# Patient Record
Sex: MALE | Race: WHITE
[De-identification: names, ages, dates, MRNs, and addresses within clinical notes are randomized per-mention and may not be internally consistent; named-entity substitution may affect disease eponyms.]

---

## 2017-01-12 ENCOUNTER — HOSPITAL ENCOUNTER (EMERGENCY)
Dept: HOSPITAL 71 - ER | Age: 77
LOS: 1 days | Discharge: HOME | End: 2017-01-13
Payer: MEDICARE

## 2017-01-12 DIAGNOSIS — Z79.82: ICD-10-CM

## 2017-01-12 DIAGNOSIS — R00.1: ICD-10-CM

## 2017-01-12 DIAGNOSIS — Z79.899: ICD-10-CM

## 2017-01-12 DIAGNOSIS — E11.65: ICD-10-CM

## 2017-01-12 DIAGNOSIS — R42: Primary | ICD-10-CM

## 2017-01-12 PROCEDURE — 96372 THER/PROPH/DIAG INJ SC/IM: CPT

## 2017-01-12 PROCEDURE — 84439 ASSAY OF FREE THYROXINE: CPT

## 2017-01-12 PROCEDURE — 80053 COMPREHEN METABOLIC PANEL: CPT

## 2017-01-12 PROCEDURE — 83735 ASSAY OF MAGNESIUM: CPT

## 2017-01-12 PROCEDURE — 36415 COLL VENOUS BLD VENIPUNCTURE: CPT

## 2017-01-12 PROCEDURE — 84484 ASSAY OF TROPONIN QUANT: CPT

## 2017-01-12 PROCEDURE — 84443 ASSAY THYROID STIM HORMONE: CPT

## 2017-01-12 PROCEDURE — 81001 URINALYSIS AUTO W/SCOPE: CPT

## 2017-01-12 PROCEDURE — 85014 HEMATOCRIT: CPT

## 2017-01-12 PROCEDURE — 80047 BASIC METABLC PNL IONIZED CA: CPT

## 2017-01-12 PROCEDURE — 85025 COMPLETE CBC W/AUTO DIFF WBC: CPT

## 2017-01-12 PROCEDURE — 82947 ASSAY GLUCOSE BLOOD QUANT: CPT

## 2017-01-12 PROCEDURE — 71020: CPT

## 2017-01-12 PROCEDURE — 93005 ELECTROCARDIOGRAM TRACING: CPT

## 2018-01-03 ENCOUNTER — OFFICE VISIT CONVERTED (OUTPATIENT)
Dept: ONCOLOGY | Facility: HOSPITAL | Age: 78
End: 2018-01-03
Attending: INTERNAL MEDICINE

## 2018-02-01 ENCOUNTER — OFFICE VISIT CONVERTED (OUTPATIENT)
Dept: ONCOLOGY | Facility: HOSPITAL | Age: 78
End: 2018-02-01
Attending: INTERNAL MEDICINE

## 2018-02-20 ENCOUNTER — OFFICE VISIT CONVERTED (OUTPATIENT)
Dept: FAMILY MEDICINE CLINIC | Facility: CLINIC | Age: 78
End: 2018-02-20
Attending: NURSE PRACTITIONER

## 2018-04-03 ENCOUNTER — OFFICE VISIT CONVERTED (OUTPATIENT)
Dept: FAMILY MEDICINE CLINIC | Facility: CLINIC | Age: 78
End: 2018-04-03
Attending: NURSE PRACTITIONER

## 2018-04-17 ENCOUNTER — CONVERSION ENCOUNTER (OUTPATIENT)
Dept: FAMILY MEDICINE CLINIC | Facility: CLINIC | Age: 78
End: 2018-04-17

## 2018-04-17 ENCOUNTER — OFFICE VISIT CONVERTED (OUTPATIENT)
Dept: FAMILY MEDICINE CLINIC | Facility: CLINIC | Age: 78
End: 2018-04-17
Attending: NURSE PRACTITIONER

## 2018-05-09 ENCOUNTER — OFFICE VISIT CONVERTED (OUTPATIENT)
Dept: CARDIOLOGY | Facility: CLINIC | Age: 78
End: 2018-05-09
Attending: INTERNAL MEDICINE

## 2018-05-21 ENCOUNTER — OFFICE VISIT CONVERTED (OUTPATIENT)
Dept: FAMILY MEDICINE CLINIC | Facility: CLINIC | Age: 78
End: 2018-05-21
Attending: NURSE PRACTITIONER

## 2018-08-08 ENCOUNTER — HOSPITAL ENCOUNTER (INPATIENT)
Facility: HOSPITAL | Age: 78
LOS: 6 days | Discharge: HOME | End: 2018-08-14
Attending: THORACIC SURGERY (CARDIOTHORACIC VASCULAR SURGERY) | Admitting: INTERNAL MEDICINE

## 2018-08-08 DIAGNOSIS — I25.110 CORONARY ARTERY DISEASE INVOLVING NATIVE CORONARY ARTERY OF NATIVE HEART WITH UNSTABLE ANGINA PECTORIS (HCC): Primary | ICD-10-CM

## 2018-08-08 LAB
BH CV XLRA MEAS - DIST GSV CALF DIST LEFT: 0.24 CM
BH CV XLRA MEAS - DIST GSV CALF DIST RIGHT: 0.2 CM
BH CV XLRA MEAS - DIST GSV THIGH DIST LEFT: 0.28 CM
BH CV XLRA MEAS - DIST GSV THIGH DIST RIGHT: 0.24 CM
BH CV XLRA MEAS - GSV ANKLE DIST LEFT: 0.25 CM
BH CV XLRA MEAS - GSV ANKLE DIST RIGHT: 0.29 CM
BH CV XLRA MEAS - GSV KNEE DIST LEFT: 0.2 CM
BH CV XLRA MEAS - GSV KNEE DIST RIGHT: 0.22 CM
BH CV XLRA MEAS - GSV ORIGIN DIST LEFT: 0.47 CM
BH CV XLRA MEAS - GSV ORIGIN DIST RIGHT: 0.35 CM
BH CV XLRA MEAS - MID GSV CALF LEFT: 0.24 CM
BH CV XLRA MEAS - MID GSV CALF RIGHT: 0.24 CM
BH CV XLRA MEAS - MID GSV THIGH  LEFT: 0.29 CM
BH CV XLRA MEAS - MID GSV THIGH  RIGHT: 0.26 CM
BH CV XLRA MEAS - PROX GSV CALF DIST LEFT: 0.14 CM
BH CV XLRA MEAS - PROX GSV CALF DIST RIGHT: 0.25 CM
BH CV XLRA MEAS - PROX GSV THIGH  LEFT: 0.32 CM
BH CV XLRA MEAS - PROX GSV THIGH  RIGHT: 0.28 CM
GLUCOSE BLDC GLUCOMTR-MCNC: 255 MG/DL (ref 70–130)
GLUCOSE BLDC GLUCOMTR-MCNC: 348 MG/DL (ref 70–130)
MAGNESIUM SERPL-MCNC: 2 MG/DL (ref 1.6–2.4)
POTASSIUM BLD-SCNC: 4.7 MMOL/L (ref 3.5–5.2)
TROPONIN T SERPL-MCNC: 0.24 NG/ML (ref 0–0.03)

## 2018-08-08 PROCEDURE — 63710000001 INSULIN ASPART PER 5 UNITS: Performed by: THORACIC SURGERY (CARDIOTHORACIC VASCULAR SURGERY)

## 2018-08-08 PROCEDURE — 83735 ASSAY OF MAGNESIUM: CPT | Performed by: THORACIC SURGERY (CARDIOTHORACIC VASCULAR SURGERY)

## 2018-08-08 PROCEDURE — 84132 ASSAY OF SERUM POTASSIUM: CPT | Performed by: THORACIC SURGERY (CARDIOTHORACIC VASCULAR SURGERY)

## 2018-08-08 PROCEDURE — 82962 GLUCOSE BLOOD TEST: CPT

## 2018-08-08 PROCEDURE — 93010 ELECTROCARDIOGRAM REPORT: CPT | Performed by: INTERNAL MEDICINE

## 2018-08-08 PROCEDURE — 84484 ASSAY OF TROPONIN QUANT: CPT | Performed by: THORACIC SURGERY (CARDIOTHORACIC VASCULAR SURGERY)

## 2018-08-08 PROCEDURE — 63710000001 INSULIN DETEMIR PER 5 UNITS: Performed by: THORACIC SURGERY (CARDIOTHORACIC VASCULAR SURGERY)

## 2018-08-08 PROCEDURE — 93005 ELECTROCARDIOGRAM TRACING: CPT | Performed by: THORACIC SURGERY (CARDIOTHORACIC VASCULAR SURGERY)

## 2018-08-08 RX ORDER — ATORVASTATIN CALCIUM 20 MG/1
20 TABLET, FILM COATED ORAL NIGHTLY
COMMUNITY
End: 2018-08-14 | Stop reason: HOSPADM

## 2018-08-08 RX ORDER — METOPROLOL SUCCINATE 25 MG/1
12.5 TABLET, EXTENDED RELEASE ORAL NIGHTLY
COMMUNITY
End: 2018-08-14 | Stop reason: HOSPADM

## 2018-08-08 RX ORDER — UREA 10 %
2500 LOTION (ML) TOPICAL DAILY
Status: DISCONTINUED | OUTPATIENT
Start: 2018-08-09 | End: 2018-08-09 | Stop reason: ALTCHOICE

## 2018-08-08 RX ORDER — MAGNESIUM SULFATE HEPTAHYDRATE 40 MG/ML
2 INJECTION, SOLUTION INTRAVENOUS AS NEEDED
Status: DISCONTINUED | OUTPATIENT
Start: 2018-08-08 | End: 2018-08-14 | Stop reason: HOSPADM

## 2018-08-08 RX ORDER — ATORVASTATIN CALCIUM 20 MG/1
20 TABLET, FILM COATED ORAL NIGHTLY
Status: DISCONTINUED | OUTPATIENT
Start: 2018-08-08 | End: 2018-08-10

## 2018-08-08 RX ORDER — INSULIN GLARGINE 100 [IU]/ML
40 INJECTION, SOLUTION SUBCUTANEOUS NIGHTLY
COMMUNITY

## 2018-08-08 RX ORDER — METOPROLOL SUCCINATE 25 MG/1
12.5 TABLET, EXTENDED RELEASE ORAL NIGHTLY
Status: DISCONTINUED | OUTPATIENT
Start: 2018-08-08 | End: 2018-08-14 | Stop reason: HOSPADM

## 2018-08-08 RX ORDER — NICOTINE POLACRILEX 4 MG
15 LOZENGE BUCCAL
Status: DISCONTINUED | OUTPATIENT
Start: 2018-08-08 | End: 2018-08-14 | Stop reason: HOSPADM

## 2018-08-08 RX ORDER — DIPHENHYDRAMINE HCL 25 MG
25 CAPSULE ORAL NIGHTLY PRN
Status: DISCONTINUED | OUTPATIENT
Start: 2018-08-08 | End: 2018-08-14 | Stop reason: HOSPADM

## 2018-08-08 RX ORDER — SPIRONOLACTONE 25 MG/1
25 TABLET ORAL EVERY OTHER DAY
Status: DISCONTINUED | OUTPATIENT
Start: 2018-08-08 | End: 2018-08-09

## 2018-08-08 RX ORDER — NITROGLYCERIN 0.4 MG/1
0.4 TABLET SUBLINGUAL
Status: DISCONTINUED | OUTPATIENT
Start: 2018-08-08 | End: 2018-08-09 | Stop reason: SDUPTHER

## 2018-08-08 RX ORDER — ASPIRIN 81 MG/1
81 TABLET ORAL DAILY
Status: DISCONTINUED | OUTPATIENT
Start: 2018-08-09 | End: 2018-08-14 | Stop reason: HOSPADM

## 2018-08-08 RX ORDER — AMLODIPINE BESYLATE AND BENAZEPRIL HYDROCHLORIDE 10; 40 MG/1; MG/1
1 CAPSULE ORAL DAILY
COMMUNITY
End: 2018-08-14 | Stop reason: HOSPADM

## 2018-08-08 RX ORDER — SODIUM CHLORIDE 0.9 % (FLUSH) 0.9 %
10 SYRINGE (ML) INJECTION AS NEEDED
Status: DISCONTINUED | OUTPATIENT
Start: 2018-08-08 | End: 2018-08-14 | Stop reason: HOSPADM

## 2018-08-08 RX ORDER — ACETAMINOPHEN 325 MG/1
650 TABLET ORAL EVERY 4 HOURS PRN
Status: DISCONTINUED | OUTPATIENT
Start: 2018-08-08 | End: 2018-08-14 | Stop reason: HOSPADM

## 2018-08-08 RX ORDER — ALPRAZOLAM 0.25 MG/1
0.25 TABLET ORAL EVERY 8 HOURS PRN
Status: DISCONTINUED | OUTPATIENT
Start: 2018-08-08 | End: 2018-08-14 | Stop reason: HOSPADM

## 2018-08-08 RX ORDER — GLIPIZIDE 10 MG/1
10 TABLET ORAL 2 TIMES DAILY
COMMUNITY

## 2018-08-08 RX ORDER — SPIRONOLACTONE 25 MG/1
25 TABLET ORAL EVERY OTHER DAY
COMMUNITY
End: 2018-08-14 | Stop reason: HOSPADM

## 2018-08-08 RX ORDER — MELATONIN
1000 DAILY
Status: DISCONTINUED | OUTPATIENT
Start: 2018-08-09 | End: 2018-08-14 | Stop reason: HOSPADM

## 2018-08-08 RX ORDER — SODIUM CHLORIDE 0.9 % (FLUSH) 0.9 %
10 SYRINGE (ML) INJECTION EVERY 12 HOURS SCHEDULED
Status: DISCONTINUED | OUTPATIENT
Start: 2018-08-09 | End: 2018-08-14 | Stop reason: HOSPADM

## 2018-08-08 RX ORDER — DEXTROSE MONOHYDRATE 25 G/50ML
25 INJECTION, SOLUTION INTRAVENOUS
Status: DISCONTINUED | OUTPATIENT
Start: 2018-08-08 | End: 2018-08-14 | Stop reason: HOSPADM

## 2018-08-08 RX ORDER — MELATONIN
1000 DAILY
COMMUNITY

## 2018-08-08 RX ORDER — POTASSIUM CHLORIDE 1.5 G/1.77G
40 POWDER, FOR SOLUTION ORAL AS NEEDED
Status: DISCONTINUED | OUTPATIENT
Start: 2018-08-08 | End: 2018-08-14 | Stop reason: HOSPADM

## 2018-08-08 RX ORDER — POTASSIUM CHLORIDE 7.45 MG/ML
10 INJECTION INTRAVENOUS
Status: DISCONTINUED | OUTPATIENT
Start: 2018-08-08 | End: 2018-08-14 | Stop reason: HOSPADM

## 2018-08-08 RX ORDER — PHENOL 1.4 %
600 AEROSOL, SPRAY (ML) MUCOUS MEMBRANE 2 TIMES DAILY WITH MEALS
COMMUNITY

## 2018-08-08 RX ORDER — SODIUM CHLORIDE 0.9 % (FLUSH) 0.9 %
20 SYRINGE (ML) INJECTION AS NEEDED
Status: DISCONTINUED | OUTPATIENT
Start: 2018-08-08 | End: 2018-08-14 | Stop reason: HOSPADM

## 2018-08-08 RX ORDER — ASPIRIN 81 MG/1
81 TABLET ORAL DAILY
COMMUNITY

## 2018-08-08 RX ORDER — TEMAZEPAM 7.5 MG/1
7.5 CAPSULE ORAL NIGHTLY PRN
Status: DISCONTINUED | OUTPATIENT
Start: 2018-08-08 | End: 2018-08-14 | Stop reason: HOSPADM

## 2018-08-08 RX ORDER — GLIPIZIDE 10 MG/1
10 TABLET ORAL 2 TIMES DAILY
Status: DISCONTINUED | OUTPATIENT
Start: 2018-08-08 | End: 2018-08-09

## 2018-08-08 RX ORDER — POTASSIUM CHLORIDE 750 MG/1
40 CAPSULE, EXTENDED RELEASE ORAL AS NEEDED
Status: DISCONTINUED | OUTPATIENT
Start: 2018-08-08 | End: 2018-08-14 | Stop reason: HOSPADM

## 2018-08-08 RX ORDER — MAGNESIUM SULFATE 1 G/100ML
1 INJECTION INTRAVENOUS AS NEEDED
Status: DISCONTINUED | OUTPATIENT
Start: 2018-08-08 | End: 2018-08-14 | Stop reason: HOSPADM

## 2018-08-08 RX ADMIN — NITROGLYCERIN 0.4 MG: 0.4 TABLET, ORALLY DISINTEGRATING SUBLINGUAL at 23:04

## 2018-08-08 RX ADMIN — METOPROLOL SUCCINATE 12.5 MG: 25 TABLET, FILM COATED, EXTENDED RELEASE ORAL at 23:49

## 2018-08-08 RX ADMIN — GLIPIZIDE 10 MG: 10 TABLET ORAL at 23:49

## 2018-08-08 RX ADMIN — ATORVASTATIN CALCIUM 20 MG: 20 TABLET, FILM COATED ORAL at 23:49

## 2018-08-08 RX ADMIN — ALPRAZOLAM 0.25 MG: 0.25 TABLET ORAL at 23:07

## 2018-08-08 RX ADMIN — INSULIN ASPART 7 UNITS: 100 INJECTION, SOLUTION INTRAVENOUS; SUBCUTANEOUS at 23:57

## 2018-08-08 RX ADMIN — NITROGLYCERIN 0.4 MG: 0.4 TABLET, ORALLY DISINTEGRATING SUBLINGUAL at 22:59

## 2018-08-08 RX ADMIN — INSULIN DETEMIR 30 UNITS: 100 INJECTION, SOLUTION SUBCUTANEOUS at 23:57

## 2018-08-09 ENCOUNTER — APPOINTMENT (OUTPATIENT)
Dept: CARDIOLOGY | Facility: HOSPITAL | Age: 78
End: 2018-08-09
Attending: THORACIC SURGERY (CARDIOTHORACIC VASCULAR SURGERY)

## 2018-08-09 ENCOUNTER — APPOINTMENT (OUTPATIENT)
Dept: GENERAL RADIOLOGY | Facility: HOSPITAL | Age: 78
End: 2018-08-09
Attending: THORACIC SURGERY (CARDIOTHORACIC VASCULAR SURGERY)

## 2018-08-09 PROBLEM — I25.110 CORONARY ARTERY DISEASE INVOLVING NATIVE CORONARY ARTERY OF NATIVE HEART WITH UNSTABLE ANGINA PECTORIS (HCC): Status: ACTIVE | Noted: 2018-08-09

## 2018-08-09 LAB
ABO GROUP BLD: NORMAL
ANION GAP SERPL CALCULATED.3IONS-SCNC: 9.9 MMOL/L
ARTERIAL PATENCY WRIST A: POSITIVE
ATMOSPHERIC PRESS: 748.5 MMHG
BACTERIA UR QL AUTO: NORMAL /HPF
BASE EXCESS BLDA CALC-SCNC: 0 MMOL/L (ref 0–2)
BDY SITE: ABNORMAL
BH CV XLRA MEAS LEFT DIST CCA EDV: -24.4 CM/SEC
BH CV XLRA MEAS LEFT DIST CCA PSV: -88.8 CM/SEC
BH CV XLRA MEAS LEFT DIST ICA EDV: -38.5 CM/SEC
BH CV XLRA MEAS LEFT DIST ICA PSV: -144 CM/SEC
BH CV XLRA MEAS LEFT ICA/CCA RATIO: 2
BH CV XLRA MEAS LEFT MID ICA EDV: 53 CM/SEC
BH CV XLRA MEAS LEFT MID ICA PSV: 164 CM/SEC
BH CV XLRA MEAS LEFT PROX CCA EDV: 16.5 CM/SEC
BH CV XLRA MEAS LEFT PROX CCA PSV: 76.2 CM/SEC
BH CV XLRA MEAS LEFT PROX ECA EDV: -9.4 CM/SEC
BH CV XLRA MEAS LEFT PROX ECA PSV: -84.4 CM/SEC
BH CV XLRA MEAS LEFT PROX ICA EDV: -54 CM/SEC
BH CV XLRA MEAS LEFT PROX ICA PSV: -185 CM/SEC
BH CV XLRA MEAS LEFT PROX SCLA PSV: 116 CM/SEC
BH CV XLRA MEAS LEFT VERTEBRAL A EDV: -13.7 CM/SEC
BH CV XLRA MEAS LEFT VERTEBRAL A PSV: -47.5 CM/SEC
BH CV XLRA MEAS RIGHT DIST CCA EDV: 17.6 CM/SEC
BH CV XLRA MEAS RIGHT DIST CCA PSV: 85 CM/SEC
BH CV XLRA MEAS RIGHT DIST ICA EDV: -41.6 CM/SEC
BH CV XLRA MEAS RIGHT DIST ICA PSV: -122 CM/SEC
BH CV XLRA MEAS RIGHT ICA/CCA RATIO: 1.7
BH CV XLRA MEAS RIGHT MID ICA EDV: -28.7 CM/SEC
BH CV XLRA MEAS RIGHT MID ICA PSV: -95 CM/SEC
BH CV XLRA MEAS RIGHT PROX CCA EDV: 11.4 CM/SEC
BH CV XLRA MEAS RIGHT PROX CCA PSV: 62.9 CM/SEC
BH CV XLRA MEAS RIGHT PROX ECA EDV: -9.4 CM/SEC
BH CV XLRA MEAS RIGHT PROX ECA PSV: -93.2 CM/SEC
BH CV XLRA MEAS RIGHT PROX ICA EDV: -45.1 CM/SEC
BH CV XLRA MEAS RIGHT PROX ICA PSV: -148 CM/SEC
BH CV XLRA MEAS RIGHT PROX SCLA PSV: 124 CM/SEC
BH CV XLRA MEAS RIGHT VERTEBRAL A EDV: 15.8 CM/SEC
BH CV XLRA MEAS RIGHT VERTEBRAL A PSV: -46.3 CM/SEC
BILIRUB UR QL STRIP: NEGATIVE
BLD GP AB SCN SERPL QL: NEGATIVE
BUN BLD-MCNC: 22 MG/DL (ref 8–23)
BUN/CREAT SERPL: 18.3 (ref 7–25)
CALCIUM SPEC-SCNC: 8.5 MG/DL (ref 8.6–10.5)
CHLORIDE SERPL-SCNC: 103 MMOL/L (ref 98–107)
CLARITY UR: CLEAR
CO2 SERPL-SCNC: 28.1 MMOL/L (ref 22–29)
COLOR UR: YELLOW
CREAT BLD-MCNC: 1.2 MG/DL (ref 0.76–1.27)
CREAT UR-MCNC: 92.3 MG/DL
DEPRECATED RDW RBC AUTO: 50.2 FL (ref 37–54)
ERYTHROCYTE [DISTWIDTH] IN BLOOD BY AUTOMATED COUNT: 15.2 % (ref 11.5–14.5)
GFR SERPL CREATININE-BSD FRML MDRD: 59 ML/MIN/1.73
GLUCOSE BLD-MCNC: 159 MG/DL (ref 65–99)
GLUCOSE BLDC GLUCOMTR-MCNC: 163 MG/DL (ref 70–130)
GLUCOSE BLDC GLUCOMTR-MCNC: 217 MG/DL (ref 70–130)
GLUCOSE BLDC GLUCOMTR-MCNC: 274 MG/DL (ref 70–130)
GLUCOSE BLDC GLUCOMTR-MCNC: 322 MG/DL (ref 70–130)
GLUCOSE UR STRIP-MCNC: ABNORMAL MG/DL
HCO3 BLDA-SCNC: 23.6 MMOL/L (ref 22–28)
HCT VFR BLD AUTO: 25.4 % (ref 40.4–52.2)
HGB BLD-MCNC: 7.8 G/DL (ref 13.7–17.6)
HGB UR QL STRIP.AUTO: ABNORMAL
HOROWITZ INDEX BLD+IHG-RTO: 21 %
HYALINE CASTS UR QL AUTO: NORMAL /LPF
KETONES UR QL STRIP: NEGATIVE
LEFT ARM BP: NORMAL MMHG
LEUKOCYTE ESTERASE UR QL STRIP.AUTO: NEGATIVE
MCH RBC QN AUTO: 27.9 PG (ref 27–32.7)
MCHC RBC AUTO-ENTMCNC: 30.7 G/DL (ref 32.6–36.4)
MCV RBC AUTO: 90.7 FL (ref 79.8–96.2)
MODALITY: ABNORMAL
NITRITE UR QL STRIP: NEGATIVE
O2 A-A PPRESDIFF RESPIRATORY: 0.7 MMHG
PCO2 BLDA: 32.8 MM HG (ref 35–45)
PH BLDA: 7.47 PH UNITS (ref 7.35–7.45)
PH UR STRIP.AUTO: 7 [PH] (ref 5–8)
PLATELET # BLD AUTO: 114 10*3/MM3 (ref 140–500)
PMV BLD AUTO: 9.1 FL (ref 6–12)
PO2 BLDA: 80.3 MM HG (ref 80–100)
POTASSIUM BLD-SCNC: 4.6 MMOL/L (ref 3.5–5.2)
PROT UR QL STRIP: ABNORMAL
PROT UR-MCNC: 282 MG/DL
RBC # BLD AUTO: 2.8 10*6/MM3 (ref 4.6–6)
RBC # UR: NORMAL /HPF
REF LAB TEST METHOD: NORMAL
RH BLD: POSITIVE
RIGHT ARM BP: NORMAL MMHG
SAO2 % BLDCOA: 96.6 % (ref 92–99)
SET MECH RESP RATE: 18
SODIUM BLD-SCNC: 141 MMOL/L (ref 136–145)
SP GR UR STRIP: 1.02 (ref 1–1.03)
SQUAMOUS #/AREA URNS HPF: NORMAL /HPF
T&S EXPIRATION DATE: NORMAL
UROBILINOGEN UR QL STRIP: ABNORMAL
WBC NRBC COR # BLD: 2.36 10*3/MM3 (ref 4.5–10.7)
WBC UR QL AUTO: NORMAL /HPF

## 2018-08-09 PROCEDURE — 82803 BLOOD GASES ANY COMBINATION: CPT

## 2018-08-09 PROCEDURE — 86900 BLOOD TYPING SEROLOGIC ABO: CPT

## 2018-08-09 PROCEDURE — 80048 BASIC METABOLIC PNL TOTAL CA: CPT | Performed by: THORACIC SURGERY (CARDIOTHORACIC VASCULAR SURGERY)

## 2018-08-09 PROCEDURE — 86901 BLOOD TYPING SEROLOGIC RH(D): CPT | Performed by: HOSPITALIST

## 2018-08-09 PROCEDURE — 63710000001 INSULIN ASPART PER 5 UNITS: Performed by: THORACIC SURGERY (CARDIOTHORACIC VASCULAR SURGERY)

## 2018-08-09 PROCEDURE — 93010 ELECTROCARDIOGRAM REPORT: CPT | Performed by: INTERNAL MEDICINE

## 2018-08-09 PROCEDURE — 86850 RBC ANTIBODY SCREEN: CPT | Performed by: HOSPITALIST

## 2018-08-09 PROCEDURE — 82570 ASSAY OF URINE CREATININE: CPT | Performed by: INTERNAL MEDICINE

## 2018-08-09 PROCEDURE — 36430 TRANSFUSION BLD/BLD COMPNT: CPT

## 2018-08-09 PROCEDURE — 93880 EXTRACRANIAL BILAT STUDY: CPT

## 2018-08-09 PROCEDURE — 99221 1ST HOSP IP/OBS SF/LOW 40: CPT | Performed by: INTERNAL MEDICINE

## 2018-08-09 PROCEDURE — 99223 1ST HOSP IP/OBS HIGH 75: CPT | Performed by: NURSE PRACTITIONER

## 2018-08-09 PROCEDURE — 81001 URINALYSIS AUTO W/SCOPE: CPT | Performed by: THORACIC SURGERY (CARDIOTHORACIC VASCULAR SURGERY)

## 2018-08-09 PROCEDURE — P9016 RBC LEUKOCYTES REDUCED: HCPCS

## 2018-08-09 PROCEDURE — 85027 COMPLETE CBC AUTOMATED: CPT | Performed by: THORACIC SURGERY (CARDIOTHORACIC VASCULAR SURGERY)

## 2018-08-09 PROCEDURE — 82962 GLUCOSE BLOOD TEST: CPT

## 2018-08-09 PROCEDURE — 94799 UNLISTED PULMONARY SVC/PX: CPT

## 2018-08-09 PROCEDURE — 86901 BLOOD TYPING SEROLOGIC RH(D): CPT

## 2018-08-09 PROCEDURE — 36600 WITHDRAWAL OF ARTERIAL BLOOD: CPT

## 2018-08-09 PROCEDURE — 63710000001 INSULIN DETEMIR PER 5 UNITS: Performed by: HOSPITALIST

## 2018-08-09 PROCEDURE — 71046 X-RAY EXAM CHEST 2 VIEWS: CPT

## 2018-08-09 PROCEDURE — 93005 ELECTROCARDIOGRAM TRACING: CPT | Performed by: THORACIC SURGERY (CARDIOTHORACIC VASCULAR SURGERY)

## 2018-08-09 PROCEDURE — 93970 EXTREMITY STUDY: CPT

## 2018-08-09 PROCEDURE — 99233 SBSQ HOSP IP/OBS HIGH 50: CPT | Performed by: INTERNAL MEDICINE

## 2018-08-09 PROCEDURE — 84156 ASSAY OF PROTEIN URINE: CPT | Performed by: INTERNAL MEDICINE

## 2018-08-09 PROCEDURE — 86923 COMPATIBILITY TEST ELECTRIC: CPT

## 2018-08-09 PROCEDURE — 86900 BLOOD TYPING SEROLOGIC ABO: CPT | Performed by: HOSPITALIST

## 2018-08-09 RX ORDER — CALCIUM CARBONATE 200(500)MG
5 TABLET,CHEWABLE ORAL DAILY
Status: DISCONTINUED | OUTPATIENT
Start: 2018-08-09 | End: 2018-08-14 | Stop reason: HOSPADM

## 2018-08-09 RX ORDER — SODIUM CHLORIDE 0.9 % (FLUSH) 0.9 %
1-10 SYRINGE (ML) INJECTION AS NEEDED
Status: DISCONTINUED | OUTPATIENT
Start: 2018-08-09 | End: 2018-08-10

## 2018-08-09 RX ORDER — LISINOPRIL 40 MG/1
40 TABLET ORAL
Status: DISCONTINUED | OUTPATIENT
Start: 2018-08-09 | End: 2018-08-12

## 2018-08-09 RX ORDER — PANTOPRAZOLE SODIUM 40 MG/1
40 TABLET, DELAYED RELEASE ORAL
Status: DISCONTINUED | OUTPATIENT
Start: 2018-08-10 | End: 2018-08-14 | Stop reason: HOSPADM

## 2018-08-09 RX ORDER — ACETAMINOPHEN 325 MG/1
650 TABLET ORAL EVERY 4 HOURS PRN
Status: DISCONTINUED | OUTPATIENT
Start: 2018-08-09 | End: 2018-08-09 | Stop reason: SDUPTHER

## 2018-08-09 RX ORDER — NITROGLYCERIN 0.4 MG/1
0.4 TABLET SUBLINGUAL
Status: DISCONTINUED | OUTPATIENT
Start: 2018-08-09 | End: 2018-08-10

## 2018-08-09 RX ADMIN — INSULIN ASPART 6 UNITS: 100 INJECTION, SOLUTION INTRAVENOUS; SUBCUTANEOUS at 20:47

## 2018-08-09 RX ADMIN — ATORVASTATIN CALCIUM 20 MG: 20 TABLET, FILM COATED ORAL at 20:12

## 2018-08-09 RX ADMIN — METOPROLOL SUCCINATE 12.5 MG: 25 TABLET, FILM COATED, EXTENDED RELEASE ORAL at 20:12

## 2018-08-09 RX ADMIN — Medication 10 ML: at 20:12

## 2018-08-09 RX ADMIN — Medication 5 TABLET: at 18:24

## 2018-08-09 RX ADMIN — GLIPIZIDE 10 MG: 10 TABLET ORAL at 08:43

## 2018-08-09 RX ADMIN — ASPIRIN 81 MG: 81 TABLET, DELAYED RELEASE ORAL at 08:43

## 2018-08-09 RX ADMIN — NITROGLYCERIN 1 INCH: 20 OINTMENT TOPICAL at 15:04

## 2018-08-09 RX ADMIN — VITAMIN D, TAB 1000IU (100/BT) 1000 UNITS: 25 TAB at 08:42

## 2018-08-09 RX ADMIN — LISINOPRIL 40 MG: 40 TABLET ORAL at 18:25

## 2018-08-09 RX ADMIN — INSULIN ASPART 2 UNITS: 100 INJECTION, SOLUTION INTRAVENOUS; SUBCUTANEOUS at 07:01

## 2018-08-09 RX ADMIN — INSULIN ASPART 4 UNITS: 100 INJECTION, SOLUTION INTRAVENOUS; SUBCUTANEOUS at 18:25

## 2018-08-09 RX ADMIN — INSULIN DETEMIR 40 UNITS: 100 INJECTION, SOLUTION SUBCUTANEOUS at 20:46

## 2018-08-09 RX ADMIN — Medication 10 ML: at 08:45

## 2018-08-09 RX ADMIN — AMLODIPINE BESYLATE: 10 TABLET ORAL at 08:44

## 2018-08-09 NOTE — CONSULTS
"Internal medicine consult    Referring physician  Dr. Simpson    Reason for consult  Follow medical problems    History of present illness  78-year-old white male with history of prostate cancer status post chemotherapy other medical problem diabetes mellitus hypertension peptic ulcer disease admitted to [facility] with unstable angina and severe bradycardia and underwent cardiac catheterization which showed severe coronary artery disease transferred to [facility], operated by [facility] for further management.  I'm asked to follow the patient for medical problems.  At the time of interview his denies any chest pain shortness of breath but is still very weak also denies any fever chills cough nausea vomiting.  Patient has no palpitation at the time of interview.    Medical History        Past Medical History:   Diagnosis Date   • Cancer (CMS/HCC)       prostate   • Diabetes (CMS/[facility])     • H/O angioplasty     • Peptic ulcer           Surgical History         Past Surgical History:   Procedure Laterality Date   • AMPUTATION FINGER / THUMB       • APPENDECTOMY                   Family History   Problem Relation Age of Onset   • Heart disease Mother     • Heart disease Sister              Social History   Substance Use Topics   • Smoking status: Former Smoker       Packs/day: 1.00       Types: Cigarettes       Start date: 1/1/1963       Quit date: 1/1/1975   • Smokeless tobacco: Never Used   • Alcohol use No      Allergies:  Patient has no known allergies.     Home medications reviewed     Physical Exam    Blood pressure 147/87, pulse 84, temperature 98.1 °F (36.7 °C), temperature source Oral, resp. rate 18, height 182.9 cm (72\"), weight 102 kg (225 lb), SpO2 96 %.    Constitutional: He is oriented to person, place, and time. He appears well-developed and well-nourished. He is cooperative.   Eyes: No scleral icterus.   Neck: Neck supple. Normal carotid pulses and no JVD present. Carotid bruit is not present. "   Cardiovascular: Normal rate, regular rhythm, normal heart sounds and intact distal pulses.  Exam reveals no gallop and no friction rub.  No murmur heard.  Pulmonary/Chest: Effort normal and breath sounds normal. He has no wheezes. He has no rales. He exhibits no tenderness.   Abdominal: Soft. Bowel sounds are normal. He exhibits no distension and no mass. There is no hepatosplenomegaly. There is no tenderness. There is no guarding and no CVA tenderness.   Musculoskeletal: He exhibits edema (trace ankle). He exhibits no tenderness or deformity.   Neurological: He is alert and oriented to person, place, and time.   Skin: Skin is warm and dry. Capillary refill takes less than 2 seconds. No rash noted. No erythema.   Psychiatric: He has a normal mood and affect. His behavior is normal. Judgment and thought content normal.      Results Review:   Lab Results (last 24 hours)     Procedure Component Value Units Date/Time    POC Glucose Once [[id number]]  (Abnormal) Collected:  08/09/18 1024    Specimen:  Blood Updated:  08/09/18 1026     Glucose 322 (H) mg/dL     Narrative:       Meter: [id number] : [id number] Porfirio RHODES    Blood Gas, Arterial [[id number]]  (Abnormal) Collected:  08/09/18 1016    Specimen:  Arterial Blood Updated:  08/09/18 1018     Site Arterial: left radial     Walt's Test Positive     pH, Arterial 7.466 (H) pH units      pCO2, Arterial 32.8 (L) mm Hg      pO2, Arterial 80.3 mm Hg      HCO3, Arterial 23.6 mmol/L      Base Excess, Arterial 0.0 mmol/L      O2 Saturation Calculated 96.6 %      A-a Gradiant 0.7 mmHg      Barometric Pressure for Blood Gas 748.5 mmHg      Modality Room Air     FIO2 21 %      Set [facility] Resp Rate 18    Narrative:       Meter: [id number] : [id number] Genaro Patterson    Urinalysis With Culture If Indicated - Urine, Clean Catch [[id number]]  (Abnormal) Collected:  08/09/18 0948    Specimen:  Urine from Urine, Clean Catch Updated:  08/09/18 1015     Color, UA Yellow      Appearance, UA Clear     pH, UA 7.0     Specific Gravity, UA 1.024     Glucose,  mg/dL (2+) (A)     Ketones, UA Negative     Bilirubin, UA Negative     Blood, UA Trace (A)     Protein, UA >=300 mg/dL (3+) (A)     Leuk Esterase, UA Negative     Nitrite, UA Negative     Urobilinogen, UA 1.0 E.U./dL    Urinalysis, Microscopic Only - Urine, Clean Catch [[id number]] Collected:  08/09/18 0948    Specimen:  Urine from Urine, Clean Catch Updated:  08/09/18 1015     RBC, UA 0-2 /HPF      WBC, UA 0-2 /HPF      Bacteria, UA None Seen /HPF      Squamous Epithelial Cells, UA 0-2 /HPF      Hyaline Casts, UA 0-2 /LPF      Methodology Automated Microscopy    Basic Metabolic Panel [[id number]]  (Abnormal) Collected:  08/09/18 0545    Specimen:  Blood Updated:  08/09/18 0631     Glucose 159 (H) mg/dL      BUN 22 mg/dL      Creatinine 1.20 mg/dL      Sodium 141 mmol/L      Potassium 4.6 mmol/L      Chloride 103 mmol/L      CO2 28.1 mmol/L      Calcium 8.5 (L) mg/dL      eGFR Non African Amer 59 (L) mL/min/1.73      BUN/Creatinine Ratio 18.3     Anion Gap 9.9 mmol/L     Narrative:       The MDRD GFR formula is only valid for adults with stable renal function between ages 18 and 70.    POC Glucose Once [[id number]]  (Abnormal) Collected:  08/09/18 0619    Specimen:  Blood Updated:  08/09/18 0621     Glucose 163 (H) mg/dL     Narrative:       Meter: [id number] : [id number] Katelin Prado RN    CBC (No Diff) [[id number]]  (Abnormal) Collected:  08/09/18 0545    Specimen:  Blood Updated:  08/09/18 0610     WBC 2.36 (L) 10*3/mm3      RBC 2.80 (L) 10*6/mm3      Hemoglobin 7.8 (L) g/dL      Hematocrit 25.4 (L) %      MCV 90.7 fL      MCH 27.9 pg      MCHC 30.7 (L) g/dL      RDW 15.2 (H) %      RDW-SD 50.2 fl      MPV 9.1 fL      Platelets 114 (L) 10*3/mm3     POC Glucose Once [[id number]]  (Abnormal) Collected:  08/08/18 2350    Specimen:  Blood Updated:  08/08/18 2351     Glucose 348 (H) mg/dL     Narrative:       Meter: [id number]  : [id number] Tamara Gibbs CNA    Troponin [[id number]]  (Abnormal) Collected:  08/08/18 2303    Specimen:  Blood Updated:  08/08/18 2339     Troponin T 0.245 (C) ng/mL     Narrative:       Troponin T Reference Ranges:  Less than 0.03 ng/mL:    Negative for AMI  0.03 to 0.09 ng/mL:      Indeterminant for AMI  Greater than 0.09 ng/mL: Positive for AMI    Potassium [[id number]]  (Normal) Collected:  08/08/18 2303    Specimen:  Blood Updated:  08/08/18 2332     Potassium 4.7 mmol/L     Magnesium [[id number]]  (Normal) Collected:  08/08/18 2303    Specimen:  Blood Updated:  08/08/18 2331     Magnesium 2.0 mg/dL     POC Glucose Once [[id number]]  (Abnormal) Collected:  08/08/18 2109    Specimen:  Blood Updated:  08/08/18 2111     Glucose 255 (H) mg/dL     Narrative:       Meter: [id number] : [id number] Tamara Gibbs CNA        Imaging Results (last 24 hours)     Procedure Component Value Units Date/Time    XR Chest PA & Lateral [[id number]] Collected:  08/09/18 1304     Updated:  08/09/18 1311    Narrative:       TWO-VIEW CHEST     HISTORY: Chest pain. Prostate cancer.     FINDINGS: There are no prior exams. The lungs are well-expanded and  clear and the heart size is normal. There is a densely calcified right  paratracheal lymph node. There is generalized sclerosis involving the  entire thoracic spine and scattered ribs and the appearance is highly  suspicious for extensive metastatic bone disease related to prostate  cancer.     This report was finalized on 8/9/2018 1:08 PM by Dr. Cheo Lopez M.D.              ECG 12 Lead       RR Interval= 759 ms  AR Interval= 192 ms  QRSD Interval= 166 ms  QT Interval= 428 ms  QTc Interval= 491 ms  Heart Rate= 79 ms  P Axis= 75 deg  QRS Axis= 23 deg  T Wave Axis= 195 deg  I: 40 Axis= 47 deg  T: 40 Axis= -62 deg  ST Axis= 208 deg  SINUS RHYTHM  LEFT BUNDLE BRANCH BLOCK                Current Facility-Administered Medications:   •  acetaminophen (TYLENOL) tablet 650 mg, 650 mg,  Oral, Q4H PRN, Albert Ruiz MD  •  ALPRAZolam (XANAX) tablet 0.25 mg, 0.25 mg, Oral, Q8H PRN, Albert Ruiz MD, 0.25 mg at 08/08/18 2307  •  amLODIPine (NORVASC) 10 mg, lisinopril (PRINIVIL,ZESTRIL) 40 mg, , Oral, Q24H, Albert Ruiz MD  •  aspirin EC tablet 81 mg, 81 mg, Oral, Daily, Albert Ruiz MD, 81 mg at 08/09/18 0843  •  atorvastatin (LIPITOR) tablet 20 mg, 20 mg, Oral, Nightly, Albert Ruiz MD, 20 mg at 08/08/18 2349  •  calcium carbonate (TUMS) chewable tablet 500 mg (200 mg elemental), 5 tablet, Oral, Daily, Albert Ruiz MD  •  cholecalciferol (VITAMIN D3) tablet 1,000 Units, 1,000 Units, Oral, Daily, Albert Ruiz MD, 1,000 Units at 08/09/18 0842  •  dextrose (D50W) solution 25 g, 25 g, Intravenous, Q15 Min PRN, Albert Ruiz MD  •  dextrose (GLUTOSE) oral gel 15 g, 15 g, Oral, Q15 Min PRN, Albert Ruiz MD  •  diphenhydrAMINE (BENADRYL) capsule 25 mg, 25 mg, Oral, Nightly PRN, Albert Ruiz MD  •  glucagon (human recombinant) (GLUCAGEN DIAGNOSTIC) injection 1 mg, 1 mg, Subcutaneous, PRN, Albert Ruiz MD  •  heparin flush (porcine) 100 UNIT/ML injection 500 Units, 5 mL, Intravenous, PRN, Albert Ruiz MD  •  insulin aspart (novoLOG) injection 0-9 Units, 0-9 Units, Subcutaneous, 4x Daily With Meals & Nightly, Albert Ruiz MD, 2 Units at 08/09/18 0701  •  insulin detemir (LEVEMIR) injection 30 Units, 30 Units, Subcutaneous, Nightly, Albert Ruiz MD, 30 Units at 08/08/18 2737  •  Magnesium Sulfate 2 gram infusion - Mg less than or equal to 1.5 mg/dL, 2 g, Intravenous, PRN **OR** Magesium Sulfate 1 gram infusion - Mg 1.6-1.9 mg/dL, 1 g, Intravenous, PRN, Albert Ruiz MD  •  metoprolol succinate XL (TOPROL-XL) 24 hr tablet 12.5 mg, 12.5 mg, Oral, Nightly, Albert Ruiz MD, 12.5 mg at 08/08/18 5326  •  nitroglycerin (NITROSTAT) ointment 1 inch, 1 inch, Topical, Q6H, Rafaela Marin APRN, 1 inch at 08/09/18  1504  •  nitroglycerin (NITROSTAT) SL tablet 0.4 mg, 0.4 mg, Sublingual, Q5 Min PRN, Albert Ruiz MD  •  [START ON 8/10/2018] pantoprazole (PROTONIX) EC tablet 40 mg, 40 mg, Oral, Q AM, Nanda Kumari MD  •  potassium chloride (MICRO-K) CR capsule 40 mEq, 40 mEq, Oral, PRN **OR** potassium chloride (KLOR-CON) packet 40 mEq, 40 mEq, Oral, PRN **OR** potassium chloride 10 mEq in 100 mL IVPB, 10 mEq, Intravenous, Q1H PRN, Albert Ruiz MD  •  sodium chloride 0.9 % flush 1-10 mL, 1-10 mL, Intravenous, PRN, Albert Ruiz MD  •  sodium chloride 0.9 % flush 10 mL, 10 mL, Intravenous, Q12H, Albert Ruiz MD, 10 mL at 08/09/18 0845  •  sodium chloride 0.9 % flush 10 mL, 10 mL, Intravenous, PRN, Albert Ruiz MD  •  sodium chloride 0.9 % flush 20 mL, 20 mL, Intravenous, PRN, Albert Ruiz MD  •  temazepam (RESTORIL) capsule 7.5 mg, 7.5 mg, Oral, Nightly PRN, Albert Ruiz MD     ASSESSMENT  Coronary artery disease  Status post non-ST elevation MI  Chronic anemia and thrombocytopenia  Pancytopenia  Diabetes mellitus  Hypertension  Hyperlipidemia  Chronic kidney disease stage III  Metastatic prostate cancer    PLAN  Agree with current care  Transfuse 2 units packed RBC  Accu-Chek with sliding scale insulin  Start stress ulcer DVT prophylaxis  Continue home medication and adjust the doses  Supportive care\  Check for Hemoccult  Repeat labs in the morning  Check hemoglobin A1c TSH lipid profile  Will follow with Dr. Simpson further recommendation according to hospital course    NANDA KUMARI MD

## 2018-08-09 NOTE — H&P
"  Patient Care Team:  System, Provider Not In as PCP - General  Carlos Ulrich MD (Cardiology)    Chief complaint:  Chest pain    Subjective     History of Present Illness:  Mr. Milian is a 78-year-old male transferred from [facility] status post non-STEMI and unstable angina for surgical evaluation.  The patient states that on Saturday night he began having issues with low heart rate presented to the emergency department and was subsequently sent home on Sunday with instructions to discontinue taking his sotalol.  He began having issues with chest pain described as a constant \"pressure\" sensation that radiated to the left arm on Tuesday, he states that it was not associated with activity, and it occurred while at rest..  He again presented to the emergency department where it was relieved with aspirin and nitroglycerin.  He denies other associated symptoms such as shortness of breath, palpitations, dizziness, syncope, nausea/vomiting, or fatigue.  Of note, the patient states that he has been having shortness of breath and weakness for approximately 2 years with activity that is relieved with rest, but he has not had any chest pain prior to Tuesday's presentation to the emergency department.    Primary medical history:  Hypertension, diabetes mellitus type 2, CAD status post PCI 2012, hyperlipidemia, prostate cancer status post chemotherapy approximately 2 years ago (patient states he has metastasized to the bone), paroxysmal atrial fibrillation, left bundle branch block    Surgical history:  Right port placement, appendectomy, PCI    Social history:  Quit smoking 45 years ago after a 15-pack-year history, denies EtOH, denies illicits, denies herbal use.  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  2 adult children that are healthy    Review of Systems   Constitutional: Positive for " activity change. Negative for diaphoresis, fatigue and fever.   HENT: Negative for dental problem and mouth sores.    Eyes: Negative for visual disturbance.   Respiratory: Positive for shortness of breath. Negative for apnea, cough, chest tightness and wheezing.    Cardiovascular: Positive for chest pain and leg swelling (dependent, resolves overnight). Negative for palpitations.   Gastrointestinal: Negative for abdominal pain, blood in stool, diarrhea, nausea and vomiting.   Endocrine: Negative.    Genitourinary: Positive for difficulty urinating and frequency. Negative for dysuria, flank pain, hematuria and urgency.   Musculoskeletal: Negative for arthralgias, back pain, gait problem and myalgias.   Skin: Negative for pallor, rash and wound.   Allergic/Immunologic: Positive for immunocompromised state. Negative for environmental allergies and food allergies.   Neurological: Positive for weakness. Negative for dizziness, seizures, syncope, light-headedness and numbness.   Hematological: Negative for adenopathy. Does not bruise/bleed easily.   Psychiatric/Behavioral: Negative.         Past Medical History:   Diagnosis Date   • Cancer (CMS/[facility])     prostate   • Diabetes (CMS/[facility])    • H/O angioplasty    • Peptic ulcer      Past Surgical History:   Procedure Laterality Date   • AMPUTATION FINGER / THUMB     • APPENDECTOMY       Family History   Problem Relation Age of Onset   • Heart disease Mother    • Heart disease Sister      Social History   Substance Use Topics   • Smoking status: Former Smoker     Packs/day: 1.00     Types: Cigarettes     Start date: 1/1/1963     Quit date: 1/1/1975   • Smokeless tobacco: Never Used   • Alcohol use No     Prescriptions Prior to Admission   Medication Sig Dispense Refill Last Dose   • amLODIPine-benazepril (LOTREL) 10-40 MG per capsule Take 1 capsule by mouth Daily.   8/7/2018 at Unknown time   • aspirin 81 MG EC tablet Take 81 mg by mouth Daily.   8/8/2018 at Unknown time   •  "atorvastatin (LIPITOR) 20 MG tablet Take 20 mg by mouth Every Night.   8/7/2018 at Unknown time   • calcium carbonate (OS-BLAISE) 600 MG tablet Take 600 mg by mouth 2 (Two) Times a Day With Meals.   8/7/2018 at Unknown time   • cholecalciferol (VITAMIN D3) 1000 units tablet Take 1,000 Units by mouth Daily.   8/7/2018 at Unknown time   • glipiZIDE (GLUCOTROL) 10 MG tablet Take 10 mg by mouth 2 (Two) Times a Day.   8/8/2018 at Unknown time   • insulin glargine (LANTUS) 100 UNIT/ML injection Inject 40 Units under the skin into the appropriate area as directed Every Night.   8/7/2018 at Unknown time   • metoprolol succinate XL (TOPROL-XL) 25 MG 24 hr tablet Take 12.5 mg by mouth Every Night.   8/8/2018 at Unknown time   • spironolactone (ALDACTONE) 25 MG tablet Take 25 mg by mouth Every Other Day.   8/8/2018 at Unknown time       amLODIPine-lisinopril 10-40 mg combo dose  Oral Q24H   aspirin 81 mg Oral Daily   atorvastatin 20 mg Oral Nightly   calcium carbonate 5 tablet Oral Daily   cholecalciferol 1,000 Units Oral Daily   glipiZIDE 10 mg Oral BID   insulin aspart 0-9 Units Subcutaneous 4x Daily With Meals & Nightly   insulin detemir 30 Units Subcutaneous Nightly   metoprolol succinate XL 12.5 mg Oral Nightly   sodium chloride 10 mL Intravenous Q12H   spironolactone 25 mg Oral Every Other Day     Allergies:  Patient has no known allergies.    Objective      Vital Signs  Temp:  [98.5 °F (36.9 °C)-99.2 °F (37.3 °C)] 99.2 °F (37.3 °C)  Heart Rate:  [73-91] 77  Resp:  [18-20] 20  BP: (120-168)/(68-78) 159/72    Flowsheet Rows      First Filed Value   Admission Height  182.9 cm (72\") Documented at 08/08/2018 2013   Admission Weight  102 kg (225 lb) Documented at 08/09/2018 0631        182.9 cm (72\")    Physical Exam   Constitutional: He is oriented to person, place, and time. He appears well-developed and well-nourished. He is cooperative.   Eyes: No scleral icterus.   Neck: Neck supple. Normal carotid pulses and no JVD " present. Carotid bruit is not present.   Cardiovascular: Normal rate, regular rhythm, normal heart sounds and intact distal pulses.  Exam reveals no gallop and no friction rub.    No murmur heard.  Pulses:       Carotid pulses are 2+ on the right side, and 2+ on the left side.       Radial pulses are 2+ on the right side, and 2+ on the left side.        Dorsalis pedis pulses are 2+ on the right side, and 2+ on the left side.        Posterior tibial pulses are 2+ on the right side, and 2+ on the left side.   Pulmonary/Chest: Effort normal and breath sounds normal. He has no wheezes. He has no rales. He exhibits no tenderness.   Abdominal: Soft. Bowel sounds are normal. He exhibits no distension and no mass. There is no hepatosplenomegaly. There is no tenderness. There is no guarding and no CVA tenderness.   Musculoskeletal: He exhibits edema (trace ankle). He exhibits no tenderness or deformity.   Neurological: He is alert and oriented to person, place, and time.   Skin: Skin is warm and dry. Capillary refill takes less than 2 seconds. No rash noted. No erythema.   Psychiatric: He has a normal mood and affect. His behavior is normal. Judgment and thought content normal.       Results Review:   Lab Results (last 24 hours)     Procedure Component Value Units Date/Time    POC Glucose Once [[id number]]  (Abnormal) Collected:  08/09/18 1024    Specimen:  Blood Updated:  08/09/18 1026     Glucose 322 (H) mg/dL     Narrative:       Meter: [id number] : [id number] Porfirio Christin NA    Blood Gas, Arterial [[id number]]  (Abnormal) Collected:  08/09/18 1016    Specimen:  Arterial Blood Updated:  08/09/18 1018     Site Arterial: left radial     Walt's Test Positive     pH, Arterial 7.466 (H) pH units      pCO2, Arterial 32.8 (L) mm Hg      pO2, Arterial 80.3 mm Hg      HCO3, Arterial 23.6 mmol/L      Base Excess, Arterial 0.0 mmol/L      O2 Saturation Calculated 96.6 %      A-a Gradiant 0.7 mmHg      Barometric Pressure for  Blood Gas 748.5 mmHg      Modality Room Air     FIO2 21 %      [facility]    Narrative:       Meter: [id number] : [id number] Genaro Patterson    Urinalysis With Culture If Indicated - Urine, Clean Catch [[id number]]  (Abnormal) Collected:  08/09/18 0948    Specimen:  Urine from Urine, Clean Catch Updated:  08/09/18 1015     Color, UA Yellow     Appearance, UA Clear     pH, UA 7.0     Specific Gravity, UA 1.024     Glucose,  mg/dL (2+) (A)     Ketones, UA Negative     Bilirubin, UA Negative     Blood, UA Trace (A)     Protein, UA >=300 mg/dL (3+) (A)     Leuk Esterase, UA Negative     Nitrite, UA Negative     Urobilinogen, UA 1.0 E.U./dL    Urinalysis, Microscopic Only - Urine, Clean Catch [[id number]] Collected:  08/09/18 0948    Specimen:  Urine from Urine, Clean Catch Updated:  08/09/18 1015     RBC, UA 0-2 /HPF      WBC, UA 0-2 /HPF      Bacteria, UA None Seen /HPF      Squamous Epithelial Cells, UA 0-2 /HPF      Hyaline Casts, UA 0-2 /LPF      Methodology Automated Microscopy    Basic Metabolic Panel [[id number]]  (Abnormal) Collected:  08/09/18 0545    Specimen:  Blood Updated:  08/09/18 0631     Glucose 159 (H) mg/dL      BUN 22 mg/dL      Creatinine 1.20 mg/dL      Sodium 141 mmol/L      Potassium 4.6 mmol/L      Chloride 103 mmol/L      CO2 28.1 mmol/L      Calcium 8.5 (L) mg/dL      eGFR Non African Amer 59 (L) mL/min/1.73      BUN/Creatinine Ratio 18.3     Anion Gap 9.9 mmol/L     Narrative:       The MDRD GFR formula is only valid for adults with stable renal function between ages 18 and 70.    POC Glucose Once [[id number]]  (Abnormal) Collected:  08/09/18 0619    Specimen:  Blood Updated:  08/09/18 0621     Glucose 163 (H) mg/dL     Narrative:       Meter: [id number] : [id number] Katelin Prado RN    CBC (No Diff) [[id number]]  (Abnormal) Collected:  08/09/18 0545    Specimen:  Blood Updated:  08/09/18 0610     WBC 2.36 (L) 10*3/mm3      RBC 2.80 (L) 10*6/mm3      Hemoglobin 7.8 (L)  g/dL      Hematocrit 25.4 (L) %      MCV 90.7 fL      MCH 27.9 pg      MCHC 30.7 (L) g/dL      RDW 15.2 (H) %      RDW-SD 50.2 fl      MPV 9.1 fL      Platelets 114 (L) 10*3/mm3     POC Glucose Once [[id number]]  (Abnormal) Collected:  08/08/18 2350    Specimen:  Blood Updated:  08/08/18 2351     Glucose 348 (H) mg/dL     Narrative:       Meter: [id number] : [id number] Gibbs CNA    Troponin [[id number]]  (Abnormal) Collected:  08/08/18 2303    Specimen:  Blood Updated:  08/08/18 2339     Troponin T 0.245 (C) ng/mL     Narrative:       Troponin T Reference Ranges:  Less than 0.03 ng/mL:    Negative for AMI  0.03 to 0.09 ng/mL:      Indeterminant for AMI  Greater than 0.09 ng/mL: Positive for AMI    Potassium [[id number]]  (Normal) Collected:  08/08/18 2303    Specimen:  Blood Updated:  08/08/18 2332     Potassium 4.7 mmol/L     Magnesium [[id number]]  (Normal) Collected:  08/08/18 2303    Specimen:  Blood Updated:  08/08/18 2331     Magnesium 2.0 mg/dL     POC Glucose Once [[id number]]  (Abnormal) Collected:  08/08/18 2109    Specimen:  Blood Updated:  08/08/18 2111     Glucose 255 (H) mg/dL     Narrative:       Meter: [id number] : [id number] Gibbs CNA              Assessment/Plan     Active Problems:    * No active hospital problems. *      Assessment & Plan    NSTEMI----troponin 0.24  Unstable angina, CAD, hx PCI 2012  Anemia, thrombocytopenia -----Hb 7.8, platelet count 114  Prostate cancer with bone metastases s/p chemotherapy---on chronic immunosuppression  HTN---mild elevation  HL  PAF, LBBB----sotalol discontinued r/t bradycardia  DM II----on oral and insulin, internal medicine consulted  JACOB at [facility] (peak creatinine 1.5)----1.2 today    STS calculation (without CAD anatomy and valvular fxn):  Procedure: CAB Only  Risk of Mortality: 2.723%   Morbidity or Mortality: 16.558%   Long Length of Stay: 9.126%   Short Length of Stay: 27.878%   Permanent Stroke: 0.789%   Prolonged Ventilation:  8.063%   DSW Infection: 0.551%   Renal Failure: 7.071%   Reoperation: 7.581%     Repeat troponin x2, start nitropaste, obtain hematology records from [facility] and consult hematology.  Dr. Ruiz to review films and give recommendations for potential intervention.    ANNIKA Gonzales  08/09/18  11:17 AM

## 2018-08-09 NOTE — PLAN OF CARE
Problem: Patient Care Overview  Goal: Plan of Care Review  Outcome: Ongoing (interventions implemented as appropriate)   08/09/18 0347   Coping/Psychosocial   Plan of Care Reviewed With patient   Plan of Care Review   Progress no change   OTHER   Outcome Summary VSS. Pt. direct admit from [facility]. Plan for [facility] Friday AM with [facility]-awaiting to be seen, preop testing not started. Pt. c/o of chest pain during night-2SL NTG given, O2 applied. Wife and daughter is at bedside. Will continue to monitor.      Goal: Individualization and Mutuality  Outcome: Ongoing (interventions implemented as appropriate)   08/09/18 0347   Individualization   Patient Specific Preferences Patient prefers door closed at night.        Problem: Cardiac: ACS (Acute Coronary Syndrome) (Adult)  Goal: Signs and Symptoms of Listed Potential Problems Will be Absent, Minimized or Managed (Cardiac: ACS)  Outcome: Ongoing (interventions implemented as appropriate)   08/09/18 0347   Goal/Outcome Evaluation   Problems Assessed (Acute Coronary Syndrome) all   Problems Present (Acute Coronary Syn) chest pain (angina)       Problem: Cardiac Catheterization (Diagnostic/Interventional) (Adult)  Goal: Signs and Symptoms of Listed Potential Problems Will be Absent, Minimized or Managed (Cardiac Catheterization)  Outcome: Ongoing (interventions implemented as appropriate)   08/09/18 0347   Goal/Outcome Evaluation   Problems Assessed (Cardiac Catheterization) all   Problems Present (Cardiac Cath) none     Goal: Anesthesia/Sedation Recovery  Outcome: Ongoing (interventions implemented as appropriate)   08/09/18 0347   Goal/Outcome Evaluation   Anesthesia/Sedation Recovery progressing toward baseline

## 2018-08-09 NOTE — CONSULTS
Date of Hospital Visit: 18  Encounter Provider: Rahul Simpson MD  Place of Service: [facility] CARDIOLOGY  Patient Name: Ruben Milian  :1940  [id number]  Referral Provider: Albert Ruiz MD    Chief complaint: Chest Pain    History of Present Illness:  This is a 78 year old male, former smoker of 15 years- with history of HTN, DM, CAD s/p stenting in , HLD, PAF, LBBB, and prostate cancer s/p chemotherapy. He originally went to [facility] for unstable angina where he was a NSTEMI. He underwent a cardiac catheterization finding multivessel disease, he was transferred to [facility] for bypass surgery evaluation.    Last Saturday he went to the ER for low heart rate, where he was told to stop taking his sotalol and he was discharged. Then on Tuesday, he started to have constant chest pressure that radiated into his left arm, which also occurred at rest. He went to the ER again, where he was given ASA and nitroglycerin and felt relief.     However, due to his prostate cancer with bone metastases, he is not a surgery candidate.     We have been asked to evaluate for possible intervention.    XR chest 2 vw on 18-  FINDINGS: There are no prior exams. The lungs are well-expanded and  clear and the heart size is normal. There is a densely calcified right  paratracheal lymph node. There is generalized sclerosis involving the  entire thoracic spine and scattered ribs and the appearance is highly  suspicious for extensive metastatic bone disease related to prostate  cancer.    This is a gentleman who has had A-fib, had been controlled with sotalol, came in with heart block on Friday at [facility].  Had to have a temporary pacemaker placed.  Stopped his sotalol and he has a baseline left bundle branch block and then he was observed and sent home, and guess what, came back in with A-fib with RVR, unstable angina, and a type 2 non-ST elevation MI.  They  catheterized him.  He has coronary disease.  They sent him up here for surgery, but he is not really felt to be a surgical candidate.  Prior to his rhythm problems, he really was not having chest pain, but he has had a lot of shortness of breath.  He has a lot of edema in his legs.  He has had diabetes for a long period of time.  He does not have PND or orthopnea.  He has not had syncope.            Past Medical History:   Diagnosis Date   • Cancer (CMS/HCC)     prostate   • Diabetes (CMS/HCC)    • H/O angioplasty    • Peptic ulcer        Past Surgical History:   Procedure Laterality Date   • AMPUTATION FINGER / THUMB     • APPENDECTOMY         Prescriptions Prior to Admission   Medication Sig Dispense Refill Last Dose   • amLODIPine-benazepril (LOTREL) 10-40 MG per capsule Take 1 capsule by mouth Daily.   8/7/2018 at Unknown time   • aspirin 81 MG EC tablet Take 81 mg by mouth Daily.   8/8/2018 at Unknown time   • atorvastatin (LIPITOR) 20 MG tablet Take 20 mg by mouth Every Night.   8/7/2018 at Unknown time   • calcium carbonate (OS-BLAISE) 600 MG tablet Take 600 mg by mouth 2 (Two) Times a Day With Meals.   8/7/2018 at Unknown time   • cholecalciferol (VITAMIN D3) 1000 units tablet Take 1,000 Units by mouth Daily.   8/7/2018 at Unknown time   • glipiZIDE (GLUCOTROL) 10 MG tablet Take 10 mg by mouth 2 (Two) Times a Day.   8/8/2018 at Unknown time   • insulin glargine (LANTUS) 100 UNIT/ML injection Inject 40 Units under the skin into the appropriate area as directed Every Night.   8/7/2018 at Unknown time   • metoprolol succinate XL (TOPROL-XL) 25 MG 24 hr tablet Take 12.5 mg by mouth Every Night.   8/8/2018 at Unknown time   • spironolactone (ALDACTONE) 25 MG tablet Take 25 mg by mouth Every Other Day.   8/8/2018 at Unknown time       Current Meds  Scheduled Meds:    amLODIPine-lisinopril 10-40 mg combo dose  Oral Q24H   aspirin 81 mg Oral Daily   atorvastatin 20 mg Oral Nightly   calcium carbonate 5 tablet Oral  Daily   cholecalciferol 1,000 Units Oral Daily   insulin aspart 0-9 Units Subcutaneous 4x Daily With Meals & Nightly   insulin detemir 40 Units Subcutaneous Nightly   metoprolol succinate XL 12.5 mg Oral Nightly   nitroglycerin 1 inch Topical Q6H   [START ON 8/10/2018] pantoprazole 40 mg Oral Q AM   sodium chloride 10 mL Intravenous Q12H     Continuous Infusions:   PRN Meds:.•  acetaminophen  •  ALPRAZolam  •  dextrose  •  dextrose  •  diphenhydrAMINE  •  glucagon (human recombinant)  •  heparin flush (porcine)  •  magnesium sulfate **OR** magnesium sulfate in D5W 1g/100mL (PREMIX)  •  nitroglycerin  •  potassium chloride **OR** potassium chloride **OR** potassium chloride  •  sodium chloride  •  sodium chloride  •  sodium chloride  •  temazepam    Allergies as of 08/08/2018   • (No Known Allergies)       Social History     Social History   • Marital status:      Spouse name: N/A   • Number of children: N/A   • Years of education: N/A     Occupational History   • Not on file.     Social History Main Topics   • Smoking status: Former Smoker     Packs/day: 1.00     Types: Cigarettes     Start date: 1/1/1963     Quit date: 1/1/1975   • Smokeless tobacco: Never Used   • Alcohol use No   • Drug use: No   • Sexual activity: Defer     Other Topics Concern   • Not on file     Social History Narrative   • No narrative on file       Family History   Problem Relation Age of Onset   • Heart disease Mother    • Heart disease Sister        REVIEW OF SYSTEMS:   ROS was performed and is negative except as outlined in HPI     REVIEW OF SYSTEMS:   CONSTITUTIONAL: No weight loss, fever, chills, weakness or fatigue.   HEENT: Eyes: No visual loss, blurred vision, double vision or yellow sclerae. Ears, Nose, Throat: No hearing loss, sneezing, congestion, runny nose or sore throat.   SKIN: No rash or itching.     RESPIRATORY: No shortness of breath, hemoptysis, cough or sputum.   GASTROINTESTINAL: No anorexia, nausea, vomiting or  "diarrhea. No abdominal pain, bright red blood per rectum or melena.  GENITOURINARY: No burning on urination, hematuria or increased frequency.  NEUROLOGICAL: No headache, dizziness, syncope, paralysis, ataxia, numbness or tingling in the extremities. No change in bowel or bladder control.   MUSCULOSKELETAL: No muscle, back pain, joint pain or stiffness.   HEMATOLOGIC: No anemia, bleeding or bruising.   LYMPHATICS: No enlarged nodes. No history of splenectomy.   PSYCHIATRIC: No history of depression, anxiety, hallucinations.   ENDOCRINOLOGIC: No reports of sweating, cold or heat intolerance. No polyuria or polydipsia.       Objective:   Temp:  [98.1 °F (36.7 °C)-99.2 °F (37.3 °C)] 98.1 °F (36.7 °C)  Heart Rate:  [73-91] 84  Resp:  [18-20] 18  BP: (120-168)/(68-87) 147/87  Body mass index is 30.52 kg/m².  Flowsheet Rows      First Filed Value   Admission Height  182.9 cm (72\") Documented at 08/08/2018 2013   Admission Weight  102 kg (225 lb) Documented at 08/09/2018 0631        Vitals:    08/09/18 1542   BP: 147/87   Pulse: 84   Resp: 18   Temp: 98.1 °F (36.7 °C)   SpO2: 96%       Head:    Normocephalic, without obvious abnormality, atraumatic   Eyes:            Lids and lashes normal, conjunctivae and sclerae normal, no   icterus, no pallor   Ears:    Ears appear intact with no abnormalities noted   Throat:   No oral lesions, dentition good   Neck:   No adenopathy, supple, trachea midline, no thyromegaly, no   carotid bruit, no JVD   Lungs:     Breath sounds are equal and clear to auscultation    Heart:    Normal S1 and S2, RRR, No M/G/R   Abdomen:     Normal bowel sounds, no masses, no organomegaly, soft        non-tender, non-distended, no guarding   Extremities:   Moves all extremities well, no edema, no cyanosis, no redness   Pulses:   Pulses palpable and equal bilaterally.    Skin:  Psychiatric:   No bleeding, bruising or rash    Awake, alert and oriented x 3, normal mood and affect             EKG on " 8/9/18-    EKG on 8/8/18-      I personally viewed and interpreted the patient's EKG/Telemetry data    Assessment:  Active Hospital Problems    Diagnosis Date Noted   • Coronary artery disease involving native coronary artery of native heart with unstable angina pectoris (CMS/[facility]) [I25.110] 08/09/2018      Resolved Hospital Problems    Diagnosis Date Noted Date Resolved   No resolved problems to display.       Plan:This is a complex issue.  I think the primary problem is that he has sick sinus syndrome and I am going to ask the arrhythmia service to see him about whether we should put a pacemaker in and then just put him on sotalol to control his A-fib because it was controlling it well.  The other thing is he is not anticoagulated and he is severely anemic and he has low platelets, so he has got more than 1 line of bone marrow that does not seem to be working well, so we are going to have Hematology see him.  He also complains of edema in his feet.  He has significant protein in his urine and I am going to have Nephrology see him and see if they think this is nephrotic syndrome and I am also going to stop his amlodipine which I think could cause edema in his legs.  I do not know that we should charge in and do intervention on his arteries until we work out some of these other things first.  I have had a long discussion with him and his family and they agree and understand.             Rahul Simpson MD  08/09/18  4:13 PM.

## 2018-08-10 ENCOUNTER — APPOINTMENT (OUTPATIENT)
Dept: CARDIOLOGY | Facility: HOSPITAL | Age: 78
End: 2018-08-10
Attending: INTERNAL MEDICINE

## 2018-08-10 ENCOUNTER — APPOINTMENT (OUTPATIENT)
Dept: GENERAL RADIOLOGY | Facility: HOSPITAL | Age: 78
End: 2018-08-10

## 2018-08-10 LAB
ABO + RH BLD: NORMAL
ABO + RH BLD: NORMAL
ALBUMIN SERPL-MCNC: 3.3 G/DL (ref 3.5–5.2)
ALBUMIN/GLOB SERPL: 1.3 G/DL
ALP SERPL-CCNC: 52 U/L (ref 39–117)
ALT SERPL W P-5'-P-CCNC: 14 U/L (ref 1–41)
ANION GAP SERPL CALCULATED.3IONS-SCNC: 10.4 MMOL/L
AORTIC DIMENSIONLESS INDEX: 0.6 (DI)
APTT PPP: 28.6 SECONDS (ref 22.7–35.4)
AST SERPL-CCNC: 14 U/L (ref 1–40)
BASOPHILS # BLD AUTO: 0.01 10*3/MM3 (ref 0–0.2)
BASOPHILS NFR BLD AUTO: 0.4 % (ref 0–1.5)
BH BB BLOOD EXPIRATION DATE: NORMAL
BH BB BLOOD EXPIRATION DATE: NORMAL
BH BB BLOOD TYPE BARCODE: 5100
BH BB BLOOD TYPE BARCODE: 5100
BH BB DISPENSE STATUS: NORMAL
BH BB DISPENSE STATUS: NORMAL
BH BB PRODUCT CODE: NORMAL
BH BB PRODUCT CODE: NORMAL
BH BB UNIT NUMBER: NORMAL
BH BB UNIT NUMBER: NORMAL
BH CV ECHO MEAS - ACS: 2.1 CM
BH CV ECHO MEAS - AO MAX PG: 8 MMHG
BH CV ECHO MEAS - AO MEAN PG (FULL): 2 MMHG
BH CV ECHO MEAS - AO MEAN PG: 4 MMHG
BH CV ECHO MEAS - AO ROOT AREA (BSA CORRECTED): 1.7
BH CV ECHO MEAS - AO ROOT AREA: 11.9 CM^2
BH CV ECHO MEAS - AO ROOT DIAM: 3.9 CM
BH CV ECHO MEAS - AO V2 MAX: 138 CM/SEC
BH CV ECHO MEAS - AO V2 MEAN: 95.3 CM/SEC
BH CV ECHO MEAS - AO V2 VTI: 31.2 CM
BH CV ECHO MEAS - AVA(I,A): 2.6 CM^2
BH CV ECHO MEAS - AVA(I,D): 2.6 CM^2
BH CV ECHO MEAS - BSA(HAYCOCK): 2.3 M^2
BH CV ECHO MEAS - BSA: 2.2 M^2
BH CV ECHO MEAS - BZI_BMI: 30.5 KILOGRAMS/M^2
BH CV ECHO MEAS - BZI_METRIC_HEIGHT: 182.9 CM
BH CV ECHO MEAS - BZI_METRIC_WEIGHT: 102.1 KG
BH CV ECHO MEAS - CONTRAST EF (2CH): 48.5 ML/M^2
BH CV ECHO MEAS - CONTRAST EF 4CH: 49.3 ML/M^2
BH CV ECHO MEAS - EDV(CUBED): 140.6 ML
BH CV ECHO MEAS - EDV(MOD-SP2): 171 ML
BH CV ECHO MEAS - EDV(MOD-SP4): 215 ML
BH CV ECHO MEAS - EDV(TEICH): 129.5 ML
BH CV ECHO MEAS - EF(CUBED): 54.5 %
BH CV ECHO MEAS - EF(MOD-BP): 49 %
BH CV ECHO MEAS - EF(MOD-SP2): 48.5 %
BH CV ECHO MEAS - EF(MOD-SP4): 49.3 %
BH CV ECHO MEAS - EF(TEICH): 45.9 %
BH CV ECHO MEAS - ESV(CUBED): 64 ML
BH CV ECHO MEAS - ESV(MOD-SP2): 88 ML
BH CV ECHO MEAS - ESV(MOD-SP4): 109 ML
BH CV ECHO MEAS - ESV(TEICH): 70 ML
BH CV ECHO MEAS - FS: 23.1 %
BH CV ECHO MEAS - IVS/LVPW: 1.2
BH CV ECHO MEAS - IVSD: 1.6 CM
BH CV ECHO MEAS - LA DIMENSION: 4 CM
BH CV ECHO MEAS - LA/AO: 1
BH CV ECHO MEAS - LAT PEAK E' VEL: 7.2 CM/SEC
BH CV ECHO MEAS - LV DIASTOLIC VOL/BSA (35-75): 96 ML/M^2
BH CV ECHO MEAS - LV MASS(C)D: 325.8 GRAMS
BH CV ECHO MEAS - LV MASS(C)DI: 145.5 GRAMS/M^2
BH CV ECHO MEAS - LV MEAN PG: 2 MMHG
BH CV ECHO MEAS - LV SYSTOLIC VOL/BSA (12-30): 48.7 ML/M^2
BH CV ECHO MEAS - LV V1 MAX: 93 CM/SEC
BH CV ECHO MEAS - LV V1 MEAN: 61.8 CM/SEC
BH CV ECHO MEAS - LV V1 VTI: 19.3 CM
BH CV ECHO MEAS - LVIDD: 5.2 CM
BH CV ECHO MEAS - LVIDS: 4 CM
BH CV ECHO MEAS - LVLD AP2: 9.4 CM
BH CV ECHO MEAS - LVLD AP4: 9.2 CM
BH CV ECHO MEAS - LVLS AP2: 8.9 CM
BH CV ECHO MEAS - LVLS AP4: 8.7 CM
BH CV ECHO MEAS - LVOT AREA (M): 4.2 CM^2
BH CV ECHO MEAS - LVOT AREA: 4.2 CM^2
BH CV ECHO MEAS - LVOT DIAM: 2.3 CM
BH CV ECHO MEAS - LVPWD: 1.3 CM
BH CV ECHO MEAS - MED PEAK E' VEL: 5.2 CM/SEC
BH CV ECHO MEAS - MV A DUR: 0.14 SEC
BH CV ECHO MEAS - MV A MAX VEL: 114 CM/SEC
BH CV ECHO MEAS - MV DEC SLOPE: 224 CM/SEC^2
BH CV ECHO MEAS - MV DEC TIME: 0.1 SEC
BH CV ECHO MEAS - MV E MAX VEL: 67.1 CM/SEC
BH CV ECHO MEAS - MV E/A: 0.59
BH CV ECHO MEAS - MV MEAN PG: 3 MMHG
BH CV ECHO MEAS - MV P1/2T MAX VEL: 86.9 CM/SEC
BH CV ECHO MEAS - MV P1/2T: 113.6 MSEC
BH CV ECHO MEAS - MV V2 MEAN: 81.4 CM/SEC
BH CV ECHO MEAS - MV V2 VTI: 23.7 CM
BH CV ECHO MEAS - MVA P1/2T LCG: 2.5 CM^2
BH CV ECHO MEAS - MVA(P1/2T): 1.9 CM^2
BH CV ECHO MEAS - MVA(VTI): 3.4 CM^2
BH CV ECHO MEAS - PA ACC SLOPE: 870 CM/SEC^2
BH CV ECHO MEAS - PA ACC TIME: 0.11 SEC
BH CV ECHO MEAS - PA MAX PG: 3.6 MMHG
BH CV ECHO MEAS - PA PR(ACCEL): 30 MMHG
BH CV ECHO MEAS - PA V2 MAX: 95 CM/SEC
BH CV ECHO MEAS - PI END-D VEL: 130 CM/SEC
BH CV ECHO MEAS - PULM A REVS DUR: 0.16 SEC
BH CV ECHO MEAS - PULM A REVS VEL: 31.2 CM/SEC
BH CV ECHO MEAS - PULM DIAS VEL: 71 CM/SEC
BH CV ECHO MEAS - PULM S/D: 0.67
BH CV ECHO MEAS - PULM SYS VEL: 47.6 CM/SEC
BH CV ECHO MEAS - QP/QS: 1.1
BH CV ECHO MEAS - RV MEAN PG: 1 MMHG
BH CV ECHO MEAS - RV V1 MEAN: 56.2 CM/SEC
BH CV ECHO MEAS - RV V1 VTI: 17.4 CM
BH CV ECHO MEAS - RVOT AREA: 4.9 CM^2
BH CV ECHO MEAS - RVOT DIAM: 2.5 CM
BH CV ECHO MEAS - SI(AO): 166.4 ML/M^2
BH CV ECHO MEAS - SI(CUBED): 34.2 ML/M^2
BH CV ECHO MEAS - SI(LVOT): 35.8 ML/M^2
BH CV ECHO MEAS - SI(MOD-SP2): 37.1 ML/M^2
BH CV ECHO MEAS - SI(MOD-SP4): 47.3 ML/M^2
BH CV ECHO MEAS - SI(TEICH): 26.6 ML/M^2
BH CV ECHO MEAS - SV(AO): 372.7 ML
BH CV ECHO MEAS - SV(CUBED): 76.6 ML
BH CV ECHO MEAS - SV(LVOT): 80.2 ML
BH CV ECHO MEAS - SV(MOD-SP2): 83 ML
BH CV ECHO MEAS - SV(MOD-SP4): 106 ML
BH CV ECHO MEAS - SV(RVOT): 85.4 ML
BH CV ECHO MEAS - SV(TEICH): 59.5 ML
BH CV ECHO MEAS - TAPSE (>1.6): 2.4 CM2
BH CV ECHO MEASUREMENTS AVERAGE E/E' RATIO: 10.82
BH CV XLRA - RV BASE: 3.9 CM
BH CV XLRA - RV LENGTH: 8 CM
BH CV XLRA - RV MID: 3 CM
BH CV XLRA - TDI S': 9 CM/SEC
BILIRUB SERPL-MCNC: 0.4 MG/DL (ref 0.1–1.2)
BUN BLD-MCNC: 24 MG/DL (ref 8–23)
BUN/CREAT SERPL: 19.2 (ref 7–25)
CALCIUM SPEC-SCNC: 8.3 MG/DL (ref 8.6–10.5)
CHLORIDE SERPL-SCNC: 105 MMOL/L (ref 98–107)
CHOLEST SERPL-MCNC: 106 MG/DL (ref 0–200)
CO2 SERPL-SCNC: 25.6 MMOL/L (ref 22–29)
CREAT BLD-MCNC: 1.25 MG/DL (ref 0.76–1.27)
CRP SERPL-MCNC: 0.97 MG/DL (ref 0–0.5)
DAT POLY-SP REAG RBC QL: NEGATIVE
DEPRECATED RDW RBC AUTO: 51.4 FL (ref 37–54)
EOSINOPHIL # BLD AUTO: 0.18 10*3/MM3 (ref 0–0.7)
EOSINOPHIL NFR BLD AUTO: 6.7 % (ref 0.3–6.2)
ERYTHROCYTE [DISTWIDTH] IN BLOOD BY AUTOMATED COUNT: 15.5 % (ref 11.5–14.5)
ERYTHROCYTE [SEDIMENTATION RATE] IN BLOOD: 62 MM/HR (ref 0–20)
FERRITIN SERPL-MCNC: 210.8 NG/ML (ref 30–400)
FIBRINOGEN PPP-MCNC: 465 MG/DL (ref 219–464)
FOLATE SERPL-MCNC: 8.4 NG/ML (ref 4.78–24.2)
GFR SERPL CREATININE-BSD FRML MDRD: 56 ML/MIN/1.73
GLOBULIN UR ELPH-MCNC: 2.5 GM/DL
GLUCOSE BLD-MCNC: 106 MG/DL (ref 65–99)
GLUCOSE BLDC GLUCOMTR-MCNC: 106 MG/DL (ref 70–130)
GLUCOSE BLDC GLUCOMTR-MCNC: 130 MG/DL (ref 70–130)
GLUCOSE BLDC GLUCOMTR-MCNC: 328 MG/DL (ref 70–130)
GLUCOSE BLDC GLUCOMTR-MCNC: 89 MG/DL (ref 70–130)
HAPTOGLOB SERPL-MCNC: 126 MG/DL (ref 30–200)
HBA1C MFR BLD: 7.8 % (ref 4.8–5.6)
HCT VFR BLD AUTO: 29.9 % (ref 40.4–52.2)
HDLC SERPL-MCNC: 27 MG/DL (ref 40–60)
HGB BLD-MCNC: 9.4 G/DL (ref 13.7–17.6)
IMM GRANULOCYTES # BLD: 0 10*3/MM3 (ref 0–0.03)
IMM GRANULOCYTES NFR BLD: 0 % (ref 0–0.5)
INR PPP: 1.03 (ref 0.9–1.1)
IRON 24H UR-MRATE: 90 MCG/DL (ref 59–158)
IRON SATN MFR SERPL: 41 % (ref 20–50)
LDH SERPL-CCNC: 199 U/L (ref 135–225)
LDLC SERPL CALC-MCNC: 51 MG/DL (ref 0–100)
LDLC/HDLC SERPL: 1.9 {RATIO}
LEFT ATRIUM VOLUME INDEX: 35 ML/M2
LYMPHOCYTES # BLD AUTO: 0.8 10*3/MM3 (ref 0.9–4.8)
LYMPHOCYTES NFR BLD AUTO: 30 % (ref 19.6–45.3)
MAGNESIUM SERPL-MCNC: 2 MG/DL (ref 1.6–2.4)
MAXIMAL PREDICTED HEART RATE: 142 BPM
MCH RBC QN AUTO: 28.6 PG (ref 27–32.7)
MCHC RBC AUTO-ENTMCNC: 31.4 G/DL (ref 32.6–36.4)
MCV RBC AUTO: 90.9 FL (ref 79.8–96.2)
MONOCYTES # BLD AUTO: 0.21 10*3/MM3 (ref 0.2–1.2)
MONOCYTES NFR BLD AUTO: 7.9 % (ref 5–12)
NEUTROPHILS # BLD AUTO: 1.47 10*3/MM3 (ref 1.9–8.1)
NEUTROPHILS NFR BLD AUTO: 55 % (ref 42.7–76)
NT-PROBNP SERPL-MCNC: 1035 PG/ML (ref 0–1800)
PLATELET # BLD AUTO: 113 10*3/MM3 (ref 140–500)
PLATELET # BLD AUTO: 120 10*3/MM3 (ref 140–500)
PLATELETS.RETICULATED NFR BLD AUTO: 2.9 % (ref 0.9–6.5)
PMV BLD AUTO: 9.6 FL (ref 6–12)
POTASSIUM BLD-SCNC: 4 MMOL/L (ref 3.5–5.2)
PROT SERPL-MCNC: 5.8 G/DL (ref 6–8.5)
PROTHROMBIN TIME: 13.3 SECONDS (ref 11.7–14.2)
PSA SERPL-MCNC: 0.6 NG/ML (ref 0–4)
RBC # BLD AUTO: 3.29 10*6/MM3 (ref 4.6–6)
SODIUM BLD-SCNC: 141 MMOL/L (ref 136–145)
STRESS TARGET HR: 121 BPM
T4 FREE SERPL-MCNC: 1.05 NG/DL (ref 0.93–1.7)
TIBC SERPL-MCNC: 221 MCG/DL
TRANSFERRIN SERPL-MCNC: 148 MG/DL (ref 200–360)
TRIGL SERPL-MCNC: 139 MG/DL (ref 0–150)
TROPONIN T SERPL-MCNC: 0.22 NG/ML (ref 0–0.03)
TROPONIN T SERPL-MCNC: 0.24 NG/ML (ref 0–0.03)
TSH SERPL DL<=0.05 MIU/L-ACNC: 2.45 MIU/ML (ref 0.27–4.2)
UNIT  ABO: NORMAL
UNIT  ABO: NORMAL
UNIT  RH: NORMAL
UNIT  RH: NORMAL
VIT B12 BLD-MCNC: 345 PG/ML (ref 211–946)
VLDLC SERPL-MCNC: 27.8 MG/DL (ref 5–40)
WBC NRBC COR # BLD: 2.67 10*3/MM3 (ref 4.5–10.7)

## 2018-08-10 PROCEDURE — 86644 CMV ANTIBODY: CPT | Performed by: INTERNAL MEDICINE

## 2018-08-10 PROCEDURE — C1898 LEAD, PMKR, OTHER THAN TRANS: HCPCS | Performed by: INTERNAL MEDICINE

## 2018-08-10 PROCEDURE — 85384 FIBRINOGEN ACTIVITY: CPT | Performed by: INTERNAL MEDICINE

## 2018-08-10 PROCEDURE — 93306 TTE W/DOPPLER COMPLETE: CPT | Performed by: INTERNAL MEDICINE

## 2018-08-10 PROCEDURE — 93010 ELECTROCARDIOGRAM REPORT: CPT | Performed by: INTERNAL MEDICINE

## 2018-08-10 PROCEDURE — 85652 RBC SED RATE AUTOMATED: CPT | Performed by: INTERNAL MEDICINE

## 2018-08-10 PROCEDURE — 33225 L VENTRIC PACING LEAD ADD-ON: CPT | Performed by: INTERNAL MEDICINE

## 2018-08-10 PROCEDURE — 71045 X-RAY EXAM CHEST 1 VIEW: CPT

## 2018-08-10 PROCEDURE — 86645 CMV ANTIBODY IGM: CPT | Performed by: INTERNAL MEDICINE

## 2018-08-10 PROCEDURE — C1900 LEAD, CORONARY VENOUS: HCPCS | Performed by: INTERNAL MEDICINE

## 2018-08-10 PROCEDURE — 25010000002 FENTANYL CITRATE (PF) 100 MCG/2ML SOLUTION: Performed by: INTERNAL MEDICINE

## 2018-08-10 PROCEDURE — 83615 LACTATE (LD) (LDH) ENZYME: CPT | Performed by: HOSPITALIST

## 2018-08-10 PROCEDURE — 84484 ASSAY OF TROPONIN QUANT: CPT | Performed by: NURSE PRACTITIONER

## 2018-08-10 PROCEDURE — 83036 HEMOGLOBIN GLYCOSYLATED A1C: CPT | Performed by: INTERNAL MEDICINE

## 2018-08-10 PROCEDURE — C2621 PMKR, OTHER THAN SING/DUAL: HCPCS | Performed by: INTERNAL MEDICINE

## 2018-08-10 PROCEDURE — 86140 C-REACTIVE PROTEIN: CPT | Performed by: INTERNAL MEDICINE

## 2018-08-10 PROCEDURE — 84153 ASSAY OF PSA TOTAL: CPT | Performed by: INTERNAL MEDICINE

## 2018-08-10 PROCEDURE — 85730 THROMBOPLASTIN TIME PARTIAL: CPT | Performed by: THORACIC SURGERY (CARDIOTHORACIC VASCULAR SURGERY)

## 2018-08-10 PROCEDURE — 83880 ASSAY OF NATRIURETIC PEPTIDE: CPT | Performed by: THORACIC SURGERY (CARDIOTHORACIC VASCULAR SURGERY)

## 2018-08-10 PROCEDURE — 0JH607Z INSERTION OF CARDIAC RESYNCHRONIZATION PACEMAKER PULSE GENERATOR INTO CHEST SUBCUTANEOUS TISSUE AND FASCIA, OPEN APPROACH: ICD-10-PCS | Performed by: INTERNAL MEDICINE

## 2018-08-10 PROCEDURE — 99223 1ST HOSP IP/OBS HIGH 75: CPT | Performed by: INTERNAL MEDICINE

## 2018-08-10 PROCEDURE — 82746 ASSAY OF FOLIC ACID SERUM: CPT | Performed by: HOSPITALIST

## 2018-08-10 PROCEDURE — 93005 ELECTROCARDIOGRAM TRACING: CPT | Performed by: NURSE PRACTITIONER

## 2018-08-10 PROCEDURE — 86664 EPSTEIN-BARR NUCLEAR ANTIGEN: CPT | Performed by: INTERNAL MEDICINE

## 2018-08-10 PROCEDURE — 85610 PROTHROMBIN TIME: CPT | Performed by: THORACIC SURGERY (CARDIOTHORACIC VASCULAR SURGERY)

## 2018-08-10 PROCEDURE — 82962 GLUCOSE BLOOD TEST: CPT

## 2018-08-10 PROCEDURE — 63710000001 INSULIN ASPART PER 5 UNITS: Performed by: THORACIC SURGERY (CARDIOTHORACIC VASCULAR SURGERY)

## 2018-08-10 PROCEDURE — 82728 ASSAY OF FERRITIN: CPT | Performed by: HOSPITALIST

## 2018-08-10 PROCEDURE — 84443 ASSAY THYROID STIM HORMONE: CPT | Performed by: THORACIC SURGERY (CARDIOTHORACIC VASCULAR SURGERY)

## 2018-08-10 PROCEDURE — 86663 EPSTEIN-BARR ANTIBODY: CPT | Performed by: INTERNAL MEDICINE

## 2018-08-10 PROCEDURE — 85055 RETICULATED PLATELET ASSAY: CPT | Performed by: INTERNAL MEDICINE

## 2018-08-10 PROCEDURE — 86665 EPSTEIN-BARR CAPSID VCA: CPT | Performed by: INTERNAL MEDICINE

## 2018-08-10 PROCEDURE — 83010 ASSAY OF HAPTOGLOBIN QUANT: CPT | Performed by: INTERNAL MEDICINE

## 2018-08-10 PROCEDURE — 86880 COOMBS TEST DIRECT: CPT | Performed by: INTERNAL MEDICINE

## 2018-08-10 PROCEDURE — 25010000002 PERFLUTREN (DEFINITY) 8.476 MG IN SODIUM CHLORIDE 0.9 % 10 ML INJECTION: Performed by: INTERNAL MEDICINE

## 2018-08-10 PROCEDURE — 80061 LIPID PANEL: CPT | Performed by: HOSPITALIST

## 2018-08-10 PROCEDURE — 33208 INSRT HEART PM ATRIAL & VENT: CPT | Performed by: INTERNAL MEDICINE

## 2018-08-10 PROCEDURE — 82607 VITAMIN B-12: CPT | Performed by: HOSPITALIST

## 2018-08-10 PROCEDURE — 25010000002 MIDAZOLAM PER 1 MG: Performed by: INTERNAL MEDICINE

## 2018-08-10 PROCEDURE — 63710000001 INSULIN DETEMIR PER 5 UNITS: Performed by: HOSPITALIST

## 2018-08-10 PROCEDURE — 02HL3JZ INSERTION OF PACEMAKER LEAD INTO LEFT VENTRICLE, PERCUTANEOUS APPROACH: ICD-10-PCS | Performed by: INTERNAL MEDICINE

## 2018-08-10 PROCEDURE — 25010000002 VANCOMYCIN PER 500 MG: Performed by: INTERNAL MEDICINE

## 2018-08-10 PROCEDURE — 84439 ASSAY OF FREE THYROXINE: CPT | Performed by: THORACIC SURGERY (CARDIOTHORACIC VASCULAR SURGERY)

## 2018-08-10 PROCEDURE — C1731 CATH, EP, 20 OR MORE ELEC: HCPCS | Performed by: INTERNAL MEDICINE

## 2018-08-10 PROCEDURE — 83540 ASSAY OF IRON: CPT | Performed by: HOSPITALIST

## 2018-08-10 PROCEDURE — C1894 INTRO/SHEATH, NON-LASER: HCPCS | Performed by: INTERNAL MEDICINE

## 2018-08-10 PROCEDURE — 99232 SBSQ HOSP IP/OBS MODERATE 35: CPT | Performed by: INTERNAL MEDICINE

## 2018-08-10 PROCEDURE — 85025 COMPLETE CBC W/AUTO DIFF WBC: CPT | Performed by: HOSPITALIST

## 2018-08-10 PROCEDURE — 83735 ASSAY OF MAGNESIUM: CPT | Performed by: INTERNAL MEDICINE

## 2018-08-10 PROCEDURE — 02H43JZ INSERTION OF PACEMAKER LEAD INTO CORONARY VEIN, PERCUTANEOUS APPROACH: ICD-10-PCS | Performed by: INTERNAL MEDICINE

## 2018-08-10 PROCEDURE — 93306 TTE W/DOPPLER COMPLETE: CPT

## 2018-08-10 PROCEDURE — C1892 INTRO/SHEATH,FIXED,PEEL-AWAY: HCPCS | Performed by: INTERNAL MEDICINE

## 2018-08-10 PROCEDURE — 02H63JZ INSERTION OF PACEMAKER LEAD INTO RIGHT ATRIUM, PERCUTANEOUS APPROACH: ICD-10-PCS | Performed by: INTERNAL MEDICINE

## 2018-08-10 PROCEDURE — 84466 ASSAY OF TRANSFERRIN: CPT | Performed by: HOSPITALIST

## 2018-08-10 PROCEDURE — 82668 ASSAY OF ERYTHROPOIETIN: CPT | Performed by: INTERNAL MEDICINE

## 2018-08-10 PROCEDURE — 80053 COMPREHEN METABOLIC PANEL: CPT | Performed by: THORACIC SURGERY (CARDIOTHORACIC VASCULAR SURGERY)

## 2018-08-10 DEVICE — GEN PM ALLURE QUADRA BIVENT RF CRTP PM3262: Type: IMPLANTABLE DEVICE | Status: FUNCTIONAL

## 2018-08-10 DEVICE — LD PM MRI TENDRIL LPA1200M58: Type: IMPLANTABLE DEVICE | Site: HEART | Status: FUNCTIONAL

## 2018-08-10 DEVICE — LD QUARTET LV DBL BEND 1457Q/86: Type: IMPLANTABLE DEVICE | Site: HEART | Status: FUNCTIONAL

## 2018-08-10 DEVICE — LD PM MRI TENDRIL LPA1200M52: Type: IMPLANTABLE DEVICE | Site: HEART | Status: FUNCTIONAL

## 2018-08-10 RX ORDER — ATORVASTATIN CALCIUM 10 MG/1
10 TABLET, FILM COATED ORAL NIGHTLY
Status: DISCONTINUED | OUTPATIENT
Start: 2018-08-10 | End: 2018-08-14 | Stop reason: HOSPADM

## 2018-08-10 RX ORDER — MIDAZOLAM HYDROCHLORIDE 1 MG/ML
INJECTION INTRAMUSCULAR; INTRAVENOUS AS NEEDED
Status: DISCONTINUED | OUTPATIENT
Start: 2018-08-10 | End: 2018-08-10 | Stop reason: HOSPADM

## 2018-08-10 RX ORDER — HYDROCODONE BITARTRATE AND ACETAMINOPHEN 5; 325 MG/1; MG/1
1 TABLET ORAL EVERY 4 HOURS PRN
Status: DISCONTINUED | OUTPATIENT
Start: 2018-08-10 | End: 2018-08-14 | Stop reason: HOSPADM

## 2018-08-10 RX ORDER — ACETAMINOPHEN 160 MG/5ML
650 SOLUTION ORAL EVERY 4 HOURS PRN
Status: DISCONTINUED | OUTPATIENT
Start: 2018-08-10 | End: 2018-08-14 | Stop reason: HOSPADM

## 2018-08-10 RX ORDER — VANCOMYCIN HYDROCHLORIDE 1 G/200ML
1000 INJECTION, SOLUTION INTRAVENOUS
Status: DISCONTINUED | OUTPATIENT
Start: 2018-08-11 | End: 2018-08-11

## 2018-08-10 RX ORDER — LIDOCAINE HYDROCHLORIDE 10 MG/ML
INJECTION, SOLUTION INFILTRATION; PERINEURAL AS NEEDED
Status: DISCONTINUED | OUTPATIENT
Start: 2018-08-10 | End: 2018-08-10 | Stop reason: HOSPADM

## 2018-08-10 RX ORDER — ONDANSETRON 2 MG/ML
4 INJECTION INTRAMUSCULAR; INTRAVENOUS EVERY 6 HOURS PRN
Status: DISCONTINUED | OUTPATIENT
Start: 2018-08-10 | End: 2018-08-14 | Stop reason: HOSPADM

## 2018-08-10 RX ORDER — NALOXONE HCL 0.4 MG/ML
0.4 VIAL (ML) INJECTION
Status: DISCONTINUED | OUTPATIENT
Start: 2018-08-10 | End: 2018-08-14 | Stop reason: HOSPADM

## 2018-08-10 RX ORDER — ACETAMINOPHEN 325 MG/1
650 TABLET ORAL EVERY 4 HOURS PRN
Status: DISCONTINUED | OUTPATIENT
Start: 2018-08-10 | End: 2018-08-14 | Stop reason: HOSPADM

## 2018-08-10 RX ORDER — ACETAMINOPHEN 650 MG/1
650 SUPPOSITORY RECTAL EVERY 4 HOURS PRN
Status: DISCONTINUED | OUTPATIENT
Start: 2018-08-10 | End: 2018-08-14 | Stop reason: HOSPADM

## 2018-08-10 RX ORDER — SOTALOL HYDROCHLORIDE 80 MG/1
80 TABLET ORAL EVERY 12 HOURS SCHEDULED
Status: DISCONTINUED | OUTPATIENT
Start: 2018-08-10 | End: 2018-08-14 | Stop reason: HOSPADM

## 2018-08-10 RX ORDER — SODIUM CHLORIDE 0.9 % (FLUSH) 0.9 %
1-10 SYRINGE (ML) INJECTION AS NEEDED
Status: DISCONTINUED | OUTPATIENT
Start: 2018-08-10 | End: 2018-08-14 | Stop reason: HOSPADM

## 2018-08-10 RX ORDER — FENTANYL CITRATE 50 UG/ML
INJECTION, SOLUTION INTRAMUSCULAR; INTRAVENOUS AS NEEDED
Status: DISCONTINUED | OUTPATIENT
Start: 2018-08-10 | End: 2018-08-10 | Stop reason: HOSPADM

## 2018-08-10 RX ORDER — SODIUM CHLORIDE 9 MG/ML
INJECTION, SOLUTION INTRAVENOUS CONTINUOUS PRN
Status: COMPLETED | OUTPATIENT
Start: 2018-08-10 | End: 2018-08-10

## 2018-08-10 RX ORDER — VANCOMYCIN HYDROCHLORIDE 1 G/200ML
INJECTION, SOLUTION INTRAVENOUS CONTINUOUS PRN
Status: COMPLETED | OUTPATIENT
Start: 2018-08-10 | End: 2018-08-10

## 2018-08-10 RX ORDER — HYDROCODONE BITARTRATE AND ACETAMINOPHEN 10; 325 MG/1; MG/1
1 TABLET ORAL EVERY 4 HOURS PRN
Status: DISCONTINUED | OUTPATIENT
Start: 2018-08-10 | End: 2018-08-14 | Stop reason: HOSPADM

## 2018-08-10 RX ORDER — CHOLECALCIFEROL (VITAMIN D3) 125 MCG
500 CAPSULE ORAL DAILY
Status: DISCONTINUED | OUTPATIENT
Start: 2018-08-10 | End: 2018-08-14 | Stop reason: HOSPADM

## 2018-08-10 RX ORDER — HYDROMORPHONE HCL 110MG/55ML
0.5 PATIENT CONTROLLED ANALGESIA SYRINGE INTRAVENOUS
Status: DISCONTINUED | OUTPATIENT
Start: 2018-08-10 | End: 2018-08-14 | Stop reason: HOSPADM

## 2018-08-10 RX ADMIN — PANTOPRAZOLE SODIUM 40 MG: 40 TABLET, DELAYED RELEASE ORAL at 06:04

## 2018-08-10 RX ADMIN — INSULIN DETEMIR 40 UNITS: 100 INJECTION, SOLUTION SUBCUTANEOUS at 22:06

## 2018-08-10 RX ADMIN — METOPROLOL SUCCINATE 12.5 MG: 25 TABLET, FILM COATED, EXTENDED RELEASE ORAL at 22:03

## 2018-08-10 RX ADMIN — SOTALOL HYDROCHLORIDE 80 MG: 80 TABLET ORAL at 22:05

## 2018-08-10 RX ADMIN — VITAMIN D, TAB 1000IU (100/BT) 1000 UNITS: 25 TAB at 18:28

## 2018-08-10 RX ADMIN — LISINOPRIL 40 MG: 40 TABLET ORAL at 09:20

## 2018-08-10 RX ADMIN — Medication 10 ML: at 22:08

## 2018-08-10 RX ADMIN — Medication 10 ML: at 09:20

## 2018-08-10 RX ADMIN — PERFLUTREN 2.5 ML: 6.52 INJECTION, SUSPENSION INTRAVENOUS at 08:52

## 2018-08-10 RX ADMIN — ATORVASTATIN CALCIUM 10 MG: 10 TABLET, FILM COATED ORAL at 22:02

## 2018-08-10 RX ADMIN — INSULIN ASPART 7 UNITS: 100 INJECTION, SOLUTION INTRAVENOUS; SUBCUTANEOUS at 22:05

## 2018-08-10 RX ADMIN — HYDROCODONE BITARTRATE AND ACETAMINOPHEN 1 TABLET: 5; 325 TABLET ORAL at 22:18

## 2018-08-10 NOTE — PROGRESS NOTES
"    NEPHROLOGY PROGRESS NOTE    PATIENT IDENTIFICATION:   Name:  Ruben Milian      MRN:  [id number]     78 y.o.  male             Reason for visit: CKD2-3    SUBJECTIVE:  Feels fine; no CP overnight; no SOB    OBJECTIVE:  Vitals:    08/10/18 0050 08/10/18 0807 08/10/18 0821 08/10/18 1108   BP: 121/61 164/80 164/80 158/67   BP Location:  Right arm  Left arm   Patient Position:  Lying  Lying   Pulse: 73 73 73    Resp: 16 16  16   Temp: 98 °F (36.7 °C) 98.1 °F (36.7 °C)  98 °F (36.7 °C)   TempSrc: Oral Oral  Oral   SpO2: 95% 97%  95%   Weight:   102 kg (225 lb)    Height:   182.9 cm (72\")            Body mass index is 30.52 kg/m².    Intake/Output Summary (Last 24 hours) at 08/10/18 1507  Last data filed at 08/10/18 1336   Gross per 24 hour   Intake              840 ml   Output             1400 ml   Net             -560 ml     Wt Readings from Last 1 Encounters:   08/10/18 0821 102 kg (225 lb)   08/09/18 0631 102 kg (225 lb)     Wt Readings from Last 3 Encounters:   08/10/18 102 kg (225 lb)         Exam:  NAD; pleasant; oriented; looks stated age  MMM; AT/NC  No eye d/c; no scleral icterus  No JVD; bilateral carotid bruits  Coarse bilat; not labored  RRR, no rub; II/VI sys murm  Soft, NT, +D, BS+  +1-2 edema  +clubbing; +Dupuytren's contractures  No asterixis  Moves all extremities   Mood and affect normal  No skin tenting  Speech fluent    Scheduled meds:    aspirin 81 mg Oral Daily   atorvastatin 10 mg Oral Nightly   calcium carbonate 5 tablet Oral Daily   cholecalciferol 1,000 Units Oral Daily   insulin aspart 0-9 Units Subcutaneous 4x Daily With Meals & Nightly   insulin detemir 40 Units Subcutaneous Nightly   lisinopril 40 mg Oral Q24H   metoprolol succinate XL 12.5 mg Oral Nightly   pantoprazole 40 mg Oral Q AM   sodium chloride 10 mL Intravenous Q12H   [START ON 8/11/2018] vancomycin 1,000 mg Intravenous On Call   vitamin B-12 500 mcg Oral Daily     IV meds:                           Data Review:      Results " from last 7 days  Lab Units 08/10/18  0329 08/09/18  0545 08/08/18  2303   SODIUM mmol/L 141 141  --    POTASSIUM mmol/L 4.0 4.6 4.7   CHLORIDE mmol/L 105 103  --    CO2 mmol/L 25.6 28.1  --    BUN mg/dL 24* 22  --    CREATININE mg/dL 1.25 1.20  --    CALCIUM mg/dL 8.3* 8.5*  --    BILIRUBIN mg/dL 0.4  --   --    ALK PHOS U/L 52  --   --    ALT (SGPT) U/L 14  --   --    AST (SGOT) U/L 14  --   --    GLUCOSE mg/dL 106* 159*  --      Estimated Creatinine Clearance: 60.2 mL/min (by C-G formula based on SCr of 1.25 mg/dL).    Results from last 7 days  Lab Units 08/09/18  2100   CREATININE UR mg/dL 92.3   PROTEIN TOTAL URINE mg/dL 282.0       Results from last 7 days  Lab Units 08/10/18  0329 08/08/18  2303   MAGNESIUM mg/dL 2.0 2.0         Results from last 7 days  Lab Units 08/10/18  0329 08/09/18  0545   WBC 10*3/mm3 2.67* 2.36*   HEMOGLOBIN g/dL 9.4* 7.8*   PLATELETS 10*3/mm3 113* 114*         Results from last 7 days  Lab Units 08/10/18  0329   INR  1.03             ASSESSMENT:   Active Problems:    Coronary artery disease involving native coronary artery of native heart with unstable angina pectoris (CMS/HCC)    1.  CKD2-3: stable renal fxn and at baseline.  Peripheral vol excess, tho central vol is fine.  Lytes fine.  UJprot:cr predicts 3 grams daily; this is likely on the basis of diabetic nephropathy  2.  Recent AMI  3.  Recent heart block and known pAfib  4.  Long-standing DM2 for at least 15 years  5.  Prostate CA with concern for bone mets  6.  Pancytopenia      PLAN:  1.  Continue lisinopril 40 mg daily, tho will titrate upwards again on dose if BP's do not trend lower soon  2.  Heme eval pending  3.  Possible PPM  4.  Surveillance labs    En Waller MD  8/10/2018    3:07 PM

## 2018-08-10 NOTE — CONSULTS
[facility] INITIAL INPATIENT CONSULTATION NOTE    REASON FOR CONSULTATION:  Metastatic prostate cancer to bone, pancytopenia.    HISTORY OF PRESENT ILLNESS:  Ruben Milian is a 78 y.o. male who we are asked to see today in consultation for the above issues.  The patient is from [place] and is followed by oncology at [facility], previously seen by Dr. Mitch Coleman and most recently seen in June 2018 by nurse practitioner in their practice.  He is continuing on Trelstar injection every 2 months as well as monthly Xgeva.  He is currently admitted with atrial fibrillation with rapid ventricular response, unstable angina with non-ST elevation MI, sick sinus syndrome.  He is currently pancytopenic and we are asked to see him regarding ability to proceed with possible cardiac catheterization with stent that will require antiplatelet therapy.    In reviewing the patient's records and discussing the situation with Dr. Balwinder Alcaraz, now practicing at [facility] in the clinic previously occupied by Dr. Coleman, appears that the patient continues with excellent control of his disease by his most recent evaluation in June 2018 in regards to prostate cancer.  The patient was diagnosed with metastatic prostate cancer in 2015.  On 7/28/15, he had a PSA of 321.7.  Prostate biopsy 11/6/15 showed Jane score 4+5 equal to 9 disease with PSA that escalated up to 606.9.  On 11/2/15, the patient had a bone scan that showed diffuse skeletal involvement from prostate cancer with CT scan showing evidence of pelvic lymphadenopathy and confirming bony metastases.  The patient had experienced a 30 pound weight loss at that time and was experiencing bony pain.  He received Casodex ×7 days and then was placed on Trelstar which he has continued, currently every 2 months.  He received 6 cycles of Taxotere chemotherapy completed 3/4/16.  He has continued on monthly Xgeva as well.  His most recent evaluation  with PSA that remains suppressed at 0.32 on 5/2/18 and 6 month interval bone scan 6/11/18 showed evidence of diffuse sclerotic lesions with no evidence of recurrent uptake consistent with treated metastases throughout the skeleton.  This was confirmed on CT scan 6/11/18 with no other abnormal findings.  Therefore the patient continued on his current treatment with every 2 month PSA monitoring and every 6 month CT and bone scan.  Dr. Alcaraz did review the patient's labs and indicates that his platelet count has been in the 142 180,000 range WBC since completion of chemotherapy that has remained in the 3-4000 range.  He has had a chronic anemia with hemoglobin in the 9-10 range.    The patient was admitted to [facility] 8/4 through 8/6/18 with bradycardia and hypotension.  Sotalol was discontinued.  He was then discharged home.  Labs from 8/4/18 showed a WBC 4.14, normal differential, hemoglobin 10.2, platelet count 154,000, creatinine 1.66.  Labs from 8/6/18 with WBC 2.54, hemoglobin 10, platelet count 135,000 with differential of 57 segs, 24 lymphs.  Additional labs today with iron 90, ferritin 210, iron saturation 41%, TIBC 221, folate 8.4, B12 345, , creatinine 1.25.  Echocardiogram with ejection fraction 41-45 percent.    The patient is now admitted with atrial fibrillation, rapid ventricular response and was felt to have sick sinus syndrome.  He also has a non-ST elevation MI.  He was placed on a temporary pacemaker.  He is going today for permanent pacemaker placement.  There was initial discussion regarding potential bypass surgery however that has been abandoned and the patient is now considered for possible catheterization and cardiac stent.  We are asked to comment regarding the patient's anemia and thrombocytopenia in light of potential stent placement with requirement for antiplatelet therapy.  On admission 8/9/18, WBC was 2.36 with hemoglobin 7.8 and play the count 114,000.  He was  transfused 2 units PRBC.  Today, hemoglobin is up to 9.4 with WBC 2.67, differential of 55 segs, 30 lymphs, 8 monocytes and platelet count 113,000. Additional labs today with iron 90, ferritin 210, iron saturation 41%, TIBC 221, folate 8.4, B12 345, , creatinine 1.25.  Echocardiogram with ejection fraction 41-45 percent.    Currently, the patient denies any chest pain.  He does report dyspnea on exertion.  He has experienced mild lower extremity edema area he denies any fevers nor night sweats.  He has some chronic proximal lower extremity weakness related to his androgen deprivation therapy.    Past Medical History:   Diagnosis Date   • Cancer (CMS/HCC)     prostate   • Diabetes (CMS/[facility])    • H/O angioplasty    • Peptic ulcer        Past Surgical History:   Procedure Laterality Date   • AMPUTATION FINGER / THUMB     • APPENDECTOMY           SOCIAL HISTORY:  Social History     Social History   • Marital status:      Spouse name: N/A   • Number of children: N/A   • Years of education: N/A     Occupational History   • Not on file.     Social History Main Topics   • Smoking status: Former Smoker     Packs/day: 1.00     Types: Cigarettes     Start date: 1/1/1963     Quit date: 1/1/1975   • Smokeless tobacco: Never Used   • Alcohol use No   • Drug use: No   • Sexual activity: Defer     Other Topics Concern   • Not on file     Social History Narrative   • No narrative on file         FAMILY HISTORY:  Family History   Problem Relation Age of Onset   • Heart disease Mother    • Heart disease Sister          ALLERGIES:  No Known Allergies    MEDICATIONS:  As listed in the electronic medical record.    Review of Systems   Constitutional: Positive for fatigue. Negative for activity change, appetite change, fever and unexpected weight change.   HENT: Negative for congestion, mouth sores, nosebleeds, sore throat and voice change.    Respiratory: Positive for shortness of breath. Negative for cough and wheezing.   "  Cardiovascular: Positive for leg swelling. Negative for chest pain and palpitations.   Gastrointestinal: Negative for abdominal distention, abdominal pain, blood in stool, constipation, diarrhea, nausea and vomiting.   Endocrine: Negative for cold intolerance and heat intolerance.   Genitourinary: Negative for difficulty urinating, dysuria, frequency and hematuria.   Musculoskeletal: Negative for arthralgias, back pain, joint swelling and myalgias.   Skin: Negative for rash.   Neurological: Positive for weakness. Negative for dizziness, syncope, light-headedness, numbness and headaches.   Hematological: Negative for adenopathy. Does not bruise/bleed easily.   Psychiatric/Behavioral: Negative for confusion and sleep disturbance. The patient is not nervous/anxious.        /67 (BP Location: Left arm, Patient Position: Lying)   Pulse 73   Temp 98 °F (36.7 °C) (Oral)   Resp 16   Ht 182.9 cm (72\")   Wt 102 kg (225 lb)   SpO2 95%   BMI 30.52 kg/m²     Physical Exam   Constitutional: He is oriented to person, place, and time.   Elderly gentleman in no distress sitting on the side of the bed   HENT:   Mouth/Throat: Oropharynx is clear and moist.   Eyes: Conjunctivae are normal.   Neck: No thyromegaly present.   Cardiovascular: Exam reveals no gallop and no friction rub.    No murmur heard.  Irregularly irregular   Pulmonary/Chest: Breath sounds normal. No respiratory distress.   Abdominal: Soft. Bowel sounds are normal. He exhibits no distension. There is no tenderness.   Musculoskeletal: He exhibits edema.   Trace bilateral lower extremity edema   Lymphadenopathy:        Head (right side): No submandibular adenopathy present.     He has no cervical adenopathy.     He has no axillary adenopathy.        Right: No inguinal and no supraclavicular adenopathy present.        Left: No inguinal and no supraclavicular adenopathy present.   Neurological: He is alert and oriented to person, place, and time. He displays " normal reflexes. No cranial nerve deficit. He exhibits normal muscle tone.   Skin: Skin is warm and dry. No rash noted.   Psychiatric: He has a normal mood and affect. His behavior is normal.       DIAGNOSTIC DATA:  As outlined in the history of present illness.    ASSESSMENT:    1. Metastatic prostate cancer to bone and pelvic lymph nodes:  · Previously followed by Dr. Coleman and subsequently followed by Dr. Alcaraz at [facility]  · Diagnosis in 2015,  on 7/28/15.  Prostate biopsy 11/6/15 with Jane score 4+5 equal to 9 disease, PSA up to 646.  · Baseline bone scan 11/2/15 with extensive skeletal metastases.  CT scan confirmed metastatic bony disease as well as pelvic lymphadenopathy.  · Treated with androgen deprivation therapy using Trelstar and also has received monthly Xgeva.  · Received chemotherapy with 6 cycles of Taxotere completed 3/4/16  · Subsequent continuation of Trelstar injection every 2 months and monthly Xgeva with PSA monitoring every 2 months and CT/bone scan every 6 months.  · Most recent PSA 5/2/18 was 0.32  · Most recent bone scan 6/11/18 with diffuse sclerotic bony lesions with no uptake consistent with treated metastatic disease.  CT scan with continued evidence of sclerotic bone disease but no other findings.  · At this point the patient appears to have continued hormone responsive well-controlled metastatic prostate cancer.  We will recheck a PSA today and based on this finding consider the need for any additional radiographic studies.  · In regards to management of the patient's cardiac disease, he does have metastatic prostate cancer and therefore would not recommend extreme measures such as bypass surgery however would still pursue pacemaker placement, cardiac catheterization with stent placement if needed.  2. Leukopenia/thrombocytopenia:  · In reviewing labs from the patient's previous record at [facility], he has had a borderline platelet count in the  140-180,000 range and a white count in the 3-4000 range for some time, likely related to sclerotic change in the bones and residual effects from previous chemotherapy with producing poor marrow reserve.  · Unclear if the more precipitous drop in the patient's counts recently represents inability of his marrow to respond to stress versus another acute process.  · B12 and folate are normal however B12 is in low normal range at 345 and I will go ahead and begin oral B12 500 µg daily.  This is likely not a cause however of his cytopenias.  · As above we are investigating to rule out evidence of progressive metastatic prostate cancer to bone as a potential cause.  · We will obtain additional lab studies including peripheral blood flow cytometry as well as CMV and EBV serologies and IPF, fibrinogen.  · In regards to thrombocytopenia, appears to have plateaued at this point and remains in an acceptable range for patient to receive antiplatelet therapy if needed from standpoint of cardiac stent.  3. Normocytic anemia:  · Patient appears to have a chronic anemia with hemoglobin in the 9-10 range, in part related to anemia of chronic disease/malignancy  · He does have stage III CKD creatinine 1.25 and GFR in the 50-60 range which may be a contributing factor and we will check an erythropoietin level.  · Hemoglobin with significant decline on admission to 7.8, received 2 units PRBC with improvement to 9.4 today.  No clinical evidence of GI blood loss  · We will check stool for occult blood  · There does not appear to be any evidence for hemolysis with normal LDH, normal AST, no evidence of antibody on type and cross.  We will obtain a haptoglobin and MAURI.  4. Sick sinus syndrome/paroxysmal atrial fibrillation:  · Note plans for permanent pacemaker placement today  5. Non-ST elevation MI:  · Discussion regarding potential cardiac catheterization with stent placement  · If the platelet count stabilizes in the current range, does  not appear to be any specific contraindication to pursue stent placement and antiplatelet therapy.    Plan:  1. Additional labs today with PSA, erythropoietin level, haptoglobin, MAURI, peripheral blood flow cytometry, EBV/CMV serologies, ESR, CRP, fibrinogen, IPF  2. Decision regarding radiographic evaluation of prostate cancer pending PSA.  3. Check stool for occult blood  4. Oral B12 500 µg daily  5. Daily CBC/differential and CMP in a.m.

## 2018-08-10 NOTE — PROGRESS NOTES
"Daily progress note    Complaining  Still short of breath with minimal exertion   Denies chest pain    History of present illness  78-year-old white male with history of prostate cancer status post chemotherapy other medical problem diabetes mellitus hypertension peptic ulcer disease admitted to [facility] with unstable angina and severe bradycardia and underwent cardiac catheterization which showed severe coronary artery disease transferred to [facility], operated by [facility] for further management.  I'm asked to follow the patient for medical problems.  At the time of interview his denies any chest pain shortness of breath but is still very weak also denies any fever chills cough nausea vomiting.  Patient has no palpitation at the time of interview.     Physical Exam    Blood pressure 158/67, pulse 73, temperature 98 °F (36.7 °C), temperature source Oral, resp. rate 16, height 182.9 cm (72\"), weight 102 kg (225 lb), SpO2 95 %.    Constitutional: He is oriented to person, place, and time. He appears well-developed and well-nourished. He is cooperative.   Eyes: No scleral icterus.   Neck: Neck supple. Normal carotid pulses and no JVD present. Carotid bruit is not present.   Cardiovascular: Normal rate, regular rhythm, normal heart sounds and intact distal pulses.  Exam reveals no gallop and no friction rub.  No murmur heard.  Pulmonary/Chest: Effort normal and breath sounds normal. He has no wheezes. He has no rales. He exhibits no tenderness.   Abdominal: Soft. Bowel sounds are normal. He exhibits no distension and no mass. There is no hepatosplenomegaly. There is no tenderness. There is no guarding and no CVA tenderness.   Musculoskeletal: He exhibits edema (trace ankle). He exhibits no tenderness or deformity.   Neurological: He is alert and oriented to person, place, and time.   Skin: Skin is warm and dry. Capillary refill takes less than 2 seconds. No rash noted. No erythema.   Psychiatric: He has a normal " mood and affect. His behavior is normal. Judgment and thought content normal.      Results Review:   Lab Results (last 24 hours)     Procedure Component Value Units Date/Time    POC Glucose Once [[id number]]  (Normal) Collected:  08/10/18 1109    Specimen:  Blood Updated:  08/10/18 1111     Glucose 106 mg/dL     Narrative:       Meter: [id number] : [id number] Andreas RHODES    Vitamin B12 [[id number]]  (Normal) Collected:  08/10/18 0329    Specimen:  Blood Updated:  08/10/18 0608     Vitamin B-12 345 pg/mL     Folate [[id number]]  (Normal) Collected:  08/10/18 0329    Specimen:  Blood Updated:  08/10/18 0608     Folate 8.40 ng/mL     POC Glucose Once [[id number]]  (Normal) Collected:  08/10/18 0554    Specimen:  Blood Updated:  08/10/18 0555     Glucose 89 mg/dL     Narrative:       Meter: [id number] : [id number] Jono Quevedo NA    TSH [[id number]]  (Normal) Collected:  08/10/18 0329    Specimen:  Blood Updated:  08/10/18 0454     TSH 2.450 mIU/mL     Ferritin [[id number]]  (Normal) Collected:  08/10/18 0329    Specimen:  Blood Updated:  08/10/18 0454     Ferritin 210.80 ng/mL     T4, Free [[id number]]  (Normal) Collected:  08/10/18 0329    Specimen:  Blood Updated:  08/10/18 0450     Free T4 1.05 ng/dL     Troponin [[id number]]  (Abnormal) Collected:  08/10/18 0329    Specimen:  Blood Updated:  08/10/18 0450     Troponin T 0.223 (C) ng/mL     Narrative:       Troponin T Reference Ranges:  Less than 0.03 ng/mL:    Negative for AMI  0.03 to 0.09 ng/mL:      Indeterminant for AMI  Greater than 0.09 ng/mL: Positive for AMI    BNP [[id number]]  (Normal) Collected:  08/10/18 0329    Specimen:  Blood Updated:  08/10/18 0449     proBNP 1,035.0 pg/mL     Narrative:       Among patients with dyspnea, NT-proBNP is highly sensitive for the detection of acute congestive heart failure. In addition NT-proBNP of <300 pg/ml effectively rules out acute congestive heart failure with 99% negative predictive value.    Iron Profile  [[id number]]  (Abnormal) Collected:  08/10/18 0329    Specimen:  Blood Updated:  08/10/18 0418     Iron 90 mcg/dL      Iron Saturation 41 %      Transferrin 148 (L) mg/dL      TIBC 221 mcg/dL     Lactate Dehydrogenase [[id number]]  (Normal) Collected:  08/10/18 0329    Specimen:  Blood Updated:  08/10/18 0418      U/L     Comprehensive Metabolic Panel [[id number]]  (Abnormal) Collected:  08/10/18 0329    Specimen:  Blood Updated:  08/10/18 0417     Glucose 106 (H) mg/dL      BUN 24 (H) mg/dL      Creatinine 1.25 mg/dL      Sodium 141 mmol/L      Potassium 4.0 mmol/L      Chloride 105 mmol/L      CO2 25.6 mmol/L      Calcium 8.3 (L) mg/dL      Total Protein 5.8 (L) g/dL      Albumin 3.30 (L) g/dL      ALT (SGPT) 14 U/L      AST (SGOT) 14 U/L      Alkaline Phosphatase 52 U/L      Total Bilirubin 0.4 mg/dL      eGFR Non African Amer 56 (L) mL/min/1.73      Globulin 2.5 gm/dL      A/G Ratio 1.3 g/dL      BUN/Creatinine Ratio 19.2     Anion Gap 10.4 mmol/L     Narrative:       The MDRD GFR formula is only valid for adults with stable renal function between ages 18 and 70.    Lipid Panel [[id number]]  (Abnormal) Collected:  08/10/18 0329    Specimen:  Blood Updated:  08/10/18 0417     Total Cholesterol 106 mg/dL      Triglycerides 139 mg/dL      HDL Cholesterol 27 (L) mg/dL      LDL Cholesterol  51 mg/dL      VLDL Cholesterol 27.8 mg/dL      LDL/HDL Ratio 1.90    Narrative:       Cholesterol Reference Ranges  (U.S. Department of Health and Human Services ATP III Classifications)    Desirable          <200 mg/dL  Borderline High    200-239 mg/dL  High Risk          >240 mg/dL      Triglyceride Reference Ranges  (U.S. Department of Health and Human Services ATP III Classifications)    Normal           <150 mg/dL  Borderline High  150-199 mg/dL  High             200-499 mg/dL  Very High        >500 mg/dL    HDL Reference Ranges  (U.S. Department of Health and Human Services ATP III Classifcations)    Low     <40 mg/dl  (major risk factor for CHD)  High    >60 mg/dl ('negative' risk factor for CHD)        LDL Reference Ranges  (U.S. Department of Health and Human Services ATP III Classifcations)    Optimal          <100 mg/dL  Near Optimal     100-129 mg/dL  Borderline High  130-159 mg/dL  High             160-189 mg/dL  Very High        >189 mg/dL    Magnesium [[id number]]  (Normal) Collected:  08/10/18 0329    Specimen:  Blood Updated:  08/10/18 0410     Magnesium 2.0 mg/dL     aPTT [[id number]]  (Normal) Collected:  08/10/18 0329    Specimen:  Blood Updated:  08/10/18 0402     PTT 28.6 seconds     Protime-INR [[id number]]  (Normal) Collected:  08/10/18 0329    Specimen:  Blood Updated:  08/10/18 0402     Protime 13.3 Seconds      INR 1.03    CBC & Differential [[id number]] Collected:  08/10/18 0329    Specimen:  Blood Updated:  08/10/18 0349    Narrative:       The following orders were created for panel order CBC & Differential.  Procedure                               Abnormality         Status                     ---------                               -----------         ------                     CBC Auto Differential[[id number]]        Abnormal            Final result                 Please view results for these tests on the individual orders.    CBC Auto Differential [[id number]]  (Abnormal) Collected:  08/10/18 0329    Specimen:  Blood Updated:  08/10/18 0349     WBC 2.67 (L) 10*3/mm3      RBC 3.29 (L) 10*6/mm3      Hemoglobin 9.4 (L) g/dL      Hematocrit 29.9 (L) %      MCV 90.9 fL      MCH 28.6 pg      MCHC 31.4 (L) g/dL      RDW 15.5 (H) %      RDW-SD 51.4 fl      MPV 9.6 fL      Platelets 113 (L) 10*3/mm3      Neutrophil % 55.0 %      Lymphocyte % 30.0 %      Monocyte % 7.9 %      Eosinophil % 6.7 (H) %      Basophil % 0.4 %      Immature Grans % 0.0 %      Neutrophils, Absolute 1.47 (L) 10*3/mm3      Lymphocytes, Absolute 0.80 (L) 10*3/mm3      Monocytes, Absolute 0.21 10*3/mm3      Eosinophils, Absolute 0.18 10*3/mm3       Basophils, Absolute 0.01 10*3/mm3      Immature Grans, Absolute 0.00 10*3/mm3     Protein, Urine, Random - [[id number]] Collected:  08/09/18 2100    Specimen:  Urine Updated:  08/09/18 2203     Total Protein, Urine 282.0 mg/dL     Narrative:       Reference intervals for random urine have not been established.  Clinical usage is dependent upon physician's interpretation in combination with other laboratory tests.     Creatinine, Urine, Random - Urine, Clean Catch [[id number]] Collected:  08/09/18 2100    Specimen:  Urine from Urine, Clean Catch Updated:  08/09/18 2152     Creatinine, Urine 92.3 mg/dL     Narrative:       Reference intervals for random urine have not been established.  Clinical usage is dependent upon physician's interpretation in combination with other laboratory tests.     POC Glucose Once [[id number]]  (Abnormal) Collected:  08/09/18 2037    Specimen:  Blood Updated:  08/09/18 2039     Glucose 274 (H) mg/dL     Narrative:       Meter: [id number] : [id number] Jono RHODES    POC Glucose Once [[id number]]  (Abnormal) Collected:  08/09/18 1609    Specimen:  Blood Updated:  08/09/18 1613     Glucose 217 (H) mg/dL     Narrative:       Meter: [id number] : [id number] Beck RHODES        Imaging Results (last 24 hours)     ** No results found for the last 24 hours. **           ECG 12 Lead       RR Interval= 759 ms  DE Interval= 192 ms  QRSD Interval= 166 ms  QT Interval= 428 ms  QTc Interval= 491 ms  Heart Rate= 79 ms  P Axis= 75 deg  QRS Axis= 23 deg  T Wave Axis= 195 deg  I: 40 Axis= 47 deg  T: 40 Axis= -62 deg  ST Axis= 208 deg  SINUS RHYTHM  LEFT BUNDLE BRANCH BLOCK                Current Facility-Administered Medications:   •  acetaminophen (TYLENOL) tablet 650 mg, 650 mg, Oral, Q4H PRN, Albert Ruiz MD  •  ALPRAZolam (XANAX) tablet 0.25 mg, 0.25 mg, Oral, Q8H PRN, Albert Ruiz MD, 0.25 mg at 08/08/18 2307  •  aspirin EC tablet 81 mg, 81 mg, Oral, Daily, Albert Ruiz  MD LEESA, 81 mg at 08/09/18 0843  •  atorvastatin (LIPITOR) tablet 20 mg, 20 mg, Oral, Nightly, Albert Ruiz MD, 20 mg at 08/09/18 2012  •  calcium carbonate (TUMS) chewable tablet 500 mg (200 mg elemental), 5 tablet, Oral, Daily, Albert Ruiz MD, 5 tablet at 08/09/18 1824  •  cholecalciferol (VITAMIN D3) tablet 1,000 Units, 1,000 Units, Oral, Daily, Albert Ruiz MD, 1,000 Units at 08/09/18 0842  •  dextrose (D50W) solution 25 g, 25 g, Intravenous, Q15 Min PRN, Albert Ruiz MD  •  dextrose (GLUTOSE) oral gel 15 g, 15 g, Oral, Q15 Min PRN, Albert Ruiz MD  •  diphenhydrAMINE (BENADRYL) capsule 25 mg, 25 mg, Oral, Nightly PRN, Albert Ruiz MD  •  glucagon (human recombinant) (GLUCAGEN DIAGNOSTIC) injection 1 mg, 1 mg, Subcutaneous, PRN, Albert Ruiz MD  •  heparin flush (porcine) 100 UNIT/ML injection 500 Units, 5 mL, Intravenous, PRN, Albert Ruiz MD  •  insulin aspart (novoLOG) injection 0-9 Units, 0-9 Units, Subcutaneous, 4x Daily With Meals & Nightly, Albert Ruiz MD, 6 Units at 08/09/18 2047  •  insulin detemir (LEVEMIR) injection 40 Units, 40 Units, Subcutaneous, Nightly, Uvaldo Kumari MD, 40 Units at 08/09/18 2046  •  lisinopril (PRINIVIL,ZESTRIL) tablet 40 mg, 40 mg, Oral, Q24H, Rahul Simpson MD, 40 mg at 08/10/18 0920  •  Magnesium Sulfate 2 gram infusion - Mg less than or equal to 1.5 mg/dL, 2 g, Intravenous, PRN **OR** Magesium Sulfate 1 gram infusion - Mg 1.6-1.9 mg/dL, 1 g, Intravenous, PRN, Albert Ruiz MD  •  metoprolol succinate XL (TOPROL-XL) 24 hr tablet 12.5 mg, 12.5 mg, Oral, Nightly, Albert Ruiz MD, 12.5 mg at 08/09/18 2012  •  nitroglycerin (NITROSTAT) SL tablet 0.4 mg, 0.4 mg, Sublingual, Q5 Min PRN, Albert Ruiz MD  •  pantoprazole (PROTONIX) EC tablet 40 mg, 40 mg, Oral, Q AM, Uvaldo Kumari MD, 40 mg at 08/10/18 0604  •  potassium chloride (MICRO-K) CR capsule 40 mEq, 40 mEq, Oral, PRN **OR** potassium  chloride (KLOR-CON) packet 40 mEq, 40 mEq, Oral, PRN **OR** potassium chloride 10 mEq in 100 mL IVPB, 10 mEq, Intravenous, Q1H PRN, Albert Ruiz MD  •  sodium chloride 0.9 % flush 1-10 mL, 1-10 mL, Intravenous, PRN, Albert Ruiz MD  •  sodium chloride 0.9 % flush 10 mL, 10 mL, Intravenous, Q12H, Albert Ruiz MD, 10 mL at 08/10/18 0920  •  sodium chloride 0.9 % flush 10 mL, 10 mL, Intravenous, PRN, Albert Ruiz MD  •  sodium chloride 0.9 % flush 20 mL, 20 mL, Intravenous, PRN, Albert Ruiz MD  •  temazepam (RESTORIL) capsule 7.5 mg, 7.5 mg, Oral, Nightly PRN, Albert Ruiz MD  •  [START ON 8/11/2018] vancomycin (VANCOCIN) in iso-osmotic dextrose IVPB 1 g (premix) 200 mL, 1,000 mg, Intravenous, On Call, Radha Alonzo, APRN     ASSESSMENT  Coronary artery disease  Status post non-ST elevation MI  Trachy denis syndrome   Chronic anemia and thrombocytopenia  Pancytopenia  Diabetes mellitus  Hypertension  Hyperlipidemia  Chronic kidney disease stage III  Metastatic prostate cancer    PLAN  CPM  PPM today  Transfuse PRN  Accu-Chek with sliding scale insulin  Start stress ulcer DVT prophylaxis  Continue home medication and adjust the doses  Supportive care  Will follow with Dr. Simpson further recommendation according to hospital course    NANDA BRISENO MD

## 2018-08-10 NOTE — PLAN OF CARE
Problem: Patient Care Overview  Goal: Plan of Care Review  Outcome: Ongoing (interventions implemented as appropriate)   08/10/18 0147   Coping/Psychosocial   Plan of Care Reviewed With patient   Plan of Care Review   Progress improving   OTHER   Outcome Summary VSS, blood products infused will check labs this AM, no reports of pain or discomfort, will continue to monitor.      Goal: Individualization and Mutuality  Outcome: Ongoing (interventions implemented as appropriate)      Problem: Cardiac: ACS (Acute Coronary Syndrome) (Adult)  Goal: Signs and Symptoms of Listed Potential Problems Will be Absent, Minimized or Managed (Cardiac: ACS)  Outcome: Ongoing (interventions implemented as appropriate)      Problem: Cardiac Catheterization (Diagnostic/Interventional) (Adult)  Goal: Signs and Symptoms of Listed Potential Problems Will be Absent, Minimized or Managed (Cardiac Catheterization)  Outcome: Ongoing (interventions implemented as appropriate)    Goal: Anesthesia/Sedation Recovery  Outcome: Ongoing (interventions implemented as appropriate)      Problem: Cardiac Surgery (Adult)  Goal: Signs and Symptoms of Listed Potential Problems Will be Absent, Minimized or Managed (Cardiac Surgery)  Outcome: Ongoing (interventions implemented as appropriate)    Goal: Anesthesia/Sedation Recovery  Outcome: Ongoing (interventions implemented as appropriate)

## 2018-08-10 NOTE — PROGRESS NOTES
"Ruben Milian  1940 78 y.o.  [id number]      Patient Care Team:  System, Provider Not In as PCP - Carlos Cruz MD (Cardiology)    CC: Paroxysmal A. fib, high degree heart block on sotalol therapy,2 non-ST elevation MI due to A. fib with RVR, diabetes, severe 2 vessel coronary disease, anemia, relative thrombocytopenia, metastatic prostate cancer    Interval History: Doing well overnight no chest pain no A. fib      Objective   Vital Signs  Temp:  [98 °F (36.7 °C)-99.3 °F (37.4 °C)] 98.1 °F (36.7 °C)  Heart Rate:  [73-89] 73  Resp:  [16-18] 16  BP: (121-174)/(61-92) 164/80    Intake/Output Summary (Last 24 hours) at 08/10/18 0915  Last data filed at 08/10/18 0050   Gross per 24 hour   Intake             1280 ml   Output             1000 ml   Net              280 ml     Flowsheet Rows      First Filed Value   Admission Height  182.9 cm (72\") Documented at 08/08/2018 2013   Admission Weight  102 kg (225 lb) Documented at 08/09/2018 0631          Physical Exam:   General Appearance:    Alert,oriented, in no acute distress   Lungs:     Clear to auscultation,BS are equal    Heart:    Normal S1 and S2, RRR without murmur, gallop or rub   HEENT:    Sclerae are clear, no JVD or adenopathy   Abdomen:     Normal bowel sounds, soft non-tender, non-distended, no HSM   Extremities:   Moves all extremities well, no edema, no cyanosis, no             Redness, no rash     Medication Review:        aspirin 81 mg Oral Daily   atorvastatin 20 mg Oral Nightly   calcium carbonate 5 tablet Oral Daily   cholecalciferol 1,000 Units Oral Daily   insulin aspart 0-9 Units Subcutaneous 4x Daily With Meals & Nightly   insulin detemir 40 Units Subcutaneous Nightly   lisinopril 40 mg Oral Q24H   metoprolol succinate XL 12.5 mg Oral Nightly   pantoprazole 40 mg Oral Q AM   sodium chloride 10 mL Intravenous Q12H            I reviewed the patient's new clinical results.  I personally viewed and interpreted the patient's EKG/Telemetry " data    Assessment/Plan  Active Hospital Problems    Diagnosis Date Noted   • Coronary artery disease involving native coronary artery of native heart with unstable angina pectoris (CMS/LTAC, located within [facility]) [I25.110] 08/09/2018      Resolved Hospital Problems    Diagnosis Date Noted Date Resolved   No resolved problems to display.       's complex situation I think the first issue related to deal with today is within a secure his rhythm status and place a pacemaker and then resume sotalol therapy.  Would like hematology's input on his anemia and thrombocytopenia were a little bit hesitant to place drug-eluting stents and him until this has been delineated.  I've also asked nephrology to see him about the possibility for nephrotic syndrome given his edema that he complains of an we did stop his amlodipine.    Rahul Simpson MD  08/10/18  9:15 AM

## 2018-08-10 NOTE — PROGRESS NOTES
LOS: 2 days   Patient Care Team:  System, Provider Not In as PCP - General  Carlos Ulrich MD (Cardiology)    Chief Complaint: Shortness of breath, lightheadness       Interval History:   Patient with no new compliant this morning.  Continues to have some activity intolerance. Reviewed records from [place].  Presented with complete heart block.  Reviewed echo which demonstrates mildly reduced ejection fraction. I reviewed all of this data with the patient.     ROS  Complete review of symptoms was negative      Objective   Vital Signs  Temp:  [98 °F (36.7 °C)-99.3 °F (37.4 °C)] 98.1 °F (36.7 °C)  Heart Rate:  [73-89] 73  Resp:  [16-18] 16  BP: (121-174)/(61-92) 164/80    Intake/Output Summary (Last 24 hours) at 08/10/18 1109  Last data filed at 08/10/18 0050   Gross per 24 hour   Intake             1080 ml   Output             1000 ml   Net               80 ml       Comfortable NAD  PERRL, conjunctiva clear  Neck supple, no JVD or thyromegaly appreciated  S1/S2 RRR, no m/r/g  Lungs CTA B, normal effort  Abdomen S/NT/ND (+) BS,   Extremities warm, no clubbing, cyanosis, or edema  Normal gait  No visible or palpable skin lesions  A/Ox4, mood and affect appropriate    Results Review:        Results from last 7 days  Lab Units 08/10/18  0329 08/09/18  0545  08/08/18  2303   SODIUM mmol/L 141 141  --   --    POTASSIUM mmol/L 4.0 4.6  --  4.7   CHLORIDE mmol/L 105 103  --   --    CO2 mmol/L 25.6 28.1  --   --    BUN mg/dL 24* 22  --   --    CREATININE mg/dL 1.25 1.20  --   --    GLUCOSE mg/dL 106* 159*  < >  --    CALCIUM mg/dL 8.3* 8.5*  --   --    < > = values in this interval not displayed.    Results from last 7 days  Lab Units 08/10/18  0329 08/08/18  2303   TROPONIN T ng/mL 0.223* 0.245*       Results from last 7 days  Lab Units 08/10/18  0329 08/09/18  0545   WBC 10*3/mm3 2.67* 2.36*   HEMOGLOBIN g/dL 9.4* 7.8*   HEMATOCRIT % 29.9* 25.4*   PLATELETS 10*3/mm3 113* 114*       Results from last 7 days  Lab Units  08/10/18  0329   INR  1.03   APTT seconds 28.6       Results from last 7 days  Lab Units 08/10/18  0329   CHOLESTEROL mg/dL 106       Results from last 7 days  Lab Units 08/10/18  0329   MAGNESIUM mg/dL 2.0       Results from last 7 days  Lab Units 08/10/18  0329   CHOLESTEROL mg/dL 106   TRIGLYCERIDES mg/dL 139   HDL CHOL mg/dL 27*   LDL CHOL mg/dL 51       I reviewed the patient's new clinical results.  I personally viewed and interpreted the patient's EKG/Telemetry data        Medication Review:     aspirin 81 mg Oral Daily   atorvastatin 20 mg Oral Nightly   calcium carbonate 5 tablet Oral Daily   cholecalciferol 1,000 Units Oral Daily   insulin aspart 0-9 Units Subcutaneous 4x Daily With Meals & Nightly   insulin detemir 40 Units Subcutaneous Nightly   lisinopril 40 mg Oral Q24H   metoprolol succinate XL 12.5 mg Oral Nightly   pantoprazole 40 mg Oral Q AM   sodium chloride 10 mL Intravenous Q12H   [START ON 8/11/2018] vancomycin 1,000 mg Intravenous On Call          Assessment/Plan    Tachy-Michael Syndrome  Complete Heart Block  Left Bundle Branch Block  Reduced Ejection fraction of 40%-45%  Coronary Artery Disease    I discussed with the patient options for treatment.  He is agreeable with implantation of pacemaker so that he can resume sotalol and reduce episodes of paroxsymal atrial fibrillation.  Given that he has a abnormal ejection fraction, wide left bundle, and expectation of significant pacing burden I have recommended CRT therapy.  He is agreeable with this plan.  We discussed risk including pneumothorax, infection, vascular injury and pericardial effusion and bleeding          Shan Castano MD  08/10/18  11:09 AM

## 2018-08-10 NOTE — CONSULTS
Referring Provider: Dr. Arben Simpson  Reason for Consultation: CKD3 and proteinuria    Subjective     Chief complaint No chief complaint on file.      History of present illness:  79 yo WM with CKD3 followed by Dr. Tucker Kruse of our group, admitted from [facility] for further eval of recent AMI. Had card cath at [facility] just a few days ago, and initial rec made for CABG, tho in light of prostate CA all interventions--CABG and cath--placed on hold.  Asked to see for rec's on edema, proteinuria, and CKD. His SCr today is 1.2 mg/dL.  Full PMH outlined below.  Notable is only recent addition of diuretic three weeks ago.  +CHRISTIANSON; stable wt; no orthopnea.  No urinary c/o.  No further CP, and no CP at rest.  No urinary c/o.  No F.    Past Medical History:   Diagnosis Date   • Cancer (CMS/HCC)     prostate   • Diabetes (CMS/HCC)    • H/O angioplasty    • Peptic ulcer      Past Surgical History:   Procedure Laterality Date   • AMPUTATION FINGER / THUMB     • APPENDECTOMY       Family History   Problem Relation Age of Onset   • Heart disease Mother    • Heart disease Sister      Social History   Substance Use Topics   • Smoking status: Former Smoker     Packs/day: 1.00     Types: Cigarettes     Start date: 1/1/1963     Quit date: 1/1/1975   • Smokeless tobacco: Never Used   • Alcohol use No     Prescriptions Prior to Admission   Medication Sig Dispense Refill Last Dose   • amLODIPine-benazepril (LOTREL) 10-40 MG per capsule Take 1 capsule by mouth Daily.   8/7/2018 at Unknown time   • aspirin 81 MG EC tablet Take 81 mg by mouth Daily.   8/8/2018 at Unknown time   • atorvastatin (LIPITOR) 20 MG tablet Take 20 mg by mouth Every Night.   8/7/2018 at Unknown time   • calcium carbonate (OS-BLAISE) 600 MG tablet Take 600 mg by mouth 2 (Two) Times a Day With Meals.   8/7/2018 at Unknown time   • cholecalciferol (VITAMIN D3) 1000 units tablet Take 1,000 Units by mouth Daily.   8/7/2018 at Unknown time   • glipiZIDE (GLUCOTROL) 10 MG tablet Take 10  "mg by mouth 2 (Two) Times a Day.   8/8/2018 at Unknown time   • insulin glargine (LANTUS) 100 UNIT/ML injection Inject 40 Units under the skin into the appropriate area as directed Every Night.   8/7/2018 at Unknown time   • metoprolol succinate XL (TOPROL-XL) 25 MG 24 hr tablet Take 12.5 mg by mouth Every Night.   8/8/2018 at Unknown time   • spironolactone (ALDACTONE) 25 MG tablet Take 25 mg by mouth Every Other Day.   8/8/2018 at Unknown time     Allergies:  Patient has no known allergies.    Review of Systems  14-point ROS performed and all negative except for pertinent +/-'s detailed in HPI.     Objective     Vital Signs  Temp:  [98.1 °F (36.7 °C)-99.3 °F (37.4 °C)] 98.1 °F (36.7 °C)  Heart Rate:  [73-91] 88  Resp:  [16-20] 16  BP: (120-174)/(68-92) 174/92    Flowsheet Rows      First Filed Value   Admission Height  182.9 cm (72\") Documented at 08/08/2018 2013   Admission Weight  102 kg (225 lb) Documented at 08/09/2018 0631           No intake/output data recorded.  I/O last 3 completed shifts:  In: 1480 [P.O.:1480]  Out: 1250 [Urine:1250]    Intake/Output Summary (Last 24 hours) at 08/09/18 2024  Last data filed at 08/09/18 1724   Gross per 24 hour   Intake             1480 ml   Output             1250 ml   Net              230 ml       Physical Exam:  NAD; pleasant; oriented; looks stated age  MMM; AT/NC  No eye d/c; no scleral icterus  No JVD; bilateral carotid bruits  Coarse bilat; not labored  RRR, no rub; II/VI sys murm  Soft, NT, +D, BS+  +1-2 edema  +clubbing; +Dupuytren's contractures  No asterixis  Moves all extremities   Mood and affect normal  No skin tenting  Speech fluent    Results Review:    Results from last 7 days  Lab Units 08/09/18  0545 08/08/18  2303   SODIUM mmol/L 141  --    POTASSIUM mmol/L 4.6 4.7   CHLORIDE mmol/L 103  --    CO2 mmol/L 28.1  --    BUN mg/dL 22  --    CREATININE mg/dL 1.20  --    CALCIUM mg/dL 8.5*  --    GLUCOSE mg/dL 159*  --        Estimated Creatinine Clearance: " 62.7 mL/min (by C-G formula based on SCr of 1.2 mg/dL).      Results from last 7 days  Lab Units 08/08/18  2303   MAGNESIUM mg/dL 2.0         Results from last 7 days  Lab Units 08/09/18  0545   WBC 10*3/mm3 2.36*   HEMOGLOBIN g/dL 7.8*   PLATELETS 10*3/mm3 114*             Active Medications    aspirin 81 mg Oral Daily   atorvastatin 20 mg Oral Nightly   calcium carbonate 5 tablet Oral Daily   cholecalciferol 1,000 Units Oral Daily   insulin aspart 0-9 Units Subcutaneous 4x Daily With Meals & Nightly   insulin detemir 40 Units Subcutaneous Nightly   lisinopril 40 mg Oral Q24H   metoprolol succinate XL 12.5 mg Oral Nightly   [START ON 8/10/2018] pantoprazole 40 mg Oral Q AM   sodium chloride 10 mL Intravenous Q12H          Assessment/Plan   Assessment  1.  CKD2-3: stable renal fxn and at baseline.  Peripheral vol excess, tho central vol is fine.  Lytes fine.  UA with heavy proteinuria by dipstick; quantification pending.  2.  Recent AMI  3.  Recent heart block and known pAfib  4.  Long-standing DM2 for at least 15 years  5.  Prostate CA with concern for bone mets  6.  Pancytopenia    Active Problems:    Coronary artery disease involving native coronary artery of native heart with unstable angina pectoris (CMS/HCC)      Plan  1.  Uprot:cr and TSH  2.  Agree with removal of amlodipine  3.  PRBC's  4.  Heme/Onc opinion pending  5.  Will likely begin oral loop diuretic tomorrow.  Depending on how BP responds to vol removal, will plan to titrate upward on lisinopril to maximize proteinuria reduction  6.  EP deciding on PPM    I discussed the patient's findings and my recommendations with patient and daughter at bedside    En Waller MD  08/09/18  8:24 PM

## 2018-08-11 ENCOUNTER — APPOINTMENT (OUTPATIENT)
Dept: GENERAL RADIOLOGY | Facility: HOSPITAL | Age: 78
End: 2018-08-11

## 2018-08-11 LAB
ALBUMIN SERPL-MCNC: 3.1 G/DL (ref 3.5–5.2)
ALBUMIN/GLOB SERPL: 1.2 G/DL
ALP SERPL-CCNC: 55 U/L (ref 39–117)
ALT SERPL W P-5'-P-CCNC: 18 U/L (ref 1–41)
ANION GAP SERPL CALCULATED.3IONS-SCNC: 10.6 MMOL/L
AST SERPL-CCNC: 19 U/L (ref 1–40)
BASOPHILS # BLD AUTO: 0.01 10*3/MM3 (ref 0–0.2)
BASOPHILS NFR BLD AUTO: 0.3 % (ref 0–1.5)
BILIRUB SERPL-MCNC: 0.3 MG/DL (ref 0.1–1.2)
BUN BLD-MCNC: 25 MG/DL (ref 8–23)
BUN/CREAT SERPL: 18.5 (ref 7–25)
CALCIUM SPEC-SCNC: 7.9 MG/DL (ref 8.6–10.5)
CHLORIDE SERPL-SCNC: 101 MMOL/L (ref 98–107)
CLOSE TME COLL+ADP + EPINEP PNL BLD: 93 % (ref 86–100)
CO2 SERPL-SCNC: 24.4 MMOL/L (ref 22–29)
CREAT BLD-MCNC: 1.35 MG/DL (ref 0.76–1.27)
DEPRECATED RDW RBC AUTO: 50.9 FL (ref 37–54)
EOSINOPHIL # BLD AUTO: 0.26 10*3/MM3 (ref 0–0.7)
EOSINOPHIL NFR BLD AUTO: 8.2 % (ref 0.3–6.2)
ERYTHROCYTE [DISTWIDTH] IN BLOOD BY AUTOMATED COUNT: 15.6 % (ref 11.5–14.5)
GFR SERPL CREATININE-BSD FRML MDRD: 51 ML/MIN/1.73
GLOBULIN UR ELPH-MCNC: 2.6 GM/DL
GLUCOSE BLD-MCNC: 271 MG/DL (ref 65–99)
GLUCOSE BLDC GLUCOMTR-MCNC: 203 MG/DL (ref 70–130)
GLUCOSE BLDC GLUCOMTR-MCNC: 215 MG/DL (ref 70–130)
GLUCOSE BLDC GLUCOMTR-MCNC: 284 MG/DL (ref 70–130)
HCT VFR BLD AUTO: 28.7 % (ref 40.4–52.2)
HGB BLD-MCNC: 9.7 G/DL (ref 13.7–17.6)
IMM GRANULOCYTES # BLD: 0.01 10*3/MM3 (ref 0–0.03)
IMM GRANULOCYTES NFR BLD: 0.3 % (ref 0–0.5)
LYMPHOCYTES # BLD AUTO: 0.77 10*3/MM3 (ref 0.9–4.8)
LYMPHOCYTES NFR BLD AUTO: 24.4 % (ref 19.6–45.3)
MCH RBC QN AUTO: 30.4 PG (ref 27–32.7)
MCHC RBC AUTO-ENTMCNC: 33.8 G/DL (ref 32.6–36.4)
MCV RBC AUTO: 90 FL (ref 79.8–96.2)
MONOCYTES # BLD AUTO: 0.23 10*3/MM3 (ref 0.2–1.2)
MONOCYTES NFR BLD AUTO: 7.3 % (ref 5–12)
NEUTROPHILS # BLD AUTO: 1.89 10*3/MM3 (ref 1.9–8.1)
NEUTROPHILS NFR BLD AUTO: 59.8 % (ref 42.7–76)
PHOSPHATE SERPL-MCNC: 3 MG/DL (ref 2.5–4.5)
PLATELET # BLD AUTO: 121 10*3/MM3 (ref 140–500)
PMV BLD AUTO: 9.4 FL (ref 6–12)
POTASSIUM BLD-SCNC: 4.3 MMOL/L (ref 3.5–5.2)
PROT SERPL-MCNC: 5.7 G/DL (ref 6–8.5)
RBC # BLD AUTO: 3.19 10*6/MM3 (ref 4.6–6)
SODIUM BLD-SCNC: 136 MMOL/L (ref 136–145)
WBC NRBC COR # BLD: 3.16 10*3/MM3 (ref 4.5–10.7)

## 2018-08-11 PROCEDURE — 82962 GLUCOSE BLOOD TEST: CPT

## 2018-08-11 PROCEDURE — 84100 ASSAY OF PHOSPHORUS: CPT | Performed by: INTERNAL MEDICINE

## 2018-08-11 PROCEDURE — 88189 FLOWCYTOMETRY/READ 16 & >: CPT | Performed by: INTERNAL MEDICINE

## 2018-08-11 PROCEDURE — 88184 FLOWCYTOMETRY/ TC 1 MARKER: CPT | Performed by: INTERNAL MEDICINE

## 2018-08-11 PROCEDURE — 63710000001 INSULIN ASPART PER 5 UNITS: Performed by: THORACIC SURGERY (CARDIOTHORACIC VASCULAR SURGERY)

## 2018-08-11 PROCEDURE — 71046 X-RAY EXAM CHEST 2 VIEWS: CPT

## 2018-08-11 PROCEDURE — 93010 ELECTROCARDIOGRAM REPORT: CPT | Performed by: INTERNAL MEDICINE

## 2018-08-11 PROCEDURE — 93005 ELECTROCARDIOGRAM TRACING: CPT | Performed by: NURSE PRACTITIONER

## 2018-08-11 PROCEDURE — 88185 FLOWCYTOMETRY/TC ADD-ON: CPT | Performed by: INTERNAL MEDICINE

## 2018-08-11 PROCEDURE — 80053 COMPREHEN METABOLIC PANEL: CPT | Performed by: INTERNAL MEDICINE

## 2018-08-11 PROCEDURE — 85576 BLOOD PLATELET AGGREGATION: CPT | Performed by: INTERNAL MEDICINE

## 2018-08-11 PROCEDURE — 99233 SBSQ HOSP IP/OBS HIGH 50: CPT | Performed by: INTERNAL MEDICINE

## 2018-08-11 PROCEDURE — 99232 SBSQ HOSP IP/OBS MODERATE 35: CPT | Performed by: INTERNAL MEDICINE

## 2018-08-11 PROCEDURE — 93005 ELECTROCARDIOGRAM TRACING: CPT | Performed by: INTERNAL MEDICINE

## 2018-08-11 PROCEDURE — 85025 COMPLETE CBC W/AUTO DIFF WBC: CPT | Performed by: INTERNAL MEDICINE

## 2018-08-11 PROCEDURE — 63710000001 INSULIN DETEMIR PER 5 UNITS: Performed by: HOSPITALIST

## 2018-08-11 RX ORDER — CHLORTHALIDONE 25 MG/1
25 TABLET ORAL DAILY
Status: DISCONTINUED | OUTPATIENT
Start: 2018-08-11 | End: 2018-08-13

## 2018-08-11 RX ORDER — CLOPIDOGREL BISULFATE 75 MG/1
75 TABLET ORAL DAILY
Status: DISCONTINUED | OUTPATIENT
Start: 2018-08-11 | End: 2018-08-14 | Stop reason: HOSPADM

## 2018-08-11 RX ADMIN — LISINOPRIL 40 MG: 40 TABLET ORAL at 09:37

## 2018-08-11 RX ADMIN — Medication 50 UNITS: at 22:16

## 2018-08-11 RX ADMIN — HYDROCODONE BITARTRATE AND ACETAMINOPHEN 1 TABLET: 5; 325 TABLET ORAL at 22:04

## 2018-08-11 RX ADMIN — INSULIN ASPART 4 UNITS: 100 INJECTION, SOLUTION INTRAVENOUS; SUBCUTANEOUS at 06:25

## 2018-08-11 RX ADMIN — VITAMIN D, TAB 1000IU (100/BT) 1000 UNITS: 25 TAB at 09:36

## 2018-08-11 RX ADMIN — Medication 10 ML: at 22:16

## 2018-08-11 RX ADMIN — Medication 10 ML: at 09:37

## 2018-08-11 RX ADMIN — SOTALOL HYDROCHLORIDE 80 MG: 80 TABLET ORAL at 09:37

## 2018-08-11 RX ADMIN — PANTOPRAZOLE SODIUM 40 MG: 40 TABLET, DELAYED RELEASE ORAL at 06:25

## 2018-08-11 RX ADMIN — INSULIN ASPART 4 UNITS: 100 INJECTION, SOLUTION INTRAVENOUS; SUBCUTANEOUS at 22:01

## 2018-08-11 RX ADMIN — CLOPIDOGREL 75 MG: 75 TABLET, FILM COATED ORAL at 12:43

## 2018-08-11 RX ADMIN — METOPROLOL SUCCINATE 12.5 MG: 25 TABLET, FILM COATED, EXTENDED RELEASE ORAL at 22:01

## 2018-08-11 RX ADMIN — INSULIN ASPART 6 UNITS: 100 INJECTION, SOLUTION INTRAVENOUS; SUBCUTANEOUS at 17:23

## 2018-08-11 RX ADMIN — SOTALOL HYDROCHLORIDE 80 MG: 80 TABLET ORAL at 22:01

## 2018-08-11 RX ADMIN — ASPIRIN 81 MG: 81 TABLET, DELAYED RELEASE ORAL at 09:36

## 2018-08-11 RX ADMIN — INSULIN ASPART 6 UNITS: 100 INJECTION, SOLUTION INTRAVENOUS; SUBCUTANEOUS at 11:40

## 2018-08-11 RX ADMIN — ATORVASTATIN CALCIUM 10 MG: 10 TABLET, FILM COATED ORAL at 22:01

## 2018-08-11 RX ADMIN — Medication 500 MCG: at 09:36

## 2018-08-11 RX ADMIN — CHLORTHALIDONE 25 MG: 25 TABLET ORAL at 11:40

## 2018-08-11 NOTE — PROGRESS NOTES
NEPHROLOGY PROGRESS NOTE    PATIENT IDENTIFICATION:   Name:  Ruben Milian      MRN:  [id number]     78 y.o.  male             Reason for visit: CKD2-3    SUBJECTIVE:  Feels fine; no CP overnight; no SOB    OBJECTIVE:  Vitals:    08/11/18 0000 08/11/18 0344 08/11/18 0835 08/11/18 1103   BP: 151/81 155/84 152/75 155/70   BP Location: Left arm Left arm Left arm Left arm   Patient Position: Lying  Sitting Sitting   Pulse: 79 93  64   Resp: 18 18 16 16   Temp: 98.4 °F (36.9 °C) 97.7 °F (36.5 °C) 97.8 °F (36.6 °C) 98.2 °F (36.8 °C)   TempSrc: Oral Oral Oral Oral   SpO2: 94% 93%  100%   Weight:       Height:               Body mass index is 30.52 kg/m².    Intake/Output Summary (Last 24 hours) at 08/11/18 1307  Last data filed at 08/11/18 0835   Gross per 24 hour   Intake              540 ml   Output             1100 ml   Net             -560 ml     Wt Readings from Last 1 Encounters:   08/10/18 0821 102 kg (225 lb)   08/09/18 0631 102 kg (225 lb)     Wt Readings from Last 3 Encounters:   08/10/18 102 kg (225 lb)         Exam:  NAD; pleasant; oriented; looks stated age  MMM; AT/NC  No eye d/c; no scleral icterus  No JVD; bilateral carotid bruits  Coarse bilat; not labored  RRR, no rub; II/VI sys murm  Soft, NT, +D, BS+  +1-2 edema  +clubbing; +Dupuytren's contractures  No asterixis  Moves all extremities   Mood and affect normal  No skin tenting  Speech fluent    Scheduled meds:      aspirin 81 mg Oral Daily   atorvastatin 10 mg Oral Nightly   calcium carbonate 5 tablet Oral Daily   chlorthalidone 25 mg Oral Daily   cholecalciferol 1,000 Units Oral Daily   clopidogrel 75 mg Oral Daily   insulin aspart 0-9 Units Subcutaneous 4x Daily With Meals & Nightly   insulin detemir 40 Units Subcutaneous Nightly   lisinopril 40 mg Oral Q24H   metoprolol succinate XL 12.5 mg Oral Nightly   pantoprazole 40 mg Oral Q AM   sodium chloride 10 mL Intravenous Q12H   sotalol 80 mg Oral Q12H   vitamin B-12 500 mcg Oral Daily     IV meds:                            Data Review:      Results from last 7 days  Lab Units 08/11/18  0305 08/10/18  0329 08/09/18  0545   SODIUM mmol/L 136 141 141   POTASSIUM mmol/L 4.3 4.0 4.6   CHLORIDE mmol/L 101 105 103   CO2 mmol/L 24.4 25.6 28.1   BUN mg/dL 25* 24* 22   CREATININE mg/dL 1.35* 1.25 1.20   CALCIUM mg/dL 7.9* 8.3* 8.5*   BILIRUBIN mg/dL 0.3 0.4  --    ALK PHOS U/L 55 52  --    ALT (SGPT) U/L 18 14  --    AST (SGOT) U/L 19 14  --    GLUCOSE mg/dL 271* 106* 159*     Estimated Creatinine Clearance: 55.7 mL/min (A) (by C-G formula based on SCr of 1.35 mg/dL (H)).    Results from last 7 days  Lab Units 08/09/18  2100   CREATININE UR mg/dL 92.3   PROTEIN TOTAL URINE mg/dL 282.0       Results from last 7 days  Lab Units 08/11/18  0305 08/10/18  0329 08/08/18  2303   MAGNESIUM mg/dL  --  2.0 2.0   PHOSPHORUS mg/dL 3.0  --   --          Results from last 7 days  Lab Units 08/11/18  0305 08/10/18  2010 08/10/18  0329 08/09/18  0545   WBC 10*3/mm3 3.16*  --  2.67* 2.36*   HEMOGLOBIN g/dL 9.7*  --  9.4* 7.8*   PLATELETS 10*3/mm3 121* 120* 113* 114*         Results from last 7 days  Lab Units 08/10/18  0329   INR  1.03             ASSESSMENT:   Active Problems:    Coronary artery disease involving native coronary artery of native heart with unstable angina pectoris (CMS/HCC)    1.  CKD2-3: stable renal fxn and at baseline.  Peripheral vol excess, tho central vol is fine.  Lytes fine.  UJprot:cr predicts 3 grams daily; this is likely on the basis of diabetic nephropathy.  Likely stent Monday.  Will monitor for now.  2.  Recent AMI  3.  Recent heart block and known pAfib  4.  Long-standing DM2 for at least 15 years  5.  Prostate CA with concern for bone mets  6.  Pancytopenia    Tucker L. Haulk, MD  8/11/2018    1:07 PM

## 2018-08-11 NOTE — PLAN OF CARE
Problem: Patient Care Overview  Goal: Plan of Care Review  Outcome: Ongoing (interventions implemented as appropriate)   08/11/18 1749   Coping/Psychosocial   Plan of Care Reviewed With patient   Plan of Care Review   Progress improving   OTHER   Outcome Summary Paced rhythm. Left chest PPM incision CDI with skinifex. Tender to touch. Denies offer of pain med. Up in room, tolerating activity well. Family at BS. CXR completed. Continue to monitor.     Goal: Individualization and Mutuality  Outcome: Ongoing (interventions implemented as appropriate)    Goal: Discharge Needs Assessment  Outcome: Ongoing (interventions implemented as appropriate)      Problem: Cardiac: ACS (Acute Coronary Syndrome) (Adult)  Goal: Signs and Symptoms of Listed Potential Problems Will be Absent, Minimized or Managed (Cardiac: ACS)  Outcome: Ongoing (interventions implemented as appropriate)   08/11/18 1749   Goal/Outcome Evaluation   Problems Assessed (Acute Coronary Syndrome) all   Problems Present (Acute Coronary Syn) none       Problem: Cardiac Catheterization (Diagnostic/Interventional) (Adult)  Goal: Signs and Symptoms of Listed Potential Problems Will be Absent, Minimized or Managed (Cardiac Catheterization)  Outcome: Ongoing (interventions implemented as appropriate)   08/11/18 1749   Goal/Outcome Evaluation   Problems Assessed (Cardiac Catheterization) all   Problems Present (Cardiac Cath) none       Problem: Cardiac Surgery (Adult)  Goal: Signs and Symptoms of Listed Potential Problems Will be Absent, Minimized or Managed (Cardiac Surgery)  Outcome: Ongoing (interventions implemented as appropriate)

## 2018-08-11 NOTE — PLAN OF CARE
Problem: Patient Care Overview  Goal: Plan of Care Review  Outcome: Ongoing (interventions implemented as appropriate)   08/11/18 0438   Coping/Psychosocial   Plan of Care Reviewed With patient   Plan of Care Review   Progress improving   OTHER   Outcome Summary VSS. PT had PPM placed yesterday, pacing at DDD 60. Site clean dry and intact. Pt on room air. Will continue to monitor     Goal: Individualization and Mutuality  Outcome: Ongoing (interventions implemented as appropriate)    Goal: Discharge Needs Assessment  Outcome: Ongoing (interventions implemented as appropriate)    Goal: Interprofessional Rounds/Family Conf  Outcome: Ongoing (interventions implemented as appropriate)      Problem: Cardiac: ACS (Acute Coronary Syndrome) (Adult)  Goal: Signs and Symptoms of Listed Potential Problems Will be Absent, Minimized or Managed (Cardiac: ACS)  Outcome: Ongoing (interventions implemented as appropriate)   08/11/18 0438   Goal/Outcome Evaluation   Problems Assessed (Acute Coronary Syndrome) all   Problems Present (Acute Coronary Syn) situational response       Problem: Cardiac Catheterization (Diagnostic/Interventional) (Adult)  Goal: Signs and Symptoms of Listed Potential Problems Will be Absent, Minimized or Managed (Cardiac Catheterization)  Outcome: Ongoing (interventions implemented as appropriate)   08/11/18 0430   Goal/Outcome Evaluation   Problems Assessed (Cardiac Catheterization) all   Problems Present (Cardiac Cath) none       Problem: Cardiac Surgery (Adult)  Goal: Signs and Symptoms of Listed Potential Problems Will be Absent, Minimized or Managed (Cardiac Surgery)  Outcome: Ongoing (interventions implemented as appropriate)

## 2018-08-11 NOTE — PROGRESS NOTES
"Ruben Milian  1940 78 y.o.  [id number]      Patient Care Team:  System, Provider Not In as PCP - General  Carlos Ulrich MD (Cardiology)    CC: Paroxysmal A. fib, high degree heart block on sotalol therapy,2 non-ST elevation MI due to A. fib with RVR, diabetes, severe 2 vessel coronary disease, anemia, relative thrombocytopenia, metastatic prostate cancer    Interval History: Doing well overnight      Objective   Vital Signs  Temp:  [97.7 °F (36.5 °C)-98.4 °F (36.9 °C)] 98.2 °F (36.8 °C)  Heart Rate:  [64-93] 64  Resp:  [16-20] 16  BP: (151-158)/(70-84) 155/70    Intake/Output Summary (Last 24 hours) at 08/11/18 1143  Last data filed at 08/11/18 0835   Gross per 24 hour   Intake              540 ml   Output             1100 ml   Net             -560 ml     Flowsheet Rows      First Filed Value   Admission Height  182.9 cm (72\") Documented at 08/08/2018 2013   Admission Weight  102 kg (225 lb) Documented at 08/09/2018 0631          Physical Exam:   General Appearance:    Alert,oriented, in no acute distress   Lungs:     Clear to auscultation,BS are equal    Heart:    Normal S1 and S2, RRR without murmur, gallop or rub, pacer site looks good    HEENT:    Sclerae are clear, no JVD or adenopathy   Abdomen:     Normal bowel sounds, soft non-tender, non-distended, no HSM   Extremities:   Moves all extremities well, no edema, no cyanosis, no             Redness, no rash     Medication Review:        aspirin 81 mg Oral Daily   atorvastatin 10 mg Oral Nightly   calcium carbonate 5 tablet Oral Daily   chlorthalidone 25 mg Oral Daily   cholecalciferol 1,000 Units Oral Daily   clopidogrel 75 mg Oral Daily   insulin aspart 0-9 Units Subcutaneous 4x Daily With Meals & Nightly   insulin detemir 40 Units Subcutaneous Nightly   lisinopril 40 mg Oral Q24H   metoprolol succinate XL 12.5 mg Oral Nightly   pantoprazole 40 mg Oral Q AM   sodium chloride 10 mL Intravenous Q12H   sotalol 80 mg Oral Q12H   vancomycin 1,000 mg " Intravenous On Call   vitamin B-12 500 mcg Oral Daily            I reviewed the patient's new clinical results.  I personally viewed and interpreted the patient's EKG/Telemetry data    Assessment/Plan  Active Hospital Problems    Diagnosis Date Noted   • Coronary artery disease involving native coronary artery of native heart with unstable angina pectoris (CMS/[facility]) [I25.110] 08/09/2018      Resolved Hospital Problems    Diagnosis Date Noted Date Resolved   No resolved problems to display.       Appreciate everyone's input.  He is status post biventricular pacing yesterday and now is on sotalol therapy to keep him in sinus rhythm for his sick sinus disease.  Hematology does not feel that he is actively bleeding and that he would be acceptable to to PCI on I think with his dyspnea on exertion it could be a cardiac equivalent to move forward with a PCI on him on Monday when to start Plavix today continue a baby aspirin.  His blood pressures a little high when had chlorthalidone to his regimen and see if that helps bring it down we did stop his amlodipine because of edema in his feet but that could be multifactorial from amlodipine as well as from what appears to be nephrotic syndrome  Rahul Simpson MD  08/11/18  11:43 AM

## 2018-08-11 NOTE — PROGRESS NOTES
LOS: 3 days       Chief Complaint:  Metastatic prostate cancer to bone, pancytopenia, heart block, paroxysmal atrial fibrillation, coronary artery disease        Interval History: The patient underwent placement of pacemaker yesterday without complication.  He is doing well today with no new complaints.  Fatigue is improved.  There are plans to pursue cardiac catheterization with stent placement on Monday.        Review of Systems:    Review of systems was obtained with pertinent positive findings as noted in the interval history.  All other systems negative.    Objective     Vital Signs  Temp:  [97.7 °F (36.5 °C)-98.4 °F (36.9 °C)] 97.8 °F (36.6 °C)  Heart Rate:  [79-93] 93  Resp:  [16-20] 16  BP: (151-158)/(75-84) 152/75        Physical Exam:     GENERAL:  Well-developed, well-nourished in no acute distress.   SKIN:  Warm, dry without rashes, purpura or petechiae.  MOUTH:  Tongue is well-papillated; no stomatitis or ulcers.  Lips normal.  CHEST:  Lungs clear to percussion and auscultation. Good airflow.  Pacemaker site left chest wall appears normal, no bleeding  CARDIAC:  Regular rate and rhythm without murmurs, rubs or gallops. Normal S1,S2.  ABDOMEN:  Soft, nontender with no organomegaly or masses.  EXTREMITIES: Trace bilateral lower extremity edema  NEUROLOGICAL:  Cranial Nerves II-XII grossly intact.  No focal neurological deficits.  PSYCHIATRIC:  Normal affect and mood.           Results Review:     I reviewed the patient's new clinical results.      Results from last 7 days  Lab Units 08/11/18  0305   WBC 10*3/mm3 3.16*   HEMOGLOBIN g/dL 9.7*   HEMATOCRIT % 28.7*   PLATELETS 10*3/mm3 121*     Lab Results   Component Value Date    NEUTROABS 1.89 (L) 08/11/2018       Results from last 7 days  Lab Units 08/11/18  0305   SODIUM mmol/L 136   POTASSIUM mmol/L 4.3   CHLORIDE mmol/L 101   CO2 mmol/L 24.4   BUN mg/dL 25*   CREATININE mg/dL 1.35*   GLUCOSE mg/dL 271*   CALCIUM mg/dL 7.9*       Results from last 7  days  Lab Units 08/10/18  0329   INR  1.03   APTT seconds 28.6       Results from last 7 days  Lab Units 08/10/18  0329   MAGNESIUM mg/dL 2.0     Additional labs 8/10/18: Fibrinogen 465, MAURI negative, haptoglobin 126, PSA 0.603, CRP 0.97, ESR 62, IPF 2.9%.  Labs still pending with CMV/EBV serologies, EPO level, peripheral blood flow cytometry and stool for occult blood.    Medication Review: Yes     Assessment/Plan   1. Metastatic prostate cancer to bone and pelvic lymph nodes:  · Previously followed by Dr. Coleman and subsequently followed by Dr. Alcaraz at [facility]  · Diagnosis in 2015,  on 7/28/15.  Prostate biopsy 11/6/15 with [place] score 4+5 equal to 9 disease, PSA up to 646.  · Baseline bone scan 11/2/15 with extensive skeletal metastases.  CT scan confirmed metastatic bony disease as well as pelvic lymphadenopathy.  · Treated with androgen deprivation therapy using Trelstar and also has received monthly Xgeva.  · Received chemotherapy with 6 cycles of Taxotere completed 3/4/16  · Subsequent continuation of Trelstar injection every 2 months and monthly Xgeva with PSA monitoring every 2 months and CT/bone scan every 6 months.  · Most recent PSA 5/2/18 was 0.32  · Most recent bone scan 6/11/18 with diffuse sclerotic bony lesions with no uptake consistent with treated metastatic disease.  CT scan with continued evidence of sclerotic bone disease but no other findings.  · At this point the patient appears to have continued hormone responsive well-controlled metastatic prostate cancer.  We did repeat a PSA 8/10/18 which had increased slightly to 0.603 of unclear significance.  The patient will follow-up after hospital discharge with Dr. Balwinder Alcaraz in [place] regarding potential radiographic evaluation and continued PSA monitoring.  · In regards to management of the patient's cardiac disease, he does have metastatic prostate cancer and therefore would not recommend extreme measures such as  bypass surgery however would still pursue cardiac catheterization with stent placement.  2. Leukopenia/thrombocytopenia:  · In reviewing labs from the patient's previous record at [facility], he has had a borderline platelet count in the 140-180,000 range and a white count in the 3-4000 range for some time, likely related to sclerotic change in the bones and residual effects from previous chemotherapy with producing poor marrow reserve.  · Unclear if the more precipitous drop in the patient's counts recently represents inability of his marrow to respond to stress versus another acute process.  · B12 and folate are normal however B12 is in low normal range at 345 and initiated oral B12 500 µg daily.  This is likely not a cause however of his cytopenias.  · IPF normal at 2.9% indicating reduced production.  · Additional labs still pending including peripheral blood flow cytometry, CMV and EBV serologies.  · Today, counts have improved with WBC 3.16, differential with slight eosinophilia, platelets improved at 121,000.  We will continue to monitor his platelet count however in this range, he will be able to receive antiplatelet therapy needed with cardiac stent.  3. Normocytic anemia:  · Patient appears to have a chronic anemia with hemoglobin in the 9-10 range, in part related to anemia of chronic disease/malignancy  · He does have stage III CKD creatinine 1.25 and GFR in the 50-60 range which may be a contributing factor.  EPO level pending  · Hemoglobin with significant decline on admission to 7.8, received 2 units PRBC with improvement into the 9 range.  No clinical evidence of GI blood loss  · Stool for occult blood is still pending  · No  evidence for hemolysis with normal LDH, normal AST, negative MAURI, normal haptoglobin.  · Today, hemoglobin improved at 9.7.  4. Heart block/paroxysmal atrial fibrillation:  · Pacemaker placed 8/10/18  5. Non-ST elevation MI:  · Potential cardiac catheterization with stent  placement noted for Monday  · There does not appear to be any specific contraindication to pursue stent placement and antiplatelet therapy.     Plan:  1. Additional labs still pending with erythropoietin level, peripheral blood flow cytometry, EBV/CMV serologies.  2. Stool for occult blood pending  3. Continue oral B12 500 µg daily  4. Daily CBC/differential              Myron Marcos MD  08/11/18  11:27 AM

## 2018-08-12 LAB
ANION GAP SERPL CALCULATED.3IONS-SCNC: 10.3 MMOL/L
BASOPHILS # BLD AUTO: 0.01 10*3/MM3 (ref 0–0.2)
BASOPHILS NFR BLD AUTO: 0.3 % (ref 0–1.5)
BUN BLD-MCNC: 28 MG/DL (ref 8–23)
BUN/CREAT SERPL: 18.7 (ref 7–25)
CALCIUM SPEC-SCNC: 8.1 MG/DL (ref 8.6–10.5)
CHLORIDE SERPL-SCNC: 102 MMOL/L (ref 98–107)
CO2 SERPL-SCNC: 25.7 MMOL/L (ref 22–29)
CREAT BLD-MCNC: 1.5 MG/DL (ref 0.76–1.27)
DEPRECATED RDW RBC AUTO: 50.7 FL (ref 37–54)
EOSINOPHIL # BLD AUTO: 0.33 10*3/MM3 (ref 0–0.7)
EOSINOPHIL NFR BLD AUTO: 9.3 % (ref 0.3–6.2)
ERYTHROCYTE [DISTWIDTH] IN BLOOD BY AUTOMATED COUNT: 15.7 % (ref 11.5–14.5)
GFR SERPL CREATININE-BSD FRML MDRD: 45 ML/MIN/1.73
GLUCOSE BLD-MCNC: 150 MG/DL (ref 65–99)
GLUCOSE BLDC GLUCOMTR-MCNC: 148 MG/DL (ref 70–130)
GLUCOSE BLDC GLUCOMTR-MCNC: 263 MG/DL (ref 70–130)
GLUCOSE BLDC GLUCOMTR-MCNC: 281 MG/DL (ref 70–130)
GLUCOSE BLDC GLUCOMTR-MCNC: 299 MG/DL (ref 70–130)
HCT VFR BLD AUTO: 28.8 % (ref 40.4–52.2)
HGB BLD-MCNC: 9.2 G/DL (ref 13.7–17.6)
IMM GRANULOCYTES # BLD: 0.01 10*3/MM3 (ref 0–0.03)
IMM GRANULOCYTES NFR BLD: 0.3 % (ref 0–0.5)
LYMPHOCYTES # BLD AUTO: 0.83 10*3/MM3 (ref 0.9–4.8)
LYMPHOCYTES NFR BLD AUTO: 23.4 % (ref 19.6–45.3)
MCH RBC QN AUTO: 28.8 PG (ref 27–32.7)
MCHC RBC AUTO-ENTMCNC: 31.9 G/DL (ref 32.6–36.4)
MCV RBC AUTO: 90 FL (ref 79.8–96.2)
MONOCYTES # BLD AUTO: 0.47 10*3/MM3 (ref 0.2–1.2)
MONOCYTES NFR BLD AUTO: 13.2 % (ref 5–12)
NEUTROPHILS # BLD AUTO: 1.91 10*3/MM3 (ref 1.9–8.1)
NEUTROPHILS NFR BLD AUTO: 53.8 % (ref 42.7–76)
PLATELET # BLD AUTO: 124 10*3/MM3 (ref 140–500)
PMV BLD AUTO: 9.9 FL (ref 6–12)
POTASSIUM BLD-SCNC: 4 MMOL/L (ref 3.5–5.2)
RBC # BLD AUTO: 3.2 10*6/MM3 (ref 4.6–6)
SODIUM BLD-SCNC: 138 MMOL/L (ref 136–145)
WBC NRBC COR # BLD: 3.55 10*3/MM3 (ref 4.5–10.7)

## 2018-08-12 PROCEDURE — 99232 SBSQ HOSP IP/OBS MODERATE 35: CPT | Performed by: INTERNAL MEDICINE

## 2018-08-12 PROCEDURE — 93005 ELECTROCARDIOGRAM TRACING: CPT | Performed by: INTERNAL MEDICINE

## 2018-08-12 PROCEDURE — 63710000001 INSULIN ASPART PER 5 UNITS: Performed by: THORACIC SURGERY (CARDIOTHORACIC VASCULAR SURGERY)

## 2018-08-12 PROCEDURE — 93010 ELECTROCARDIOGRAM REPORT: CPT | Performed by: INTERNAL MEDICINE

## 2018-08-12 PROCEDURE — 80048 BASIC METABOLIC PNL TOTAL CA: CPT | Performed by: HOSPITALIST

## 2018-08-12 PROCEDURE — 99231 SBSQ HOSP IP/OBS SF/LOW 25: CPT | Performed by: INTERNAL MEDICINE

## 2018-08-12 PROCEDURE — 85025 COMPLETE CBC W/AUTO DIFF WBC: CPT | Performed by: INTERNAL MEDICINE

## 2018-08-12 PROCEDURE — 82962 GLUCOSE BLOOD TEST: CPT

## 2018-08-12 PROCEDURE — 63710000001 INSULIN DETEMIR PER 5 UNITS: Performed by: HOSPITALIST

## 2018-08-12 RX ORDER — LOSARTAN POTASSIUM 50 MG/1
50 TABLET ORAL
Status: DISCONTINUED | OUTPATIENT
Start: 2018-08-12 | End: 2018-08-14 | Stop reason: HOSPADM

## 2018-08-12 RX ORDER — SODIUM CHLORIDE 9 MG/ML
100 INJECTION, SOLUTION INTRAVENOUS CONTINUOUS
Status: DISCONTINUED | OUTPATIENT
Start: 2018-08-12 | End: 2018-08-14

## 2018-08-12 RX ADMIN — SODIUM CHLORIDE 100 ML/HR: 9 INJECTION, SOLUTION INTRAVENOUS at 17:54

## 2018-08-12 RX ADMIN — Medication 55 UNITS: at 21:39

## 2018-08-12 RX ADMIN — PANTOPRAZOLE SODIUM 40 MG: 40 TABLET, DELAYED RELEASE ORAL at 04:50

## 2018-08-12 RX ADMIN — ASPIRIN 81 MG: 81 TABLET, DELAYED RELEASE ORAL at 09:12

## 2018-08-12 RX ADMIN — INSULIN ASPART 6 UNITS: 100 INJECTION, SOLUTION INTRAVENOUS; SUBCUTANEOUS at 16:29

## 2018-08-12 RX ADMIN — INSULIN ASPART 6 UNITS: 100 INJECTION, SOLUTION INTRAVENOUS; SUBCUTANEOUS at 21:00

## 2018-08-12 RX ADMIN — ATORVASTATIN CALCIUM 10 MG: 10 TABLET, FILM COATED ORAL at 21:39

## 2018-08-12 RX ADMIN — CHLORTHALIDONE 25 MG: 25 TABLET ORAL at 09:12

## 2018-08-12 RX ADMIN — VITAMIN D, TAB 1000IU (100/BT) 1000 UNITS: 25 TAB at 09:12

## 2018-08-12 RX ADMIN — Medication 10 ML: at 09:12

## 2018-08-12 RX ADMIN — LISINOPRIL 40 MG: 40 TABLET ORAL at 09:12

## 2018-08-12 RX ADMIN — LOSARTAN POTASSIUM 50 MG: 50 TABLET, FILM COATED ORAL at 22:56

## 2018-08-12 RX ADMIN — TEMAZEPAM 7.5 MG: 7.5 CAPSULE ORAL at 21:39

## 2018-08-12 RX ADMIN — Medication 500 MCG: at 09:12

## 2018-08-12 RX ADMIN — INSULIN ASPART 6 UNITS: 100 INJECTION, SOLUTION INTRAVENOUS; SUBCUTANEOUS at 11:09

## 2018-08-12 RX ADMIN — METOPROLOL SUCCINATE 12.5 MG: 25 TABLET, FILM COATED, EXTENDED RELEASE ORAL at 21:39

## 2018-08-12 RX ADMIN — SOTALOL HYDROCHLORIDE 80 MG: 80 TABLET ORAL at 09:12

## 2018-08-12 RX ADMIN — SOTALOL HYDROCHLORIDE 80 MG: 80 TABLET ORAL at 21:41

## 2018-08-12 RX ADMIN — CLOPIDOGREL 75 MG: 75 TABLET, FILM COATED ORAL at 09:12

## 2018-08-12 NOTE — PROGRESS NOTES
"Daily progress note    Complaining  Doing same  Denies chest pain or any shortness of breath    History of present illness  78-year-old white male with history of prostate cancer status post chemotherapy other medical problem diabetes mellitus hypertension peptic ulcer disease admitted to [facility] with unstable angina and severe bradycardia and underwent cardiac catheterization which showed severe coronary artery disease transferred to [facility] for further management.  I'm asked to follow the patient for medical problems.  At the time of interview his denies any chest pain shortness of breath but is still very weak also denies any fever chills cough nausea vomiting.  Patient has no palpitation at the time of interview.     Physical Exam    Blood pressure 156/76, pulse 60, temperature 97.7 °F (36.5 °C), temperature source Oral, resp. rate 18, height 177.8 cm (70\"), weight 87.1 kg (192 lb), SpO2 95 %.    Constitutional: He is oriented to person, place, and time. He appears well-developed and well-nourished. He is cooperative.   Eyes: No scleral icterus.   Neck: Neck supple. Normal carotid pulses and no JVD present. Carotid bruit is not present.   Cardiovascular: Normal rate, regular rhythm, normal heart sounds and intact distal pulses.  Exam reveals no gallop and no friction rub.  No murmur heard.  Pulmonary/Chest: Effort normal and breath sounds normal. He has no wheezes. He has no rales. He exhibits no tenderness.   Abdominal: Soft. Bowel sounds are normal. He exhibits no distension and no mass. There is no hepatosplenomegaly. There is no tenderness. There is no guarding and no CVA tenderness.   Musculoskeletal: He exhibits edema (trace ankle). He exhibits no tenderness or deformity.   Neurological: He is alert and oriented to person, place, and time.   Skin: Skin is warm and dry. Capillary refill takes less than 2 seconds. No rash noted. No erythema.   Psychiatric: He has a normal mood " and affect. His behavior is normal. Judgment and thought content normal.      Results Review:   Lab Results (last 24 hours)     Procedure Component Value Units Date/Time    POC Glucose Once [[id number]]  (Abnormal) Collected:  08/12/18 1039    Specimen:  Blood Updated:  08/12/18 1045     Glucose 263 (H) mg/dL     Narrative:       Meter: [id number] : [id number] [employer] Cheyenne FREDERICK    POC Glucose Once [[id number]]  (Abnormal) Collected:  08/12/18 0716    Specimen:  Blood Updated:  08/12/18 0718     Glucose 148 (H) mg/dL     Narrative:       Meter: [id number] : [id number] [employer] Cheyenne FREDERICK    Basic Metabolic Panel [[id number]]  (Abnormal) Collected:  08/12/18 0404    Specimen:  Blood Updated:  08/12/18 0450     Glucose 150 (H) mg/dL      BUN 28 (H) mg/dL      Creatinine 1.50 (H) mg/dL      Sodium 138 mmol/L      Potassium 4.0 mmol/L      Chloride 102 mmol/L      CO2 25.7 mmol/L      Calcium 8.1 (L) mg/dL      eGFR Non African Amer 45 (L) mL/min/1.73      BUN/Creatinine Ratio 18.7     Anion Gap 10.3 mmol/L     Narrative:       The MDRD GFR formula is only valid for adults with stable renal function between ages 18 and 70.    CBC & Differential [[id number]] Collected:  08/12/18 0404    Specimen:  Blood Updated:  08/12/18 0427    Narrative:       The following orders were created for panel order CBC & Differential.  Procedure                               Abnormality         Status                     ---------                               -----------         ------                     CBC Auto Differential[[id number]]        Abnormal            Final result                 Please view results for these tests on the individual orders.    CBC Auto Differential [[id number]]  (Abnormal) Collected:  08/12/18 0404    Specimen:  Blood Updated:  08/12/18 0427     WBC 3.55 (L) 10*3/mm3      RBC 3.20 (L) 10*6/mm3      Hemoglobin 9.2 (L) g/dL      Hematocrit 28.8 (L) %      MCV 90.0 fL      MCH 28.8 pg      MCHC 31.9 (L) g/dL       RDW 15.7 (H) %      RDW-SD 50.7 fl      MPV 9.9 fL      Platelets 124 (L) 10*3/mm3      Neutrophil % 53.8 %      Lymphocyte % 23.4 %      Monocyte % 13.2 (H) %      Eosinophil % 9.3 (H) %      Basophil % 0.3 %      Immature Grans % 0.3 %      Neutrophils, Absolute 1.91 10*3/mm3      Lymphocytes, Absolute 0.83 (L) 10*3/mm3      Monocytes, Absolute 0.47 10*3/mm3      Eosinophils, Absolute 0.33 10*3/mm3      Basophils, Absolute 0.01 10*3/mm3      Immature Grans, Absolute 0.01 10*3/mm3     POC Glucose Once [[id number]]  (Abnormal) Collected:  08/11/18 2104    Specimen:  Blood Updated:  08/11/18 2105     Glucose 215 (H) mg/dL     Narrative:       Meter: [id number] : [id number] Walt RHODES        Imaging Results (last 24 hours)     ** No results found for the last 24 hours. **           ECG 12 Lead       RR Interval= 759 ms  IL Interval= 192 ms  QRSD Interval= 166 ms  QT Interval= 428 ms  QTc Interval= 491 ms  Heart Rate= 79 ms  P Axis= 75 deg  QRS Axis= 23 deg  T Wave Axis= 195 deg  I: 40 Axis= 47 deg  T: 40 Axis= -62 deg  ST Axis= 208 deg  SINUS RHYTHM  LEFT BUNDLE BRANCH BLOCK                Current Facility-Administered Medications:   •  acetaminophen (TYLENOL) tablet 650 mg, 650 mg, Oral, Q4H PRN **OR** acetaminophen (TYLENOL) 160 MG/5ML solution 650 mg, 650 mg, Oral, Q4H PRN **OR** acetaminophen (TYLENOL) suppository 650 mg, 650 mg, Rectal, Q4H PRN, Radha Alonzo APRN  •  acetaminophen (TYLENOL) tablet 650 mg, 650 mg, Oral, Q4H PRN, Albert Ruiz MD  •  ALPRAZolam (XANAX) tablet 0.25 mg, 0.25 mg, Oral, Q8H PRN, Albert Ruiz MD, 0.25 mg at 08/08/18 2307  •  aspirin EC tablet 81 mg, 81 mg, Oral, Daily, Albert Ruiz MD, 81 mg at 08/12/18 0912  •  atorvastatin (LIPITOR) tablet 10 mg, 10 mg, Oral, Nightly, Uvaldo Kumari MD, 10 mg at 08/11/18 2201  •  calcium carbonate (TUMS) chewable tablet 500 mg (200 mg elemental), 5 tablet, Oral, Daily, Albert Ruiz MD, 5 tablet at 08/09/18  1824  •  chlorthalidone (HYGROTON) tablet 25 mg, 25 mg, Oral, Daily, Rahul Simpson MD, 25 mg at 08/12/18 0912  •  cholecalciferol (VITAMIN D3) tablet 1,000 Units, 1,000 Units, Oral, Daily, Albert Ruiz MD, 1,000 Units at 08/12/18 0912  •  clopidogrel (PLAVIX) tablet 75 mg, 75 mg, Oral, Daily, Rahul Simpson MD, 75 mg at 08/12/18 0912  •  dextrose (D50W) solution 25 g, 25 g, Intravenous, Q15 Min PRN, Albert Ruiz MD  •  dextrose (GLUTOSE) oral gel 15 g, 15 g, Oral, Q15 Min PRN, Albert Ruiz MD  •  diphenhydrAMINE (BENADRYL) capsule 25 mg, 25 mg, Oral, Nightly PRN, Albert Ruiz MD  •  glucagon (human recombinant) (GLUCAGEN DIAGNOSTIC) injection 1 mg, 1 mg, Subcutaneous, PRN, Albert Ruiz MD  •  heparin flush (porcine) 100 UNIT/ML injection 500 Units, 5 mL, Intravenous, PRN, Albert Ruiz MD  •  HYDROcodone-acetaminophen (NORCO)  MG per tablet 1 tablet, 1 tablet, Oral, Q4H PRN, Clinton Radha A, APRN  •  HYDROcodone-acetaminophen (NORCO) 5-325 MG per tablet 1 tablet, 1 tablet, Oral, Q4H PRN, Thiago Alonzoky A, APRN, 1 tablet at 08/11/18 2204  •  HYDROmorphone (DILAUDID) injection 0.5 mg, 0.5 mg, Intravenous, Q2H PRN **AND** naloxone (NARCAN) injection 0.4 mg, 0.4 mg, Intravenous, Q5 Min PRN, Radha Alonzo A, APRN  •  insulin aspart (novoLOG) injection 0-9 Units, 0-9 Units, Subcutaneous, 4x Daily With Meals & Nightly, Albert Ruiz MD, 6 Units at 08/12/18 1109  •  insulin detemir (LEVEMIR) injection 50 Units, 50 Units, Subcutaneous, Nightly, Uvaldo Kumari MD, 50 Units at 08/11/18 2216  •  lisinopril (PRINIVIL,ZESTRIL) tablet 40 mg, 40 mg, Oral, Q24H, Rahul Simpson MD, 40 mg at 08/12/18 0912  •  Magnesium Sulfate 2 gram infusion - Mg less than or equal to 1.5 mg/dL, 2 g, Intravenous, PRN **OR** Magesium Sulfate 1 gram infusion - Mg 1.6-1.9 mg/dL, 1 g, Intravenous, PRN, Albert Ruiz MD  •  metoprolol succinate XL (TOPROL-XL) 24 hr tablet 12.5 mg, 12.5 mg, Oral,  Nightly, Albert Ruiz MD, 12.5 mg at 08/11/18 2201  •  ondansetron (ZOFRAN) injection 4 mg, 4 mg, Intravenous, Q6H PRN, Radha Alonzo, APRN  •  pantoprazole (PROTONIX) EC tablet 40 mg, 40 mg, Oral, Q AM, Nanda Kumari MD, 40 mg at 08/12/18 0450  •  potassium chloride (MICRO-K) CR capsule 40 mEq, 40 mEq, Oral, PRN **OR** potassium chloride (KLOR-CON) packet 40 mEq, 40 mEq, Oral, PRN **OR** potassium chloride 10 mEq in 100 mL IVPB, 10 mEq, Intravenous, Q1H PRN, Albert Ruiz MD  •  sodium chloride 0.9 % flush 1-10 mL, 1-10 mL, Intravenous, PRN, Radha Alonzo, APRN  •  sodium chloride 0.9 % flush 10 mL, 10 mL, Intravenous, Q12H, Albert Ruiz MD, 10 mL at 08/12/18 0912  •  sodium chloride 0.9 % flush 10 mL, 10 mL, Intravenous, PRN, Albert Ruiz MD  •  sodium chloride 0.9 % flush 20 mL, 20 mL, Intravenous, PRN, Albert Ruiz MD  •  sotalol (BETAPACE) tablet 80 mg, 80 mg, Oral, Q12H, Radha Alonzo, APRN, 80 mg at 08/12/18 0912  •  temazepam (RESTORIL) capsule 7.5 mg, 7.5 mg, Oral, Nightly PRN, Albert Ruiz MD  •  vitamin B-12 (CYANOCOBALAMIN) tablet 500 mcg, 500 mcg, Oral, Daily, Myron Marcos Jr., MD, 500 mcg at 08/12/18 0912     ASSESSMENT  Coronary artery disease  Status post non-ST elevation MI  Trachy denis syndrome   Chronic anemia and thrombocytopenia  Pancytopenia  Diabetes mellitus  Hypertension  Hyperlipidemia  Chronic kidney disease stage III  Metastatic prostate cancer    PLAN  CPM  Stent placement IN AM  Transfuse PRN  Accu-Chek with sliding scale insulin  Adjust insulin dose  Start stress ulcer DVT prophylaxis  Continue home medication and adjust the doses  Supportive care  Will follow with Dr. Simpson further recommendation according to hospital course    NANDA KUMARI MD

## 2018-08-12 NOTE — PLAN OF CARE
Problem: Patient Care Overview  Goal: Plan of Care Review  Outcome: Ongoing (interventions implemented as appropriate)   08/12/18 0146   Coping/Psychosocial   Plan of Care Reviewed With patient   Plan of Care Review   Progress no change   OTHER   Outcome Summary VSS. Pt remains paced on the monitor. LITZY c/d/i, no c/o pain. Plan for high risk PCI on Monday. Will continue to monitor closely.

## 2018-08-12 NOTE — PROGRESS NOTES
NEPHROLOGY PROGRESS NOTE    PATIENT IDENTIFICATION:   Name:  Ruben Milian      MRN:  [id number]     78 y.o.  male             Reason for visit: CKD2-3    SUBJECTIVE:  Feels fine; no CP overnight; no SOB.  Has had dry cough.    OBJECTIVE:  Vitals:    08/12/18 0830 08/12/18 0912 08/12/18 1151 08/12/18 1617   BP: 128/64  156/76 159/84   BP Location: Left arm  Left arm Left arm   Patient Position: Sitting  Sitting Sitting   Pulse:  62 60 60   Resp: 16 18 16   Temp: 98.2 °F (36.8 °C)  97.7 °F (36.5 °C) 98 °F (36.7 °C)   TempSrc: Oral  Oral Oral   SpO2: 95%   97%   Weight:       Height:               Body mass index is 27.55 kg/m².    Intake/Output Summary (Last 24 hours) at 08/12/18 1755  Last data filed at 08/12/18 1617   Gross per 24 hour   Intake             1440 ml   Output             1350 ml   Net               90 ml     Wt Readings from Last 1 Encounters:   08/11/18 2041 87.1 kg (192 lb)   08/10/18 0821 102 kg (225 lb)   08/09/18 0631 102 kg (225 lb)     Wt Readings from Last 3 Encounters:   08/11/18 87.1 kg (192 lb)         Exam:  NAD; pleasant; oriented; looks stated age  MMM; AT/NC  No eye d/c; no scleral icterus  No JVD; bilateral carotid bruits  Coarse bilat; not labored  RRR, no rub; II/VI sys murm  Soft, NT, +D, BS+  trace edema   +Dupuytren's contractures  Moves all extremities   Mood and affect normal  No skin tenting  Speech fluent    Scheduled meds:      aspirin 81 mg Oral Daily   atorvastatin 10 mg Oral Nightly   calcium carbonate 5 tablet Oral Daily   chlorthalidone 25 mg Oral Daily   cholecalciferol 1,000 Units Oral Daily   clopidogrel 75 mg Oral Daily   insulin aspart 0-9 Units Subcutaneous 4x Daily With Meals & Nightly   insulin detemir 55 Units Subcutaneous Nightly   lisinopril 40 mg Oral Q24H   metoprolol succinate XL 12.5 mg Oral Nightly   pantoprazole 40 mg Oral Q AM   sodium chloride 10 mL Intravenous Q12H   sotalol 80 mg Oral Q12H   vitamin B-12 500 mcg Oral Daily     IV meds:                           sodium chloride 100 mL/hr Last Rate: 100 mL/hr (08/12/18 1754)       Data Review:      Results from last 7 days  Lab Units 08/12/18  0404 08/11/18  0305 08/10/18  0329   SODIUM mmol/L 138 136 141   POTASSIUM mmol/L 4.0 4.3 4.0   CHLORIDE mmol/L 102 101 105   CO2 mmol/L 25.7 24.4 25.6   BUN mg/dL 28* 25* 24*   CREATININE mg/dL 1.50* 1.35* 1.25   CALCIUM mg/dL 8.1* 7.9* 8.3*   BILIRUBIN mg/dL  --  0.3 0.4   ALK PHOS U/L  --  55 52   ALT (SGPT) U/L  --  18 14   AST (SGOT) U/L  --  19 14   GLUCOSE mg/dL 150* 271* 106*     Estimated Creatinine Clearance: 50 mL/min (A) (by C-G formula based on SCr of 1.5 mg/dL (H)).    Results from last 7 days  Lab Units 08/09/18  2100   CREATININE UR mg/dL 92.3   PROTEIN TOTAL URINE mg/dL 282.0       Results from last 7 days  Lab Units 08/11/18  0305 08/10/18  0329 08/08/18  2303   MAGNESIUM mg/dL  --  2.0 2.0   PHOSPHORUS mg/dL 3.0  --   --          Results from last 7 days  Lab Units 08/12/18  0404 08/11/18  0305 08/10/18  2010 08/10/18  0329 08/09/18  0545   WBC 10*3/mm3 3.55* 3.16*  --  2.67* 2.36*   HEMOGLOBIN g/dL 9.2* 9.7*  --  9.4* 7.8*   PLATELETS 10*3/mm3 124* 121* 120* 113* 114*         Results from last 7 days  Lab Units 08/10/18  0329   INR  1.03             ASSESSMENT:   Active Problems:    Coronary artery disease involving native coronary artery of native heart with unstable angina pectoris (CMS/HCC)    1.  CKD2-3: stable renal fxn and at baseline.  Peripheral vol excess, tho central vol is fine.  Lytes fine.  U/prot:cr predicts 3 grams daily; this is likely on the basis of diabetic nephropathy.  Likely stent Monday.  Will monitor for now.   2.  Recent AMI  3.  Recent heart block and known pAfib  4.  Long-standing DM2 for at least 15 years  5.  Prostate CA with concern for bone mets  6.  Pancytopenia  7. Cough-  ? Related to acei.  Changing to ARB now.    Tucker Kruse MD  8/12/2018    5:55 PM

## 2018-08-12 NOTE — PLAN OF CARE
Problem: Patient Care Overview  Goal: Plan of Care Review  Outcome: Ongoing (interventions implemented as appropriate)   08/12/18 2158   Coping/Psychosocial   Plan of Care Reviewed With patient;family   Plan of Care Review   Progress improving   OTHER   Outcome Summary Pacemaker incision left chest CDI. Denies pain. Paced rhythm. Denies SOA. C/O dry cough, cough lozenge ordered. Cardiac cath scheduled for tomorrow morning, consents signed.. Continue to monitor     Goal: Individualization and Mutuality  Outcome: Ongoing (interventions implemented as appropriate)    Goal: Discharge Needs Assessment  Outcome: Ongoing (interventions implemented as appropriate)      Problem: Cardiac: ACS (Acute Coronary Syndrome) (Adult)  Goal: Signs and Symptoms of Listed Potential Problems Will be Absent, Minimized or Managed (Cardiac: ACS)  Outcome: Ongoing (interventions implemented as appropriate)      Problem: Cardiac Catheterization (Diagnostic/Interventional) (Adult)  Goal: Signs and Symptoms of Listed Potential Problems Will be Absent, Minimized or Managed (Cardiac Catheterization)  Outcome: Ongoing (interventions implemented as appropriate)

## 2018-08-12 NOTE — PROGRESS NOTES
"Ruben Milian  1940 78 y.o.  [id number]      Patient Care Team:  System, Provider Not In as PCP - General  Carlos Ulrich MD (Cardiology)    CC: Paroxysmal A. fib, high degree heart block on sotalol therapy,2 non-ST elevation MI due to A. fib with RVR, diabetes, severe 2 vessel coronary disease, anemia, relative thrombocytopenia, metastatic prostate cancer    Interval History: Doing well       Objective   Vital Signs  Temp:  [97.7 °F (36.5 °C)-98.4 °F (36.9 °C)] 97.7 °F (36.5 °C)  Heart Rate:  [] 60  Resp:  [16-18] 18  BP: (128-169)/(64-92) 156/76    Intake/Output Summary (Last 24 hours) at 08/12/18 1420  Last data filed at 08/12/18 1153   Gross per 24 hour   Intake             1200 ml   Output              900 ml   Net              300 ml     Flowsheet Rows      First Filed Value   Admission Height  182.9 cm (72\") Documented at 08/08/2018 2013   Admission Weight  102 kg (225 lb) Documented at 08/09/2018 0631          Physical Exam:   General Appearance:    Alert,oriented, in no acute distress   Lungs:     Clear to auscultation,BS are equal    Heart:    Normal S1 and S2, RRR without murmur, gallop or rub, pacer site looks good    HEENT:    Sclerae are clear, no JVD or adenopathy   Abdomen:     Normal bowel sounds, soft non-tender, non-distended, no HSM   Extremities:   Moves all extremities well, no edema, no cyanosis, no             Redness, no rash     Medication Review:        aspirin 81 mg Oral Daily   atorvastatin 10 mg Oral Nightly   calcium carbonate 5 tablet Oral Daily   chlorthalidone 25 mg Oral Daily   cholecalciferol 1,000 Units Oral Daily   clopidogrel 75 mg Oral Daily   insulin aspart 0-9 Units Subcutaneous 4x Daily With Meals & Nightly   insulin detemir 55 Units Subcutaneous Nightly   lisinopril 40 mg Oral Q24H   metoprolol succinate XL 12.5 mg Oral Nightly   pantoprazole 40 mg Oral Q AM   sodium chloride 10 mL Intravenous Q12H   sotalol 80 mg Oral Q12H   vitamin B-12 500 mcg Oral Daily      "       I reviewed the patient's new clinical results.  I personally viewed and interpreted the patient's EKG/Telemetry data    Assessment/Plan  Active Hospital Problems    Diagnosis Date Noted   • Coronary artery disease involving native coronary artery of native heart with unstable angina pectoris (CMS/[facility]) [I25.110] 08/09/2018      Resolved Hospital Problems    Diagnosis Date Noted Date Resolved   No resolved problems to display.       Appreciate everyone's input.  He is status post biventricular pacing yesterday and now is on sotalol therapy to keep him in sinus rhythm for his sick sinus disease.  Hematology does not feel that he is actively bleeding and that he would be acceptable to to PCI on I think with his dyspnea on exertion it could be a cardiac equivalent to move forward with a PCI on him on Monday when to start Plavix today continue a baby aspirin.  His blood pressures a little high when had chlorthalidone to his regimen and see if that helps bring it down we did stop his amlodipine because of edema in his feet but that could be multifactorial from amlodipine as well as from what appears to be nephrotic syndrome    We are going to push on with PCI tomorrow morning otherwise doing well possibly home on Tuesday  Rahul Simpson MD  08/12/18  2:20 PM

## 2018-08-12 NOTE — PROGRESS NOTES
LOS: 4 days       Chief Complaint:  Metastatic prostate cancer to bone, pancytopenia, heart block, paroxysmal atrial fibrillation, coronary artery disease        Interval History: The patient has no new complaints today.  He is prepared for cardiac catheterization with stent placement tomorrow.  He was started on Plavix yesterday.        Review of Systems:    Review of systems was obtained with pertinent positive findings as noted in the interval history.  All other systems negative.    Objective     Vital Signs  Temp:  [97.7 °F (36.5 °C)-98.4 °F (36.9 °C)] 98 °F (36.7 °C)  Heart Rate:  [] 60  Resp:  [16-18] 16  BP: (128-169)/(64-92) 159/84        Physical Exam:     GENERAL:  Well-developed, well-nourished in no acute distress.   SKIN:  Warm, dry without rashes, purpura or petechiae.  MOUTH:  Tongue is well-papillated; no stomatitis or ulcers.  Lips normal.  CHEST:  Lungs clear to percussion and auscultation. Good airflow.  Pacemaker site left chest wall appears normal, no bleeding  CARDIAC:  Regular rate and rhythm without murmurs, rubs or gallops. Normal S1,S2.  ABDOMEN:  Soft, nontender with no organomegaly or masses.  EXTREMITIES: Trace bilateral lower extremity edema  NEUROLOGICAL:  Cranial Nerves II-XII grossly intact.  No focal neurological deficits.  PSYCHIATRIC:  Normal affect and mood.    I did examine the patient today, exam unchanged as noted above       Results Review:     I reviewed the patient's new clinical results.      Results from last 7 days  Lab Units 08/12/18  0404   WBC 10*3/mm3 3.55*   HEMOGLOBIN g/dL 9.2*   HEMATOCRIT % 28.8*   PLATELETS 10*3/mm3 124*     Lab Results   Component Value Date    NEUTROABS 1.91 08/12/2018       Results from last 7 days  Lab Units 08/12/18  0404   SODIUM mmol/L 138   POTASSIUM mmol/L 4.0   CHLORIDE mmol/L 102   CO2 mmol/L 25.7   BUN mg/dL 28*   CREATININE mg/dL 1.50*   GLUCOSE mg/dL 150*   CALCIUM mg/dL 8.1*       Results from last 7 days  Lab Units  08/10/18  0329   INR  1.03   APTT seconds 28.6       Results from last 7 days  Lab Units 08/10/18  0329   MAGNESIUM mg/dL 2.0     Additional labs 8/10/18: Fibrinogen 465, MAURI negative, haptoglobin 126, PSA 0.603, CRP 0.97, ESR 62, IPF 2.9%.  Labs still pending with CMV/EBV serologies, EPO level, peripheral blood flow cytometry and stool for occult blood.    Medication Review: Yes     Assessment/Plan   1. Metastatic prostate cancer to bone and pelvic lymph nodes:  · Previously followed by Dr. Coleman and subsequently followed by Dr. Alcaraz at [facility]  · Diagnosis in 2015,  on 7/28/15.  Prostate biopsy 11/6/15 with [place] score 4+5 equal to 9 disease, PSA up to 646.  · Baseline bone scan 11/2/15 with extensive skeletal metastases.  CT scan confirmed metastatic bony disease as well as pelvic lymphadenopathy.  · Treated with androgen deprivation therapy using Trelstar and also has received monthly Xgeva.  · Received chemotherapy with 6 cycles of Taxotere completed 3/4/16  · Subsequent continuation of Trelstar injection every 2 months and monthly Xgeva with PSA monitoring every 2 months and CT/bone scan every 6 months.  · Most recent PSA 5/2/18 was 0.32  · Most recent bone scan 6/11/18 with diffuse sclerotic bony lesions with no uptake consistent with treated metastatic disease.  CT scan with continued evidence of sclerotic bone disease but no other findings.  · At this point the patient appears to have continued hormone responsive well-controlled metastatic prostate cancer.  We did repeat a PSA 8/10/18 which had increased slightly to 0.603 of unclear significance.  The patient will follow-up after hospital discharge with Dr. Balwinder Alcaraz in [place] regarding potential radiographic evaluation and continued PSA monitoring.  · In regards to management of the patient's cardiac disease, he does have metastatic prostate cancer and therefore would not recommend extreme measures such as bypass surgery  however would still pursue cardiac catheterization with stent placement.  Patient is scheduled for procedure in a.m.  2. Leukopenia/thrombocytopenia:  · In reviewing labs from the patient's previous record at [facility], he has had a borderline platelet count in the 140-180,000 range and a white count in the 3-4000 range for some time, likely related to sclerotic change in the bones and residual effects from previous chemotherapy with producing poor marrow reserve.  · Unclear if the more precipitous drop in the patient's counts recently represents inability of his marrow to respond to stress versus another acute process.  · B12 and folate are normal however B12 is in low normal range at 345 and initiated oral B12 500 µg daily.  This is likely not a cause however of his cytopenias.  · IPF normal at 2.9% indicating reduced production.  · Additional labs still pending including peripheral blood flow cytometry, CMV and EBV serologies.  · Counts continued to improved today with WBC up to 3.55, platelet count 124,000.  Patient initiated Plavix yesterday in addition to aspirin 81 mg daily.  3. Normocytic anemia:  · Patient appears to have a chronic anemia with hemoglobin in the 9-10 range, in part related to anemia of chronic disease/malignancy  · He does have stage III CKD creatinine 1.25 and GFR in the 50-60 range which may be a contributing factor.  EPO level pending  · Hemoglobin with significant decline on admission to 7.8, received 2 units PRBC with improvement into the 9 range.  No clinical evidence of GI blood loss  · Stool for occult blood is still pending  · No  evidence for hemolysis with normal LDH, normal AST, negative MAURI, normal haptoglobin.  · Today, hemoglobin relatively stable at 9.2.  4. Heart block/paroxysmal atrial fibrillation:  · Pacemaker placed 8/10/18  5. Non-ST elevation MI:  · Potential cardiac catheterization with stent placement noted for Monday  · There does not appear to be any specific  contraindication to pursue stent placement and antiplatelet therapy, patient initiated Plavix 75 mg daily on 8/11/18 in conjunction with aspirin 81 mg daily.     Plan:  1. Additional labs still pending with erythropoietin level, peripheral blood flow cytometry, EBV/CMV serologies.  2. Stool for occult blood pending  3. Continue oral B12 500 µg daily  4. Patient proceeding tomorrow as planned with cardiac catheterization and stent placement.  Continuing currently on Plavix 75 mg daily and aspirin 81 mg daily.  5. Daily CBC/differential              Myron Marcos MD  08/12/18  5:19 PM

## 2018-08-13 LAB
ACT BLD: 340 SECONDS (ref 82–152)
ANION GAP SERPL CALCULATED.3IONS-SCNC: 11.4 MMOL/L
BASOPHILS # BLD AUTO: 0.01 10*3/MM3 (ref 0–0.2)
BASOPHILS NFR BLD AUTO: 0.3 % (ref 0–1.5)
BUN BLD-MCNC: 31 MG/DL (ref 8–23)
BUN/CREAT SERPL: 25 (ref 7–25)
CALCIUM SPEC-SCNC: 7.7 MG/DL (ref 8.6–10.5)
CHLORIDE SERPL-SCNC: 102 MMOL/L (ref 98–107)
CMV IGG SERPL IA-ACNC: 8.2 U/ML (ref 0–0.59)
CMV IGM SERPL IA-ACNC: <30 AU/ML (ref 0–29.9)
CO2 SERPL-SCNC: 24.6 MMOL/L (ref 22–29)
CREAT BLD-MCNC: 1.24 MG/DL (ref 0.76–1.27)
DEPRECATED RDW RBC AUTO: 50.1 FL (ref 37–54)
EBV EA IGG SER-ACNC: <9 U/ML (ref 0–8.9)
EBV NA IGG SER IA-ACNC: 416 U/ML (ref 0–17.9)
EBV VCA IGG SER-ACNC: >600 U/ML (ref 0–17.9)
EBV VCA IGM SER-ACNC: <36 U/ML (ref 0–35.9)
EOSINOPHIL # BLD AUTO: 0.34 10*3/MM3 (ref 0–0.7)
EOSINOPHIL NFR BLD AUTO: 10 % (ref 0.3–6.2)
ERYTHROCYTE [DISTWIDTH] IN BLOOD BY AUTOMATED COUNT: 15.5 % (ref 11.5–14.5)
ETHNIC BACKGROUND STATED: 29.1 MIU/ML (ref 2.6–18.5)
GFR SERPL CREATININE-BSD FRML MDRD: 56 ML/MIN/1.73
GLUCOSE BLD-MCNC: 187 MG/DL (ref 65–99)
GLUCOSE BLDC GLUCOMTR-MCNC: 205 MG/DL (ref 70–130)
GLUCOSE BLDC GLUCOMTR-MCNC: 230 MG/DL (ref 70–130)
GLUCOSE BLDC GLUCOMTR-MCNC: 345 MG/DL (ref 70–130)
GLUCOSE BLDC GLUCOMTR-MCNC: 421 MG/DL (ref 70–130)
HCT VFR BLD AUTO: 27.5 % (ref 40.4–52.2)
HGB BLD-MCNC: 9 G/DL (ref 13.7–17.6)
IMM GRANULOCYTES # BLD: 0.01 10*3/MM3 (ref 0–0.03)
IMM GRANULOCYTES NFR BLD: 0.3 % (ref 0–0.5)
INTERPRETATION: ABNORMAL
LYMPHOCYTES # BLD AUTO: 0.83 10*3/MM3 (ref 0.9–4.8)
LYMPHOCYTES NFR BLD AUTO: 24.4 % (ref 19.6–45.3)
MCH RBC QN AUTO: 29 PG (ref 27–32.7)
MCHC RBC AUTO-ENTMCNC: 32.7 G/DL (ref 32.6–36.4)
MCV RBC AUTO: 88.7 FL (ref 79.8–96.2)
MONOCYTES # BLD AUTO: 0.33 10*3/MM3 (ref 0.2–1.2)
MONOCYTES NFR BLD AUTO: 9.7 % (ref 5–12)
NEUTROPHILS # BLD AUTO: 1.89 10*3/MM3 (ref 1.9–8.1)
NEUTROPHILS NFR BLD AUTO: 55.6 % (ref 42.7–76)
PLATELET # BLD AUTO: 109 10*3/MM3 (ref 140–500)
PMV BLD AUTO: 9.1 FL (ref 6–12)
POTASSIUM BLD-SCNC: 4 MMOL/L (ref 3.5–5.2)
RBC # BLD AUTO: 3.1 10*6/MM3 (ref 4.6–6)
REF LAB TEST METHOD: NORMAL
SODIUM BLD-SCNC: 138 MMOL/L (ref 136–145)
WBC NRBC COR # BLD: 3.4 10*3/MM3 (ref 4.5–10.7)

## 2018-08-13 PROCEDURE — 92928 PRQ TCAT PLMT NTRAC ST 1 LES: CPT | Performed by: INTERNAL MEDICINE

## 2018-08-13 PROCEDURE — 93005 ELECTROCARDIOGRAM TRACING: CPT | Performed by: INTERNAL MEDICINE

## 2018-08-13 PROCEDURE — 63710000001 INSULIN ASPART PER 5 UNITS: Performed by: THORACIC SURGERY (CARDIOTHORACIC VASCULAR SURGERY)

## 2018-08-13 PROCEDURE — 25010000002 FENTANYL CITRATE (PF) 100 MCG/2ML SOLUTION: Performed by: INTERNAL MEDICINE

## 2018-08-13 PROCEDURE — 93458 L HRT ARTERY/VENTRICLE ANGIO: CPT | Performed by: INTERNAL MEDICINE

## 2018-08-13 PROCEDURE — C1725 CATH, TRANSLUMIN NON-LASER: HCPCS | Performed by: INTERNAL MEDICINE

## 2018-08-13 PROCEDURE — 25010000002 HEPARIN (PORCINE) PER 1000 UNITS: Performed by: INTERNAL MEDICINE

## 2018-08-13 PROCEDURE — 99024 POSTOP FOLLOW-UP VISIT: CPT | Performed by: INTERNAL MEDICINE

## 2018-08-13 PROCEDURE — 027135Z DILATION OF CORONARY ARTERY, TWO ARTERIES WITH TWO DRUG-ELUTING INTRALUMINAL DEVICES, PERCUTANEOUS APPROACH: ICD-10-PCS | Performed by: INTERNAL MEDICINE

## 2018-08-13 PROCEDURE — C9600 PERC DRUG-EL COR STENT SING: HCPCS | Performed by: INTERNAL MEDICINE

## 2018-08-13 PROCEDURE — 85025 COMPLETE CBC W/AUTO DIFF WBC: CPT | Performed by: INTERNAL MEDICINE

## 2018-08-13 PROCEDURE — 82962 GLUCOSE BLOOD TEST: CPT

## 2018-08-13 PROCEDURE — C1769 GUIDE WIRE: HCPCS | Performed by: INTERNAL MEDICINE

## 2018-08-13 PROCEDURE — 93010 ELECTROCARDIOGRAM REPORT: CPT | Performed by: INTERNAL MEDICINE

## 2018-08-13 PROCEDURE — 99153 MOD SED SAME PHYS/QHP EA: CPT | Performed by: INTERNAL MEDICINE

## 2018-08-13 PROCEDURE — 0 IOPAMIDOL PER 1 ML: Performed by: INTERNAL MEDICINE

## 2018-08-13 PROCEDURE — 99152 MOD SED SAME PHYS/QHP 5/>YRS: CPT | Performed by: INTERNAL MEDICINE

## 2018-08-13 PROCEDURE — 80048 BASIC METABOLIC PNL TOTAL CA: CPT | Performed by: INTERNAL MEDICINE

## 2018-08-13 PROCEDURE — C1887 CATHETER, GUIDING: HCPCS | Performed by: INTERNAL MEDICINE

## 2018-08-13 PROCEDURE — C1894 INTRO/SHEATH, NON-LASER: HCPCS | Performed by: INTERNAL MEDICINE

## 2018-08-13 PROCEDURE — 63710000001 INSULIN DETEMIR PER 5 UNITS: Performed by: HOSPITALIST

## 2018-08-13 PROCEDURE — C1874 STENT, COATED/COV W/DEL SYS: HCPCS | Performed by: INTERNAL MEDICINE

## 2018-08-13 PROCEDURE — 25010000002 MIDAZOLAM PER 1 MG: Performed by: INTERNAL MEDICINE

## 2018-08-13 PROCEDURE — B2111ZZ FLUOROSCOPY OF MULTIPLE CORONARY ARTERIES USING LOW OSMOLAR CONTRAST: ICD-10-PCS | Performed by: INTERNAL MEDICINE

## 2018-08-13 PROCEDURE — 85347 COAGULATION TIME ACTIVATED: CPT

## 2018-08-13 PROCEDURE — 4A023N7 MEASUREMENT OF CARDIAC SAMPLING AND PRESSURE, LEFT HEART, PERCUTANEOUS APPROACH: ICD-10-PCS | Performed by: INTERNAL MEDICINE

## 2018-08-13 DEVICE — XIENCE SIERRA™ EVEROLIMUS ELUTING CORONARY STENT SYSTEM 3.00 MM X 28 MM / RAPID-EXCHANGE
Type: IMPLANTABLE DEVICE | Site: CORONARY | Status: FUNCTIONAL
Brand: XIENCE SIERRA™

## 2018-08-13 DEVICE — XIENCE SIERRA™ EVEROLIMUS ELUTING CORONARY STENT SYSTEM 3.00 MM X 23 MM / RAPID-EXCHANGE
Type: IMPLANTABLE DEVICE | Site: CORONARY | Status: FUNCTIONAL
Brand: XIENCE SIERRA™

## 2018-08-13 RX ORDER — ONDANSETRON 4 MG/1
4 TABLET, ORALLY DISINTEGRATING ORAL EVERY 6 HOURS PRN
Status: DISCONTINUED | OUTPATIENT
Start: 2018-08-13 | End: 2018-08-14 | Stop reason: HOSPADM

## 2018-08-13 RX ORDER — ACETAMINOPHEN 325 MG/1
650 TABLET ORAL EVERY 4 HOURS PRN
Status: DISCONTINUED | OUTPATIENT
Start: 2018-08-13 | End: 2018-08-14 | Stop reason: HOSPADM

## 2018-08-13 RX ORDER — MIDAZOLAM HYDROCHLORIDE 1 MG/ML
INJECTION INTRAMUSCULAR; INTRAVENOUS AS NEEDED
Status: DISCONTINUED | OUTPATIENT
Start: 2018-08-13 | End: 2018-08-13 | Stop reason: HOSPADM

## 2018-08-13 RX ORDER — CLOPIDOGREL BISULFATE 75 MG/1
75 TABLET ORAL DAILY
Status: DISCONTINUED | OUTPATIENT
Start: 2018-08-14 | End: 2018-08-14 | Stop reason: HOSPADM

## 2018-08-13 RX ORDER — SODIUM CHLORIDE 9 MG/ML
INJECTION, SOLUTION INTRAVENOUS CONTINUOUS PRN
Status: DISCONTINUED | OUTPATIENT
Start: 2018-08-13 | End: 2018-08-13 | Stop reason: HOSPADM

## 2018-08-13 RX ORDER — HYDROCODONE BITARTRATE AND ACETAMINOPHEN 5; 325 MG/1; MG/1
1 TABLET ORAL EVERY 4 HOURS PRN
Status: DISCONTINUED | OUTPATIENT
Start: 2018-08-13 | End: 2018-08-14 | Stop reason: HOSPADM

## 2018-08-13 RX ORDER — ONDANSETRON 2 MG/ML
4 INJECTION INTRAMUSCULAR; INTRAVENOUS EVERY 6 HOURS PRN
Status: DISCONTINUED | OUTPATIENT
Start: 2018-08-13 | End: 2018-08-14 | Stop reason: HOSPADM

## 2018-08-13 RX ORDER — SODIUM CHLORIDE 9 MG/ML
125 INJECTION, SOLUTION INTRAVENOUS CONTINUOUS
Status: ACTIVE | OUTPATIENT
Start: 2018-08-13 | End: 2018-08-13

## 2018-08-13 RX ORDER — CLOPIDOGREL BISULFATE 75 MG/1
TABLET ORAL AS NEEDED
Status: DISCONTINUED | OUTPATIENT
Start: 2018-08-13 | End: 2018-08-13 | Stop reason: HOSPADM

## 2018-08-13 RX ORDER — ONDANSETRON 4 MG/1
4 TABLET, FILM COATED ORAL EVERY 6 HOURS PRN
Status: DISCONTINUED | OUTPATIENT
Start: 2018-08-13 | End: 2018-08-14 | Stop reason: HOSPADM

## 2018-08-13 RX ORDER — DOCUSATE SODIUM 100 MG/1
100 CAPSULE, LIQUID FILLED ORAL 2 TIMES DAILY
Status: DISCONTINUED | OUTPATIENT
Start: 2018-08-13 | End: 2018-08-14 | Stop reason: HOSPADM

## 2018-08-13 RX ORDER — ASPIRIN 325 MG
TABLET ORAL AS NEEDED
Status: DISCONTINUED | OUTPATIENT
Start: 2018-08-13 | End: 2018-08-13 | Stop reason: HOSPADM

## 2018-08-13 RX ORDER — HEPARIN SODIUM 1000 [USP'U]/ML
INJECTION, SOLUTION INTRAVENOUS; SUBCUTANEOUS AS NEEDED
Status: DISCONTINUED | OUTPATIENT
Start: 2018-08-13 | End: 2018-08-13 | Stop reason: HOSPADM

## 2018-08-13 RX ORDER — LIDOCAINE HYDROCHLORIDE 20 MG/ML
INJECTION, SOLUTION INFILTRATION; PERINEURAL AS NEEDED
Status: DISCONTINUED | OUTPATIENT
Start: 2018-08-13 | End: 2018-08-13 | Stop reason: HOSPADM

## 2018-08-13 RX ORDER — FENTANYL CITRATE 50 UG/ML
INJECTION, SOLUTION INTRAMUSCULAR; INTRAVENOUS AS NEEDED
Status: DISCONTINUED | OUTPATIENT
Start: 2018-08-13 | End: 2018-08-13 | Stop reason: HOSPADM

## 2018-08-13 RX ORDER — ATORVASTATIN CALCIUM 20 MG/1
40 TABLET, FILM COATED ORAL NIGHTLY
Status: DISCONTINUED | OUTPATIENT
Start: 2018-08-13 | End: 2018-08-14 | Stop reason: HOSPADM

## 2018-08-13 RX ORDER — ASPIRIN 81 MG/1
81 TABLET ORAL DAILY
Status: DISCONTINUED | OUTPATIENT
Start: 2018-08-13 | End: 2018-08-14 | Stop reason: HOSPADM

## 2018-08-13 RX ADMIN — MAGNESIUM HYDROXIDE 10 ML: 2400 SUSPENSION ORAL at 12:06

## 2018-08-13 RX ADMIN — LOSARTAN POTASSIUM 50 MG: 50 TABLET, FILM COATED ORAL at 08:48

## 2018-08-13 RX ADMIN — DOCUSATE SODIUM 100 MG: 100 CAPSULE, LIQUID FILLED ORAL at 16:36

## 2018-08-13 RX ADMIN — TEMAZEPAM 7.5 MG: 7.5 CAPSULE ORAL at 21:29

## 2018-08-13 RX ADMIN — ATORVASTATIN CALCIUM 40 MG: 20 TABLET, FILM COATED ORAL at 21:28

## 2018-08-13 RX ADMIN — INSULIN ASPART 12 UNITS: 100 INJECTION, SOLUTION INTRAVENOUS; SUBCUTANEOUS at 21:26

## 2018-08-13 RX ADMIN — Medication 10 ML: at 21:33

## 2018-08-13 RX ADMIN — SOTALOL HYDROCHLORIDE 80 MG: 80 TABLET ORAL at 08:48

## 2018-08-13 RX ADMIN — INSULIN DETEMIR 60 UNITS: 100 INJECTION, SOLUTION SUBCUTANEOUS at 21:29

## 2018-08-13 RX ADMIN — SOTALOL HYDROCHLORIDE 80 MG: 80 TABLET ORAL at 21:29

## 2018-08-13 RX ADMIN — INSULIN ASPART 7 UNITS: 100 INJECTION, SOLUTION INTRAVENOUS; SUBCUTANEOUS at 16:36

## 2018-08-13 RX ADMIN — METOPROLOL SUCCINATE 12.5 MG: 25 TABLET, FILM COATED, EXTENDED RELEASE ORAL at 21:28

## 2018-08-13 NOTE — PROGRESS NOTES
NEPHROLOGY PROGRESS NOTE    PATIENT IDENTIFICATION:   Name:  Ruben Milian      MRN:  [id number]     78 y.o.  male             Reason for visit: CKD2-3    SUBJECTIVE:    The patient is feeling better today, denies any chest pain or shortness of air, no orthopnea or PND, no nausea or vomiting, no dysuria or gross hematuria, he has lower extremity edema.  IV fluid was started last night in preparation for his heart catheterization this morning.    OBJECTIVE:  Vitals:    08/12/18 0912 08/12/18 1151 08/12/18 1617 08/12/18 1854   BP:  156/76 159/84 157/74   BP Location:  Left arm Left arm Right arm   Patient Position:  Sitting Sitting Lying   Pulse: 62 60 60 72   Resp:  18 16 14   Temp:  97.7 °F (36.5 °C) 98 °F (36.7 °C) 98.4 °F (36.9 °C)   TempSrc:  Oral Oral Oral   SpO2:   97% 95%   Weight:       Height:               Body mass index is 27.55 kg/m².    Intake/Output Summary (Last 24 hours) at 08/13/18 0702  Last data filed at 08/13/18 0355   Gross per 24 hour   Intake             1440 ml   Output             2225 ml   Net             -785 ml     Wt Readings from Last 1 Encounters:   08/11/18 2041 87.1 kg (192 lb)   08/10/18 0821 102 kg (225 lb)   08/09/18 0631 102 kg (225 lb)     Wt Readings from Last 3 Encounters:   08/11/18 87.1 kg (192 lb)         Exam:  General Appearance: alert, oriented x 3, no acute distress,   Skin: warm and dry  HEENT: pupils round and reactive to light, oral mucosa normal,   Neck: supple, no JVD, trachea midline, bilateral carotid bruits  Lungs: Scattered rhonchi, unlabored breathing effort  Heart: RRR, normal S1 and S2, no S3, no rub, grade 2/6 systolic murmur  Abdomen: soft, non-tender,  present bowel sounds to auscultation  : no palpable bladder,  Extremities: 1+ ankle edema, no cyanosis or clubbing  Neuro: normal speech and mental status        Scheduled meds:      aspirin 81 mg Oral Daily   atorvastatin 10 mg Oral Nightly   calcium carbonate 5 tablet Oral Daily   chlorthalidone 25  mg Oral Daily   cholecalciferol 1,000 Units Oral Daily   clopidogrel 75 mg Oral Daily   insulin aspart 0-9 Units Subcutaneous 4x Daily With Meals & Nightly   insulin detemir 55 Units Subcutaneous Nightly   losartan 50 mg Oral Q24H   metoprolol succinate XL 12.5 mg Oral Nightly   pantoprazole 40 mg Oral Q AM   sodium chloride 10 mL Intravenous Q12H   sotalol 80 mg Oral Q12H   vitamin B-12 500 mcg Oral Daily     IV meds:                          sodium chloride 100 mL/hr Last Rate: 100 mL/hr (08/13/18 0612)       Data Review:      Results from last 7 days  Lab Units 08/13/18  0337 08/12/18  0404 08/11/18  0305 08/10/18  0329   SODIUM mmol/L 138 138 136 141   POTASSIUM mmol/L 4.0 4.0 4.3 4.0   CHLORIDE mmol/L 102 102 101 105   CO2 mmol/L 24.6 25.7 24.4 25.6   BUN mg/dL 31* 28* 25* 24*   CREATININE mg/dL 1.24 1.50* 1.35* 1.25   CALCIUM mg/dL 7.7* 8.1* 7.9* 8.3*   BILIRUBIN mg/dL  --   --  0.3 0.4   ALK PHOS U/L  --   --  55 52   ALT (SGPT) U/L  --   --  18 14   AST (SGOT) U/L  --   --  19 14   GLUCOSE mg/dL 187* 150* 271* 106*     Estimated Creatinine Clearance: 60.5 mL/min (by C-G formula based on SCr of 1.24 mg/dL).    Results from last 7 days  Lab Units 08/09/18  2100   CREATININE UR mg/dL 92.3   PROTEIN TOTAL URINE mg/dL 282.0       Results from last 7 days  Lab Units 08/11/18  0305 08/10/18  0329 08/08/18  2303   MAGNESIUM mg/dL  --  2.0 2.0   PHOSPHORUS mg/dL 3.0  --   --          Results from last 7 days  Lab Units 08/13/18  0337 08/12/18  0404 08/11/18  0305 08/10/18  2010 08/10/18  0329 08/09/18  0545   WBC 10*3/mm3 3.40* 3.55* 3.16*  --  2.67* 2.36*   HEMOGLOBIN g/dL 9.0* 9.2* 9.7*  --  9.4* 7.8*   PLATELETS 10*3/mm3 109* 124* 121* 120* 113* 114*         Results from last 7 days  Lab Units 08/10/18  0329   INR  1.03             ASSESSMENT:   1.  CKD3: Associated with diabetic nephropathy, creatinine today 1.24 at baseline, patient has proteinuria associated with diabetic nephropathy, being prepped for heart  catheterization with normal saline.  2.   coronary artery disease, he had probable the carotid disease, he had recent MI  3.  Recent heart block and known paroxysmal atrial fibrillation  4.  Long-standing DM2 for at least 15 years  5.  Prostate CA with concern for bone mets  6. Pancytopenia, WBC 3.4, hemoglobin 9, platelets 109,000.  7.  Chronic cough, probably associated with the ACE inhibitor, currently on ARB.    Plan:  1.  To continue the same treatment, continue IV fluids for about 4 hours post the catheter.  We'll hold off the diuretics today  2.  Surveillance labs    Caden Kenny MD  8/13/2018    7:02 AM

## 2018-08-13 NOTE — PLAN OF CARE
Problem: Patient Care Overview  Goal: Plan of Care Review   08/13/18 0457   Coping/Psychosocial   Plan of Care Reviewed With patient   Plan of Care Review   Progress no change   OTHER   Outcome Summary VSS. PPM site remains intact, no issues. Plan for cath/PCI this AM with Calvin. No c/o tonight. Continuous IVF's continue. Pt does have a dry cough; lozenges at bedside. Will continue to monitor.

## 2018-08-13 NOTE — PROGRESS NOTES
"Daily progress note    Complaining  Doing same  Status post PCI  Denies any chest pain    History of present illness  78-year-old white male with history of prostate cancer status post chemotherapy other medical problem diabetes mellitus hypertension peptic ulcer disease admitted to [facility] with unstable angina and severe bradycardia and underwent cardiac catheterization which showed severe coronary artery disease transferred to [facility] for further management.  I'm asked to follow the patient for medical problems.  At the time of interview his denies any chest pain shortness of breath but is still very weak also denies any fever chills cough nausea vomiting.  Patient has no palpitation at the time of interview.     Physical Exam    Blood pressure 163/72, pulse 59, temperature 98.4 °F (36.9 °C), temperature source Oral, resp. rate 16, height 177.8 cm (70\"), weight 87.1 kg (192 lb), SpO2 96 %.    Constitutional: He is oriented to person, place, and time. He appears well-developed and well-nourished. He is cooperative.   Eyes: No scleral icterus.   Neck: Neck supple. Normal carotid pulses and no JVD present. Carotid bruit is not present.   Cardiovascular: Normal rate, regular rhythm, normal heart sounds and intact distal pulses.  Exam reveals no gallop and no friction rub.  No murmur heard.  Pulmonary/Chest: Effort normal and breath sounds normal. He has no wheezes. He has no rales. He exhibits no tenderness.   Abdominal: Soft. Bowel sounds are normal. He exhibits no distension and no mass. There is no hepatosplenomegaly. There is no tenderness. There is no guarding and no CVA tenderness.   Musculoskeletal: He exhibits edema (trace ankle). He exhibits no tenderness or deformity.   Neurological: He is alert and oriented to person, place, and time.   Skin: Skin is warm and dry. Capillary refill takes less than 2 seconds. No rash noted. No erythema.   Psychiatric: He has a normal mood and " affect. His behavior is normal. Judgment and thought content normal.      Results Review:   Lab Results (last 24 hours)     Procedure Component Value Units Date/Time    POC Glucose Once [[id number]]  (Abnormal) Collected:  08/13/18 1127    Specimen:  Blood Updated:  08/13/18 1129     Glucose 230 (H) mg/dL     Narrative:       Meter: [id number] : [id number] Sean RHODES    CMV IgG IgM [[id number]]  (Abnormal) Collected:  08/10/18 2010    Specimen:  Blood Updated:  08/13/18 0909     CMV IgG 8.20 (H) U/mL      Comment:                                Negative          <0.60                                 Equivocal   0.60 - 0.69                                 Positive          >0.69        CMV IgM <30.0 AU/mL      Comment:                                 Negative         <30.0                                  Equivocal  30.0 - 34.9                                  Positive         >34.9  A positive result is generally indicative of acute  infection, reactivation or persistent IgM production.       Narrative:       Performed at:  [address] [place], [place] [id number]  : Donell Bender PhD, Phone:  [phone]    POC Glucose Once [[id number]]  (Abnormal) Collected:  08/13/18 0710    Specimen:  Blood Updated:  08/13/18 0712     Glucose 205 (H) mg/dL     Narrative:       Meter: [id number] : [id number] Sean RHODES    Basic Metabolic Panel [[id number]]  (Abnormal) Collected:  08/13/18 0337    Specimen:  Blood Updated:  08/13/18 0419     Glucose 187 (H) mg/dL      BUN 31 (H) mg/dL      Creatinine 1.24 mg/dL      Sodium 138 mmol/L      Potassium 4.0 mmol/L      Chloride 102 mmol/L      CO2 24.6 mmol/L      Calcium 7.7 (L) mg/dL      eGFR Non African Amer 56 (L) mL/min/1.73      BUN/Creatinine Ratio 25.0     Anion Gap 11.4 mmol/L     Narrative:       The MDRD GFR formula is only valid for adults with stable renal function between ages 18 and 70.    CBC & Differential  [[id number]] Collected:  08/13/18 0337    Specimen:  Blood Updated:  08/13/18 0353    Narrative:       The following orders were created for panel order CBC & Differential.  Procedure                               Abnormality         Status                     ---------                               -----------         ------                     CBC Auto Differential[[id number]]        Abnormal            Final result                 Please view results for these tests on the individual orders.    CBC Auto Differential [[id number]]  (Abnormal) Collected:  08/13/18 0337    Specimen:  Blood Updated:  08/13/18 0353     WBC 3.40 (L) 10*3/mm3      RBC 3.10 (L) 10*6/mm3      Hemoglobin 9.0 (L) g/dL      Hematocrit 27.5 (L) %      MCV 88.7 fL      MCH 29.0 pg      MCHC 32.7 g/dL      RDW 15.5 (H) %      RDW-SD 50.1 fl      MPV 9.1 fL      Platelets 109 (L) 10*3/mm3      Neutrophil % 55.6 %      Lymphocyte % 24.4 %      Monocyte % 9.7 %      Eosinophil % 10.0 (H) %      Basophil % 0.3 %      Immature Grans % 0.3 %      Neutrophils, Absolute 1.89 (L) 10*3/mm3      Lymphocytes, Absolute 0.83 (L) 10*3/mm3      Monocytes, Absolute 0.33 10*3/mm3      Eosinophils, Absolute 0.34 10*3/mm3      Basophils, Absolute 0.01 10*3/mm3      Immature Grans, Absolute 0.01 10*3/mm3     POC Glucose Once [[id number]]  (Abnormal) Collected:  08/12/18 2126    Specimen:  Blood Updated:  08/12/18 2127     Glucose 281 (H) mg/dL     Narrative:       Meter: [id number] : [id number] Isabela Chaney RN    POC Glucose Once [[id number]]  (Abnormal) Collected:  08/12/18 1541    Specimen:  Blood Updated:  08/12/18 1542     Glucose 299 (H) mg/dL     Narrative:       Meter: [id number] : [id number] Herbert Quinn FREDERICK        Imaging Results (last 24 hours)     ** No results found for the last 24 hours. **           ECG 12 Lead       RR Interval= 759 ms  CT Interval= 192 ms  QRSD Interval= 166 ms  QT Interval= 428 ms  QTc Interval= 491 ms  Heart Rate= 79 ms  P  Axis= 75 deg  QRS Axis= 23 deg  T Wave Axis= 195 deg  I: 40 Axis= 47 deg  T: 40 Axis= -62 deg  ST Axis= 208 deg  SINUS RHYTHM  LEFT BUNDLE BRANCH BLOCK                Current Facility-Administered Medications:   •  acetaminophen (TYLENOL) tablet 650 mg, 650 mg, Oral, Q4H PRN **OR** acetaminophen (TYLENOL) 160 MG/5ML solution 650 mg, 650 mg, Oral, Q4H PRN **OR** acetaminophen (TYLENOL) suppository 650 mg, 650 mg, Rectal, Q4H PRN, Radha Alonzo APRN  •  acetaminophen (TYLENOL) tablet 650 mg, 650 mg, Oral, Q4H PRN, Albert Ruiz MD  •  acetaminophen (TYLENOL) tablet 650 mg, 650 mg, Oral, Q4H PRN, Rahul Simpson MD  •  ALPRAZolam (XANAX) tablet 0.25 mg, 0.25 mg, Oral, Q8H PRN, Albert Ruiz MD, 0.25 mg at 08/08/18 2307  •  aspirin EC tablet 81 mg, 81 mg, Oral, Daily, Albert Ruiz MD, 81 mg at 08/12/18 0912  •  aspirin EC tablet 81 mg, 81 mg, Oral, Daily, Rahul Simpson MD  •  atorvastatin (LIPITOR) tablet 10 mg, 10 mg, Oral, Nightly, Uvaldo Kumari MD, 10 mg at 08/12/18 2139  •  atorvastatin (LIPITOR) tablet 40 mg, 40 mg, Oral, Nightly, Rahul Simpson MD  •  benzocaine-menthol (CEPACOL) lozenge 1 lozenge, 1 lozenge, Mouth/Throat, Q2H PRN, Rahul Simpson MD  •  calcium carbonate (TUMS) chewable tablet 500 mg (200 mg elemental), 5 tablet, Oral, Daily, Albert Ruiz MD, 5 tablet at 08/09/18 1824  •  cholecalciferol (VITAMIN D3) tablet 1,000 Units, 1,000 Units, Oral, Daily, Albert Ruiz MD, 1,000 Units at 08/12/18 0912  •  clopidogrel (PLAVIX) tablet 75 mg, 75 mg, Oral, Daily, Rahul Simpson MD, 75 mg at 08/12/18 0912  •  [START ON 8/14/2018] clopidogrel (PLAVIX) tablet 75 mg, 75 mg, Oral, Daily, Rahul Simpson MD  •  dextrose (D50W) solution 25 g, 25 g, Intravenous, Q15 Min PRN, Albert Ruiz MD  •  dextrose (GLUTOSE) oral gel 15 g, 15 g, Oral, Q15 Min PRN, Albert Ruiz MD  •  diphenhydrAMINE (BENADRYL) capsule 25 mg, 25 mg, Oral, Nightly PRN, Albert Ruiz,  MD  •  glucagon (human recombinant) (GLUCAGEN DIAGNOSTIC) injection 1 mg, 1 mg, Subcutaneous, PRN, Albert Ruiz MD  •  heparin flush (porcine) 100 UNIT/ML injection 500 Units, 5 mL, Intravenous, PRN, Albert Ruiz MD  •  HYDROcodone-acetaminophen (NORCO)  MG per tablet 1 tablet, 1 tablet, Oral, Q4H PRN, Radha Alonzo A, APRN  •  HYDROcodone-acetaminophen (NORCO) 5-325 MG per tablet 1 tablet, 1 tablet, Oral, Q4H PRN, Radha Alonzo, APRN, 1 tablet at 08/11/18 2204  •  HYDROcodone-acetaminophen (NORCO) 5-325 MG per tablet 1 tablet, 1 tablet, Oral, Q4H PRN, Rahul Simpson MD  •  HYDROmorphone (DILAUDID) injection 0.5 mg, 0.5 mg, Intravenous, Q2H PRN **AND** naloxone (NARCAN) injection 0.4 mg, 0.4 mg, Intravenous, Q5 Min PRN, Radha Alonzo, APRN  •  insulin aspart (novoLOG) injection 0-9 Units, 0-9 Units, Subcutaneous, 4x Daily With Meals & Nightly, Albert Ruiz MD, 6 Units at 08/12/18 2100  •  insulin detemir (LEVEMIR) injection 55 Units, 55 Units, Subcutaneous, Nightly, Uvaldo Kumari MD, 55 Units at 08/12/18 2139  •  losartan (COZAAR) tablet 50 mg, 50 mg, Oral, Q24H, Tucker Kruse MD, 50 mg at 08/13/18 0848  •  Magnesium Sulfate 2 gram infusion - Mg less than or equal to 1.5 mg/dL, 2 g, Intravenous, PRN **OR** Magesium Sulfate 1 gram infusion - Mg 1.6-1.9 mg/dL, 1 g, Intravenous, PRN, Albert Ruiz MD  •  magnesium hydroxide (MILK OF MAGNESIA) suspension 2400 mg/10mL 10 mL, 10 mL, Oral, Daily PRN, Rahul Simpson MD, 10 mL at 08/13/18 1206  •  metoprolol succinate XL (TOPROL-XL) 24 hr tablet 12.5 mg, 12.5 mg, Oral, Nightly, Albert Ruiz MD, 12.5 mg at 08/12/18 2139  •  ondansetron (ZOFRAN) injection 4 mg, 4 mg, Intravenous, Q6H PRN, Radha Alonzo APRN  •  ondansetron (ZOFRAN) tablet 4 mg, 4 mg, Oral, Q6H PRN **OR** ondansetron ODT (ZOFRAN-ODT) disintegrating tablet 4 mg, 4 mg, Oral, Q6H PRN **OR** ondansetron (ZOFRAN) injection 4 mg, 4 mg, Intravenous, Q6H PRN, Calvin,  MD Rahul  •  pantoprazole (PROTONIX) EC tablet 40 mg, 40 mg, Oral, Q AM, Uvaldo Kumari MD, 40 mg at 08/12/18 0450  •  potassium chloride (MICRO-K) CR capsule 40 mEq, 40 mEq, Oral, PRN **OR** potassium chloride (KLOR-CON) packet 40 mEq, 40 mEq, Oral, PRN **OR** potassium chloride 10 mEq in 100 mL IVPB, 10 mEq, Intravenous, Q1H PRN, Albert Ruiz MD  •  sodium chloride 0.9 % flush 1-10 mL, 1-10 mL, Intravenous, PRN, Radha Alonzo APRN  •  sodium chloride 0.9 % flush 10 mL, 10 mL, Intravenous, Q12H, Albert Ruiz MD, 10 mL at 08/12/18 0912  •  sodium chloride 0.9 % flush 10 mL, 10 mL, Intravenous, PRN, Albert Ruiz MD  •  sodium chloride 0.9 % flush 20 mL, 20 mL, Intravenous, PRN, Albert Ruiz MD  •  sodium chloride 0.9 % infusion, 100 mL/hr, Intravenous, Continuous, Rahul Simpson MD, Last Rate: 100 mL/hr at 08/13/18 0819, 100 mL/hr at 08/13/18 0819  •  sodium chloride 0.9 % infusion, 125 mL/hr, Intravenous, Continuous, Rahul Simpson MD, Last Rate: 125 mL/hr at 08/13/18 1204, 125 mL/hr at 08/13/18 1204  •  sotalol (BETAPACE) tablet 80 mg, 80 mg, Oral, Q12H, Radha Alonzo APRN, 80 mg at 08/13/18 0848  •  temazepam (RESTORIL) capsule 7.5 mg, 7.5 mg, Oral, Nightly PRN, Albert Ruiz MD, 7.5 mg at 08/12/18 2139  •  vitamin B-12 (CYANOCOBALAMIN) tablet 500 mcg, 500 mcg, Oral, Daily, Myron Marcos Jr., MD, 500 mcg at 08/12/18 0912     ASSESSMENT  Coronary artery disease  Status post non-ST elevation MI S/P PCI  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 Calvin aguilar  recommendation according to hospital course    NANDA BRISENO MD

## 2018-08-13 NOTE — PROGRESS NOTES
Discharge Planning Assessment  [facility]     Patient Name: Ruben Milian  MRN: [id number]  Today's Date: 8/13/2018    Admit Date: 8/8/2018          Discharge Needs Assessment     Row Name 08/13/18 1117       Living Environment    Lives With spouse    Name(s) of Who Lives With Patient Charmaine Milian wife [phone]    Current Living Arrangements home/apartment/condo    Primary Care Provided by self    Provides Primary Care For no one    Family Caregiver if Needed spouse    Family Caregiver Names Charmaine Milian wife [phone]    Quality of Family Relationships supportive    Able to Return to Prior Arrangements yes       Resource/Environmental Concerns    Resource/Environmental Concerns none    Transportation Concerns car, none       Transition Planning    Patient/Family Anticipates Transition to home    Patient/Family Anticipated Services at Transition none    Transportation Anticipated family or friend will provide       Discharge Needs Assessment    Readmission Within the Last 30 Days no previous admission in last 30 days    Concerns to be Addressed no discharge needs identified    Equipment Currently Used at Home none    Anticipated Changes Related to Illness none    Equipment Needed After Discharge none    Offered/Gave Vendor List no            Discharge Plan     Row Name 08/13/18 5240       Plan    Plan Home with wife, denies needs    Patient/Family in Agreement with Plan yes    Plan Comments IMM 8/8/2018.  Met with patient, Charmaine Milian wife [phone] and several family members at bedside, patient granted me permission to speak to him while family remained in the room.  Prior to admission patient was independent with his ADL's with some assist from his wife.  Patient states that he uses the [facility] pharmacy, denies issues obtaining or affording his medications.  Patient does not have living will or POA, but states his wife knows his wishes. Reviewed faced sheet and updated face sheet with  patient PCP/NP Jami Stephen.  Discharge plan is to return home, denies any additional needs, will follow up with PCP/NP.  Will continue to monitor for any new or changing discharge needs.         Destination     No service coordination in this encounter.      Durable Medical Equipment     No service coordination in this encounter.      Dialysis/Infusion     No service coordination in this encounter.      Home Medical Care     No service coordination in this encounter.      Social Care     No service coordination in this encounter.                Demographic Summary     Row Name 08/13/18 1109       General Information    Admission Type inpatient    Arrived From other (see comments)   Transfer from [facility]     Required Notices Provided Important Message from Medicare    Referral Source admission list    Reason for Consult discharge planning    Preferred Language English     Used During This Interaction no       Contact Information    Permission Granted to Share Info With family/designee   Charmaine Milian wife [phone]            Functional Status     Row Name 08/13/18 1110       Functional Status    Usual Activity Tolerance good    Current Activity Tolerance moderate       Functional Status, IADL    Medications independent    Meal Preparation assistive person    Housekeeping assistive person    Laundry assistive person    Shopping assistive person       Mental Status    General Appearance WDL WDL;appearance    General Appearance well-kept, clean       Mental Status Summary    Recent Changes in Mental Status/Cognitive Functioning no changes       Employment/    Employment Status retired            Psychosocial    No documentation.           Abuse/Neglect    No documentation.           Legal    No documentation.           Substance Abuse    No documentation.           Patient Forms    No documentation.         Vy Fong RN

## 2018-08-13 NOTE — CONSULTS
LOS: 5 days   Patient Care Team:  Jami Stephen APRN as PCP - General (Nurse Practitioner)  Carlos Ulrich MD (Cardiology)    Chief Complaint: Bradycardia       Interval History:   Had percutaneous stenting performed this am.   No further episodes of atrial fibrillation since restarting sotalol 80 mg BID.   No complaints this afternoon.      Objective   Vital Signs  Temp:  [97.9 °F (36.6 °C)] 97.9 °F (36.6 °C)  Heart Rate:  [59-77] 77  Resp:  [16-20] 20  BP: (143-185)/(68-93) 185/86    Intake/Output Summary (Last 24 hours) at 08/13/18 1921  Last data filed at 08/13/18 1643   Gross per 24 hour   Intake              325 ml   Output             1350 ml   Net            -1025 ml       Comfortable NAD  PERRL, conjunctiva clear  Neck supple, no JVD or thyromegaly appreciated  S1/S2 RRR, no m/r/g  Lungs CTA B, normal effort  Pacemaker site, c/d/i w/o evidence of hematoma  Abdomen S/NT/ND (+) BS,   Extremities warm, no clubbing, cyanosis, or edema  Normal gait  No visible or palpable skin lesions  A/Ox4, mood and affect appropriate    Results Review:        Results from last 7 days  Lab Units 08/13/18 0337 08/12/18  0404 08/11/18  0305   SODIUM mmol/L 138 138 136   POTASSIUM mmol/L 4.0 4.0 4.3   CHLORIDE mmol/L 102 102 101   CO2 mmol/L 24.6 25.7 24.4   BUN mg/dL 31* 28* 25*   CREATININE mg/dL 1.24 1.50* 1.35*   GLUCOSE mg/dL 187* 150* 271*   CALCIUM mg/dL 7.7* 8.1* 7.9*       Results from last 7 days  Lab Units 08/10/18  2010 08/10/18  0329 08/08/18  2303   TROPONIN T ng/mL 0.239* 0.223* 0.245*       Results from last 7 days  Lab Units 08/13/18 0337 08/12/18  0404 08/11/18  0305   WBC 10*3/mm3 3.40* 3.55* 3.16*   HEMOGLOBIN g/dL 9.0* 9.2* 9.7*   HEMATOCRIT % 27.5* 28.8* 28.7*   PLATELETS 10*3/mm3 109* 124* 121*       Results from last 7 days  Lab Units 08/10/18  0329   INR  1.03   APTT seconds 28.6       Results from last 7 days  Lab Units 08/10/18  0329   CHOLESTEROL mg/dL 106       Results from last 7  days  Lab Units 08/10/18  0329   MAGNESIUM mg/dL 2.0       Results from last 7 days  Lab Units 08/10/18  0329   CHOLESTEROL mg/dL 106   TRIGLYCERIDES mg/dL 139   HDL CHOL mg/dL 27*   LDL CHOL mg/dL 51       I reviewed the patient's new clinical results.  I personally viewed and interpreted the patient's EKG/Telemetry data        Biventricular pacing atrial sensed ventricular paced          Medication Review:     aspirin 81 mg Oral Daily   aspirin 81 mg Oral Daily   atorvastatin 10 mg Oral Nightly   atorvastatin 40 mg Oral Nightly   calcium carbonate 5 tablet Oral Daily   cholecalciferol 1,000 Units Oral Daily   clopidogrel 75 mg Oral Daily   [START ON 8/14/2018] clopidogrel 75 mg Oral Daily   docusate sodium 100 mg Oral BID   insulin aspart 0-9 Units Subcutaneous 4x Daily With Meals & Nightly   insulin detemir 60 Units Subcutaneous Nightly   losartan 50 mg Oral Q24H   metoprolol succinate XL 12.5 mg Oral Nightly   pantoprazole 40 mg Oral Q AM   sodium chloride 10 mL Intravenous Q12H   sotalol 80 mg Oral Q12H   vitamin B-12 500 mcg Oral Daily         sodium chloride 100 mL/hr Last Rate: 100 mL/hr (08/13/18 0819)   sodium chloride 125 mL/hr Last Rate: 125 mL/hr (08/13/18 1204)       Assessment/Plan      Paroxsymal Atrial Fibrillation  Complete Heart Block  Impaired LV Function    Patient is well controlled on Sotalol 80 mg BID, recommend continuing.  He will need pacemaker wound check follow-up in 1 week, and then normal follow-up after.  Anticoagulation is currently held given anemia and thrombocytopenia.  He would now need triple therapy, given recent stenting.  It would be reasonable to hold until anti-platelet therapy completed.      Shan Castano MD  08/13/18  7:21 PM

## 2018-08-14 VITALS
SYSTOLIC BLOOD PRESSURE: 170 MMHG | DIASTOLIC BLOOD PRESSURE: 77 MMHG | BODY MASS INDEX: 32.07 KG/M2 | HEIGHT: 70 IN | WEIGHT: 224 LBS | OXYGEN SATURATION: 96 % | RESPIRATION RATE: 18 BRPM | TEMPERATURE: 97.8 F | HEART RATE: 60 BPM

## 2018-08-14 LAB
ALBUMIN SERPL-MCNC: 3.1 G/DL (ref 3.5–5.2)
ANION GAP SERPL CALCULATED.3IONS-SCNC: 9.2 MMOL/L
BASOPHILS # BLD AUTO: 0.02 10*3/MM3 (ref 0–0.2)
BASOPHILS NFR BLD AUTO: 0.6 % (ref 0–1.5)
BUN BLD-MCNC: 27 MG/DL (ref 8–23)
BUN/CREAT SERPL: 23.1 (ref 7–25)
CALCIUM SPEC-SCNC: 8 MG/DL (ref 8.6–10.5)
CHLORIDE SERPL-SCNC: 103 MMOL/L (ref 98–107)
CHOLEST SERPL-MCNC: 102 MG/DL (ref 0–200)
CO2 SERPL-SCNC: 27.8 MMOL/L (ref 22–29)
CREAT BLD-MCNC: 1.17 MG/DL (ref 0.76–1.27)
DEPRECATED RDW RBC AUTO: 49.9 FL (ref 37–54)
EOSINOPHIL # BLD AUTO: 0.31 10*3/MM3 (ref 0–0.7)
EOSINOPHIL NFR BLD AUTO: 9.3 % (ref 0.3–6.2)
ERYTHROCYTE [DISTWIDTH] IN BLOOD BY AUTOMATED COUNT: 15.3 % (ref 11.5–14.5)
GFR SERPL CREATININE-BSD FRML MDRD: 60 ML/MIN/1.73
GLUCOSE BLD-MCNC: 150 MG/DL (ref 65–99)
GLUCOSE BLDC GLUCOMTR-MCNC: 156 MG/DL (ref 70–130)
GLUCOSE BLDC GLUCOMTR-MCNC: 235 MG/DL (ref 70–130)
HCT VFR BLD AUTO: 29.1 % (ref 40.4–52.2)
HDLC SERPL-MCNC: 26 MG/DL (ref 40–60)
HGB BLD-MCNC: 9.1 G/DL (ref 13.7–17.6)
IMM GRANULOCYTES # BLD: 0 10*3/MM3 (ref 0–0.03)
IMM GRANULOCYTES NFR BLD: 0 % (ref 0–0.5)
LDLC SERPL CALC-MCNC: 56 MG/DL (ref 0–100)
LDLC/HDLC SERPL: 2.16 {RATIO}
LYMPHOCYTES # BLD AUTO: 0.74 10*3/MM3 (ref 0.9–4.8)
LYMPHOCYTES NFR BLD AUTO: 22.1 % (ref 19.6–45.3)
MAGNESIUM SERPL-MCNC: 2.3 MG/DL (ref 1.6–2.4)
MCH RBC QN AUTO: 28.2 PG (ref 27–32.7)
MCHC RBC AUTO-ENTMCNC: 31.3 G/DL (ref 32.6–36.4)
MCV RBC AUTO: 90.1 FL (ref 79.8–96.2)
MONOCYTES # BLD AUTO: 0.28 10*3/MM3 (ref 0.2–1.2)
MONOCYTES NFR BLD AUTO: 8.4 % (ref 5–12)
NEUTROPHILS # BLD AUTO: 2 10*3/MM3 (ref 1.9–8.1)
NEUTROPHILS NFR BLD AUTO: 59.6 % (ref 42.7–76)
PHOSPHATE SERPL-MCNC: 2.8 MG/DL (ref 2.5–4.5)
PLATELET # BLD AUTO: 110 10*3/MM3 (ref 140–500)
PMV BLD AUTO: 9.6 FL (ref 6–12)
POTASSIUM BLD-SCNC: 4 MMOL/L (ref 3.5–5.2)
RBC # BLD AUTO: 3.23 10*6/MM3 (ref 4.6–6)
SODIUM BLD-SCNC: 140 MMOL/L (ref 136–145)
TRIGL SERPL-MCNC: 99 MG/DL (ref 0–150)
URATE SERPL-MCNC: 5 MG/DL (ref 3.4–7)
VLDLC SERPL-MCNC: 19.8 MG/DL (ref 5–40)
WBC NRBC COR # BLD: 3.35 10*3/MM3 (ref 4.5–10.7)

## 2018-08-14 PROCEDURE — 93005 ELECTROCARDIOGRAM TRACING: CPT | Performed by: INTERNAL MEDICINE

## 2018-08-14 PROCEDURE — 82962 GLUCOSE BLOOD TEST: CPT

## 2018-08-14 PROCEDURE — 83735 ASSAY OF MAGNESIUM: CPT | Performed by: INTERNAL MEDICINE

## 2018-08-14 PROCEDURE — 80061 LIPID PANEL: CPT | Performed by: INTERNAL MEDICINE

## 2018-08-14 PROCEDURE — 84550 ASSAY OF BLOOD/URIC ACID: CPT | Performed by: INTERNAL MEDICINE

## 2018-08-14 PROCEDURE — 85025 COMPLETE CBC W/AUTO DIFF WBC: CPT | Performed by: INTERNAL MEDICINE

## 2018-08-14 PROCEDURE — 80069 RENAL FUNCTION PANEL: CPT | Performed by: INTERNAL MEDICINE

## 2018-08-14 PROCEDURE — 25010000002 HEPARIN FLUSH (PORCINE) 100 UNIT/ML SOLUTION: Performed by: THORACIC SURGERY (CARDIOTHORACIC VASCULAR SURGERY)

## 2018-08-14 PROCEDURE — 63710000001 INSULIN ASPART PER 5 UNITS: Performed by: THORACIC SURGERY (CARDIOTHORACIC VASCULAR SURGERY)

## 2018-08-14 PROCEDURE — 93010 ELECTROCARDIOGRAM REPORT: CPT | Performed by: INTERNAL MEDICINE

## 2018-08-14 PROCEDURE — 99238 HOSP IP/OBS DSCHRG MGMT 30/<: CPT | Performed by: INTERNAL MEDICINE

## 2018-08-14 RX ORDER — SOTALOL HYDROCHLORIDE 80 MG/1
80 TABLET ORAL EVERY 12 HOURS SCHEDULED
Qty: 30 TABLET | Refills: 11 | Status: SHIPPED | OUTPATIENT
Start: 2018-08-14 | End: 2018-12-10 | Stop reason: SDUPTHER

## 2018-08-14 RX ORDER — CHLORTHALIDONE 25 MG/1
25 TABLET ORAL DAILY
Qty: 30 TABLET | Refills: 11 | Status: SHIPPED | OUTPATIENT
Start: 2018-08-14

## 2018-08-14 RX ORDER — AMLODIPINE BESYLATE 5 MG/1
5 TABLET ORAL DAILY
Status: DISCONTINUED | OUTPATIENT
Start: 2018-08-14 | End: 2018-08-14 | Stop reason: HOSPADM

## 2018-08-14 RX ORDER — AMLODIPINE BESYLATE 5 MG/1
5 TABLET ORAL DAILY
Qty: 30 TABLET | Refills: 11 | Status: SHIPPED | OUTPATIENT
Start: 2018-08-14 | End: 2018-10-08 | Stop reason: SDUPTHER

## 2018-08-14 RX ORDER — ATORVASTATIN CALCIUM 40 MG/1
40 TABLET, FILM COATED ORAL NIGHTLY
Qty: 30 TABLET | Refills: 11 | Status: SHIPPED | OUTPATIENT
Start: 2018-08-14 | End: 2018-10-08 | Stop reason: SDUPTHER

## 2018-08-14 RX ORDER — PANTOPRAZOLE SODIUM 40 MG/1
40 TABLET, DELAYED RELEASE ORAL DAILY
Qty: 30 TABLET | Refills: 11 | Status: SHIPPED | OUTPATIENT
Start: 2018-08-14 | End: 2018-10-08 | Stop reason: SDUPTHER

## 2018-08-14 RX ORDER — CHLORTHALIDONE 25 MG/1
25 TABLET ORAL DAILY
Status: DISCONTINUED | OUTPATIENT
Start: 2018-08-14 | End: 2018-08-14 | Stop reason: HOSPADM

## 2018-08-14 RX ORDER — LOSARTAN POTASSIUM 50 MG/1
50 TABLET ORAL
Qty: 30 TABLET | Refills: 11 | Status: SHIPPED | OUTPATIENT
Start: 2018-08-14 | End: 2018-10-08 | Stop reason: SDUPTHER

## 2018-08-14 RX ORDER — CLOPIDOGREL BISULFATE 75 MG/1
75 TABLET ORAL DAILY
Qty: 30 TABLET | Refills: 11 | Status: SHIPPED | OUTPATIENT
Start: 2018-08-14 | End: 2018-10-08 | Stop reason: SDUPTHER

## 2018-08-14 RX ORDER — CHLORTHALIDONE 25 MG/1
25 TABLET ORAL DAILY
Qty: 30 TABLET | Refills: 11 | Status: CANCELLED | OUTPATIENT
Start: 2018-08-14

## 2018-08-14 RX ADMIN — Medication 10 ML: at 09:00

## 2018-08-14 RX ADMIN — INSULIN ASPART 2 UNITS: 100 INJECTION, SOLUTION INTRAVENOUS; SUBCUTANEOUS at 08:54

## 2018-08-14 RX ADMIN — PANTOPRAZOLE SODIUM 40 MG: 40 TABLET, DELAYED RELEASE ORAL at 06:34

## 2018-08-14 RX ADMIN — INSULIN ASPART 4 UNITS: 100 INJECTION, SOLUTION INTRAVENOUS; SUBCUTANEOUS at 11:14

## 2018-08-14 RX ADMIN — ASPIRIN 81 MG: 81 TABLET, DELAYED RELEASE ORAL at 08:52

## 2018-08-14 RX ADMIN — Medication 500 UNITS: at 12:54

## 2018-08-14 RX ADMIN — VITAMIN D, TAB 1000IU (100/BT) 1000 UNITS: 25 TAB at 08:51

## 2018-08-14 RX ADMIN — SOTALOL HYDROCHLORIDE 80 MG: 80 TABLET ORAL at 08:52

## 2018-08-14 RX ADMIN — Medication 500 MCG: at 08:52

## 2018-08-14 RX ADMIN — DOCUSATE SODIUM 100 MG: 100 CAPSULE, LIQUID FILLED ORAL at 08:52

## 2018-08-14 RX ADMIN — LOSARTAN POTASSIUM 50 MG: 50 TABLET, FILM COATED ORAL at 08:52

## 2018-08-14 RX ADMIN — CLOPIDOGREL 75 MG: 75 TABLET, FILM COATED ORAL at 08:52

## 2018-08-14 RX ADMIN — AMLODIPINE BESYLATE 5 MG: 5 TABLET ORAL at 10:02

## 2018-08-14 RX ADMIN — CHLORTHALIDONE 25 MG: 25 TABLET ORAL at 13:38

## 2018-08-14 NOTE — PROGRESS NOTES
"Daily progress note    Complaining  Doing better  No complaints  Wants to go home    History of present illness  78-year-old white male with history of prostate cancer status post chemotherapy other medical problem diabetes mellitus hypertension peptic ulcer disease admitted to [facility] with unstable angina and severe bradycardia and underwent cardiac catheterization which showed severe coronary artery disease transferred to [facility] [place], operated by [facility] for further management.  I'm asked to follow the patient for medical problems.  At the time of interview his denies any chest pain shortness of breath but is still very weak also denies any fever chills cough nausea vomiting.  Patient has no palpitation at the time of interview.     Physical Exam    Blood pressure 159/71, pulse 70, temperature 98.8 °F (37.1 °C), temperature source Oral, resp. rate 18, height 177.8 cm (70\"), weight 102 kg (224 lb), SpO2 96 %.    Constitutional: He is oriented to person, place, and time. He appears well-developed and well-nourished. He is cooperative.   Eyes: No scleral icterus.   Neck: Neck supple. Normal carotid pulses and no JVD present. Carotid bruit is not present.   Cardiovascular: Normal rate, regular rhythm, normal heart sounds and intact distal pulses.  Exam reveals no gallop and no friction rub.  No murmur heard.  Pulmonary/Chest: Effort normal and breath sounds normal. He has no wheezes. He has no rales. He exhibits no tenderness.   Abdominal: Soft. Bowel sounds are normal. He exhibits no distension and no mass. There is no hepatosplenomegaly. There is no tenderness. There is no guarding and no CVA tenderness.   Musculoskeletal: He exhibits edema (trace ankle). He exhibits no tenderness or deformity.   Neurological: He is alert and oriented to person, place, and time.   Skin: Skin is warm and dry. Capillary refill takes less than 2 seconds. No rash noted. No erythema.   Psychiatric: He has a normal mood and affect. " His behavior is normal. Judgment and thought content normal.      Results Review:   Lab Results (last 24 hours)     Procedure Component Value Units Date/Time    POC Glucose Once [[id number]]  (Abnormal) Collected:  08/14/18 1045    Specimen:  Blood Updated:  08/14/18 1047     Glucose 235 (H) mg/dL     Narrative:       Meter: [id number] : [id number] aMria Luz Mcelroy CNA    Renal Function Panel [[id number]]  (Abnormal) Collected:  08/14/18 0623    Specimen:  Blood Updated:  08/14/18 0713     Glucose 150 (H) mg/dL      BUN 27 (H) mg/dL      Creatinine 1.17 mg/dL      Sodium 140 mmol/L      Potassium 4.0 mmol/L      Chloride 103 mmol/L      CO2 27.8 mmol/L      Calcium 8.0 (L) mg/dL      Albumin 3.10 (L) g/dL      Phosphorus 2.8 mg/dL      Anion Gap 9.2 mmol/L      BUN/Creatinine Ratio 23.1     eGFR Non African Amer 60 (L) mL/min/1.73     Narrative:       The MDRD GFR formula is only valid for adults with stable renal function between ages 18 and 70.    Uric Acid [[id number]]  (Normal) Collected:  08/14/18 0623    Specimen:  Blood Updated:  08/14/18 0713     Uric Acid 5.0 mg/dL     Lipid Panel [[id number]]  (Abnormal) Collected:  08/14/18 0623    Specimen:  Blood Updated:  08/14/18 0713     Total Cholesterol 102 mg/dL      Triglycerides 99 mg/dL      HDL Cholesterol 26 (L) mg/dL      LDL Cholesterol  56 mg/dL      VLDL Cholesterol 19.8 mg/dL      LDL/HDL Ratio 2.16    Narrative:       Cholesterol Reference Ranges  (U.S. Department of Health and Human Services ATP III Classifications)    Desirable          <200 mg/dL  Borderline High    200-239 mg/dL  High Risk          >240 mg/dL      Triglyceride Reference Ranges  (U.S. Department of Health and Human Services ATP III Classifications)    Normal           <150 mg/dL  Borderline High  150-199 mg/dL  High             200-499 mg/dL  Very High        >500 mg/dL    HDL Reference Ranges  (U.S. Department of Health and Human Services ATP III Classifcations)    Low     <40 mg/dl  (major risk factor for CHD)  High    >60 mg/dl ('negative' risk factor for CHD)        LDL Reference Ranges  (U.S. Department of Health and Human Services ATP III Classifcations)    Optimal          <100 mg/dL  Near Optimal     100-129 mg/dL  Borderline High  130-159 mg/dL  High             160-189 mg/dL  Very High        >189 mg/dL    Magnesium [[id number]]  (Normal) Collected:  08/14/18 0623    Specimen:  Blood Updated:  08/14/18 0713     Magnesium 2.3 mg/dL     CBC & Differential [[id number]] Collected:  08/14/18 0623    Specimen:  Blood Updated:  08/14/18 0649    Narrative:       The following orders were created for panel order CBC & Differential.  Procedure                               Abnormality         Status                     ---------                               -----------         ------                     CBC Auto Differential[[id number]]        Abnormal            Final result                 Please view results for these tests on the individual orders.    CBC Auto Differential [[id number]]  (Abnormal) Collected:  08/14/18 0623    Specimen:  Blood Updated:  08/14/18 0649     WBC 3.35 (L) 10*3/mm3      RBC 3.23 (L) 10*6/mm3      Hemoglobin 9.1 (L) g/dL      Hematocrit 29.1 (L) %      MCV 90.1 fL      MCH 28.2 pg      MCHC 31.3 (L) g/dL      RDW 15.3 (H) %      RDW-SD 49.9 fl      MPV 9.6 fL      Platelets 110 (L) 10*3/mm3      Neutrophil % 59.6 %      Lymphocyte % 22.1 %      Monocyte % 8.4 %      Eosinophil % 9.3 (H) %      Basophil % 0.6 %      Immature Grans % 0.0 %      Neutrophils, Absolute 2.00 10*3/mm3      Lymphocytes, Absolute 0.74 (L) 10*3/mm3      Monocytes, Absolute 0.28 10*3/mm3      Eosinophils, Absolute 0.31 10*3/mm3      Basophils, Absolute 0.02 10*3/mm3      Immature Grans, Absolute 0.00 10*3/mm3     POC Glucose Once [[id number]]  (Abnormal) Collected:  08/14/18 0645    Specimen:  Blood Updated:  08/14/18 0648     Glucose 156 (H) mg/dL     Narrative:       Meter: [id number] :  [id number] Walt Verma EMAGAN    POC Glucose Once [[id number]]  (Abnormal) Collected:  08/13/18 2050    Specimen:  Blood Updated:  08/13/18 2059     Glucose 421 (H) mg/dL     Narrative:       RN Notified R and V Meter: [id number] : [id number] Rony Trent RN    POC Glucose Once [[id number]]  (Abnormal) Collected:  08/13/18 1629    Specimen:  Blood Updated:  08/13/18 1632     Glucose 345 (H) mg/dL     Narrative:       RN Notified R and V Meter: [id number] : [id number] Lawrence Ibanez NA    POC Activated Clotting Time [[id number]]  (Abnormal) Collected:  08/13/18 0759    Specimen:  Blood Updated:  08/13/18 1535     Activated Clotting Time  340 (H) Seconds      Comment: Serial Number: [id number]:  [id number]       Jennifer-Barr Virus VCA Antibody Panel [[id number]]  (Abnormal) Collected:  08/10/18 2010    Specimen:  Blood Updated:  08/13/18 1517     EBV VCA IgM <36.0 U/mL      Comment:                                  Negative        <36.0                                   Equivocal 36.0 - 43.9                                   Positive        >43.9        EBV Early Antigen Ab, IgG <9.0 U/mL      Comment:                                  Negative        < 9.0                                   Equivocal  9.0 - 10.9                                   Positive        >10.9        EBV VCA IgG >600.0 (H) U/mL      Comment:                                  Negative        <18.0                                   Equivocal 18.0 - 21.9                                   Positive        >21.9        EBV Nuclear Antigen Ab, IgG 416.0 (H) U/mL      Comment:                                  Negative        <18.0                                   Equivocal 18.0 - 21.9                                   Positive        >21.9        Interpretation Comment     Comment:                EBV Interpretation Chart  Interpretation   EBV-IgM  EA(D)-IgG  VCA-IgG  EBNA-IgG  EBV Seronegative    -        -         -          -  Early Phase         +         -         -          -  Acute Primary       +       +or-       +          -  Infection  Convalescence/Past  -       +or-       +          +  Infection  Reactivated        +or-      +         +          +  Infection         + Antibody Present      - Antibody Absent       Narrative:       Performed at:  [address]  [id number]  : Donell Bender PhD, Phone:  [phone]    Erythropoietin [[id number]]  (Abnormal) Collected:  08/10/18 2010    Specimen:  Blood Updated:  08/13/18 1517     Erythropoietin 29.1 (H) mIU/mL      Comment: ProtoStar DxI 800 Immunoassay System       Narrative:       Performed at:  [address]  [id number]  : Donell Bender PhD, Phone:  [phone]    Flow Cytometry, Blood [[id number]] Collected:  08/11/18 0305    Specimen:  Blood Updated:  08/13/18 1512     Reference Lab Report --        Imaging Results (last 24 hours)     ** No results found for the last 24 hours. **           ECG 12 Lead       RR Interval= 759 ms  TX Interval= 192 ms  QRSD Interval= 166 ms  QT Interval= 428 ms  QTc Interval= 491 ms  Heart Rate= 79 ms  P Axis= 75 deg  QRS Axis= 23 deg  T Wave Axis= 195 deg  I: 40 Axis= 47 deg  T: 40 Axis= -62 deg  ST Axis= 208 deg  SINUS RHYTHM  LEFT BUNDLE BRANCH BLOCK                Current Facility-Administered Medications:   •  acetaminophen (TYLENOL) tablet 650 mg, 650 mg, Oral, Q4H PRN **OR** acetaminophen (TYLENOL) 160 MG/5ML solution 650 mg, 650 mg, Oral, Q4H PRN **OR** acetaminophen (TYLENOL) suppository 650 mg, 650 mg, Rectal, Q4H PRN, Radha Alonzo APRN  •  acetaminophen (TYLENOL) tablet 650 mg, 650 mg, Oral, Q4H PRN, Albert Ruiz MD  •  acetaminophen (TYLENOL) tablet 650 mg, 650 mg, Oral, Q4H PRN, Rahul Simpson MD  •  ALPRAZolam (XANAX) tablet 0.25 mg, 0.25 mg, Oral, Q8H PRN, Albert Ruiz MD, 0.25 mg at 08/08/18 3257  •  amLODIPine (NORVASC) tablet 5 mg, 5  mg, Oral, Daily, Rahul Simpson MD, 5 mg at 08/14/18 1002  •  aspirin EC tablet 81 mg, 81 mg, Oral, Daily, Albert Ruiz MD, 81 mg at 08/14/18 0852  •  aspirin EC tablet 81 mg, 81 mg, Oral, Daily, Rahul Simpson MD  •  atorvastatin (LIPITOR) tablet 10 mg, 10 mg, Oral, Nightly, Uvaldo Kumari MD, 10 mg at 08/12/18 2139  •  atorvastatin (LIPITOR) tablet 40 mg, 40 mg, Oral, Nightly, Rahul Simpson MD, 40 mg at 08/13/18 2128  •  benzocaine-menthol (CEPACOL) lozenge 1 lozenge, 1 lozenge, Mouth/Throat, Q2H PRN, Rahul Simpson MD  •  calcium carbonate (TUMS) chewable tablet 500 mg (200 mg elemental), 5 tablet, Oral, Daily, Albert Ruiz MD, 5 tablet at 08/09/18 1824  •  chlorthalidone (HYGROTON) tablet 25 mg, 25 mg, Oral, Daily, Rahul Simpson MD  •  cholecalciferol (VITAMIN D3) tablet 1,000 Units, 1,000 Units, Oral, Daily, Albert Ruiz MD, 1,000 Units at 08/14/18 0851  •  clopidogrel (PLAVIX) tablet 75 mg, 75 mg, Oral, Daily, Rahul Simpson MD, 75 mg at 08/14/18 0852  •  clopidogrel (PLAVIX) tablet 75 mg, 75 mg, Oral, Daily, Rahul Simpson MD  •  dextrose (D50W) solution 25 g, 25 g, Intravenous, Q15 Min PRN, Albert Ruiz MD  •  dextrose (GLUTOSE) oral gel 15 g, 15 g, Oral, Q15 Min PRN, Albert Ruiz MD  •  diphenhydrAMINE (BENADRYL) capsule 25 mg, 25 mg, Oral, Nightly PRN, Albert Ruiz MD  •  docusate sodium (COLACE) capsule 100 mg, 100 mg, Oral, BID, Rahul Simpson MD, 100 mg at 08/14/18 0852  •  glucagon (human recombinant) (GLUCAGEN DIAGNOSTIC) injection 1 mg, 1 mg, Subcutaneous, PRN, Albert Ruiz MD  •  heparin flush (porcine) 100 UNIT/ML injection 500 Units, 5 mL, Intravenous, PRN, Albert Ruiz MD  •  HYDROcodone-acetaminophen (NORCO)  MG per tablet 1 tablet, 1 tablet, Oral, Q4H PRN, Radha Alonzo A, APRN  •  HYDROcodone-acetaminophen (NORCO) 5-325 MG per tablet 1 tablet, 1 tablet, Oral, Q4H PRN, Radha Alonzo, APRN, 1 tablet at 08/11/18  2204  •  HYDROcodone-acetaminophen (NORCO) 5-325 MG per tablet 1 tablet, 1 tablet, Oral, Q4H PRN, Rahul Simpson MD  •  HYDROmorphone (DILAUDID) injection 0.5 mg, 0.5 mg, Intravenous, Q2H PRN **AND** naloxone (NARCAN) injection 0.4 mg, 0.4 mg, Intravenous, Q5 Min PRN, Radha Alonzo APRN  •  insulin aspart (novoLOG) injection 0-9 Units, 0-9 Units, Subcutaneous, 4x Daily With Meals & Nightly, Albert Ruiz MD, 4 Units at 08/14/18 1114  •  insulin detemir (LEVEMIR) injection 60 Units, 60 Units, Subcutaneous, Nightly, Uvaldo Kumari MD, 60 Units at 08/13/18 2129  •  losartan (COZAAR) tablet 50 mg, 50 mg, Oral, Q24H, Tucker Kruse MD, 50 mg at 08/14/18 0852  •  Magnesium Sulfate 2 gram infusion - Mg less than or equal to 1.5 mg/dL, 2 g, Intravenous, PRN **OR** Magesium Sulfate 1 gram infusion - Mg 1.6-1.9 mg/dL, 1 g, Intravenous, PRN, Albert Ruiz MD  •  magnesium hydroxide (MILK OF MAGNESIA) suspension 2400 mg/10mL 10 mL, 10 mL, Oral, Daily PRN, Rahul Simpson MD, 10 mL at 08/13/18 1206  •  metoprolol succinate XL (TOPROL-XL) 24 hr tablet 12.5 mg, 12.5 mg, Oral, Nightly, Albert Ruiz MD, 12.5 mg at 08/13/18 2128  •  ondansetron (ZOFRAN) injection 4 mg, 4 mg, Intravenous, Q6H PRN, Radha Alonzo APRN  •  ondansetron (ZOFRAN) tablet 4 mg, 4 mg, Oral, Q6H PRN **OR** ondansetron ODT (ZOFRAN-ODT) disintegrating tablet 4 mg, 4 mg, Oral, Q6H PRN **OR** ondansetron (ZOFRAN) injection 4 mg, 4 mg, Intravenous, Q6H PRN, Rahul Simpson MD  •  pantoprazole (PROTONIX) EC tablet 40 mg, 40 mg, Oral, Q AM, Uvaldo Kumari MD, 40 mg at 08/14/18 0634  •  potassium chloride (MICRO-K) CR capsule 40 mEq, 40 mEq, Oral, PRN **OR** potassium chloride (KLOR-CON) packet 40 mEq, 40 mEq, Oral, PRN **OR** potassium chloride 10 mEq in 100 mL IVPB, 10 mEq, Intravenous, Q1H PRN, Albert Ruiz MD  •  sodium chloride 0.9 % flush 1-10 mL, 1-10 mL, Intravenous, PRN, Radha Alonzo APRN  •  sodium chloride 0.9 % flush 10  mL, 10 mL, Intravenous, Q12H, Albert Ruiz MD, 10 mL at 08/14/18 0900  •  sodium chloride 0.9 % flush 10 mL, 10 mL, Intravenous, PRN, Albert Ruiz MD  •  sodium chloride 0.9 % flush 20 mL, 20 mL, Intravenous, PRN, Albert Ruiz MD  •  sodium chloride 0.9 % infusion, 100 mL/hr, Intravenous, Continuous, Rahul Simpson MD, Last Rate: 100 mL/hr at 08/13/18 0819, 100 mL/hr at 08/13/18 0819  •  sotalol (BETAPACE) tablet 80 mg, 80 mg, Oral, Q12H, Radha Alonzo APRN, 80 mg at 08/14/18 0852  •  temazepam (RESTORIL) capsule 7.5 mg, 7.5 mg, Oral, Nightly PRN, Albert Ruiz MD, 7.5 mg at 08/13/18 2129  •  vitamin B-12 (CYANOCOBALAMIN) tablet 500 mcg, 500 mcg, Oral, Daily, Myron Marcos Jr., MD, 500 mcg at 08/14/18 0852     ASSESSMENT  Coronary artery disease  Status post non-ST elevation MI S/P PCI  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

## 2018-08-14 NOTE — PROGRESS NOTES
NEPHROLOGY PROGRESS NOTE    PATIENT IDENTIFICATION:   Name:  Ruben Milian      MRN:  [id number]     78 y.o.  male             Reason for visit: CKD2    SUBJECTIVE:    Feels fine.  Not soa.  No chest pain.  Eating. Bowels moving. Urinating.   OBJECTIVE:  Vitals:    08/13/18 2359 08/14/18 0500 08/14/18 0846 08/14/18 1000   BP: 154/63  156/67 159/71   BP Location: Left arm  Left arm Left arm   Patient Position: Lying  Lying Sitting   Pulse: 62  60 70   Resp: 16 18 18   Temp: 98 °F (36.7 °C)  98.8 °F (37.1 °C)    TempSrc: Oral  Oral    SpO2: 96%  96%    Weight:  102 kg (224 lb)     Height:               Body mass index is 32.14 kg/m².    Intake/Output Summary (Last 24 hours) at 08/14/18 1149  Last data filed at 08/14/18 0646   Gross per 24 hour   Intake              240 ml   Output              925 ml   Net             -685 ml     Wt Readings from Last 1 Encounters:   08/14/18 0500 102 kg (224 lb)   08/11/18 2041 87.1 kg (192 lb)   08/10/18 0821 102 kg (225 lb)   08/09/18 0631 102 kg (225 lb)     Wt Readings from Last 3 Encounters:   08/14/18 102 kg (224 lb)         Exam:  General Appearance: alert, oriented x 3, no acute distress   Skin: warm and dry  HEENT: pupils round and reactive to light, oral mucosa normal,   Neck: supple, no JVD, trachea midline, bilateral carotid bruits  Lungs clear to auscultation   Heart: RRR, normal S1 and S2, no S3, no rub, grade 2/6 systolic murmur  Abdomen: soft, non-tender, +bs  Extremities:trace  ankle edema.  Neuro: normal speech and mental status        Scheduled meds:      amLODIPine 5 mg Oral Daily   aspirin 81 mg Oral Daily   aspirin 81 mg Oral Daily   atorvastatin 10 mg Oral Nightly   atorvastatin 40 mg Oral Nightly   calcium carbonate 5 tablet Oral Daily   chlorthalidone 25 mg Oral Daily   cholecalciferol 1,000 Units Oral Daily   clopidogrel 75 mg Oral Daily   clopidogrel 75 mg Oral Daily   docusate sodium 100 mg Oral BID   insulin aspart 0-9 Units Subcutaneous 4x Daily With  Meals & Nightly   insulin detemir 60 Units Subcutaneous Nightly   losartan 50 mg Oral Q24H   metoprolol succinate XL 12.5 mg Oral Nightly   pantoprazole 40 mg Oral Q AM   sodium chloride 10 mL Intravenous Q12H   sotalol 80 mg Oral Q12H   vitamin B-12 500 mcg Oral Daily     IV meds:                          sodium chloride 100 mL/hr Last Rate: 100 mL/hr (08/13/18 0819)       Data Review:      Results from last 7 days  Lab Units 08/14/18  0623 08/13/18  0337 08/12/18  0404 08/11/18  0305 08/10/18  0329   SODIUM mmol/L 140 138 138 136 141   POTASSIUM mmol/L 4.0 4.0 4.0 4.3 4.0   CHLORIDE mmol/L 103 102 102 101 105   CO2 mmol/L 27.8 24.6 25.7 24.4 25.6   BUN mg/dL 27* 31* 28* 25* 24*   CREATININE mg/dL 1.17 1.24 1.50* 1.35* 1.25   CALCIUM mg/dL 8.0* 7.7* 8.1* 7.9* 8.3*   BILIRUBIN mg/dL  --   --   --  0.3 0.4   ALK PHOS U/L  --   --   --  55 52   ALT (SGPT) U/L  --   --   --  18 14   AST (SGOT) U/L  --   --   --  19 14   GLUCOSE mg/dL 150* 187* 150* 271* 106*     Estimated Creatinine Clearance: 62.3 mL/min (by C-G formula based on SCr of 1.17 mg/dL).    Results from last 7 days  Lab Units 08/14/18  0623 08/09/18  2100   CREATININE UR mg/dL  --  92.3   PROTEIN TOTAL URINE mg/dL  --  282.0   URIC ACID mg/dL 5.0  --        Results from last 7 days  Lab Units 08/14/18  0623 08/11/18  0305 08/10/18  0329 08/08/18  2303   MAGNESIUM mg/dL 2.3  --  2.0 2.0   PHOSPHORUS mg/dL 2.8 3.0  --   --          Results from last 7 days  Lab Units 08/14/18  0623 08/13/18  0337 08/12/18  0404 08/11/18  0305 08/10/18  2010 08/10/18  0329   WBC 10*3/mm3 3.35* 3.40* 3.55* 3.16*  --  2.67*   HEMOGLOBIN g/dL 9.1* 9.0* 9.2* 9.7*  --  9.4*   PLATELETS 10*3/mm3 110* 109* 124* 121* 120* 113*         Results from last 7 days  Lab Units 08/10/18  0329   INR  1.03             ASSESSMENT:   1.  CKD3: Diabetic nephropathy.  Creatinine today 1.17 today after cardiac cath yesterday.   2. CAD.  SP SHERRI to LAD and distal CX yesterday.  Systolic and diastolic  cardiomyopathy .  3. Recent heart block and known paroxysmal atrial fibrillation  4.  DM2  5.  Metastatic prostate cancer to bone.   6. Pancytopenia, WBC 3.4, hemoglobin 9.1,  platelets 110,000. Dr. Marcos addressing.   7.  Chronic cough, probably associated with the ACE inhibitor, changed to ARB.   8. HTN.  Norvasc, chlorthalidone, losartan.   Plan:  1.  Ok with renal for dc  2. Has follow up with Dr. Kruse Sept 6 at 2:30 pm    Noemi Dang MD  8/14/2018    11:49 AM

## 2018-08-14 NOTE — DISCHARGE SUMMARY
Ruben Milian  [id number]    Date of Admit: 8/8/2018  Date of Discharge:  8/14/2018    Discharge Diagnosis:  Active Hospital Problems    Diagnosis Date Noted   • Coronary artery disease involving native coronary artery of native heart with unstable angina pectoris (CMS/HCC) [I25.110] 08/09/2018      Resolved Hospital Problems    Diagnosis Date Noted Date Resolved   No resolved problems to display.           Hospital Course:   This is a 78 year old male, former smoker with history of CKD,DM, HTN, CAD s/p stenting in 2012, HLD, PAF, LBBB, and prostate cancer s/p chemotherapy. He was originally at [facility] with severe bradycardia and he was a NSTEMI. His sotalol therapy had been discontinued and he underwent cardiac catheterization where he was found to have 2 vessel disease and was sent here for bypass evaluation. Due to his prostate cancer with bone metastases, he was not a surgery candidate.    He also had complete heart block, EP was consulted where he was started on sotalol therapy again and underwent biventricular pacemaker. Due to his anemia and thrombocytopenia, hematology was consulted, where the pt did not appear to be actively bleeding and it was recommended that the patient could undergo stent placement. He had successful SHERRI to the proximal LAD and circumflex yesterday.     He has had no further episodes of atrial fibrillation since restarting his sotalol 80 mg BID. Anticoagulation will be held given his anemia/thrombocytopenia and recent stenting. Plans to resume anticoagulation until anti-platelet therapy is completed.     He is now stable for discharge to home.  He will follow-up with Dr. Justin in [facility] in the next week and continue his cardiac care down there  Procedures Performed  Procedure(s):  Percutaneous Coronary Intervention  Stent SHERRI coronary      Cardiac Catheterization on 8/13/18-  FINDINGS:     LEFT VENTRICULOGRAPHY: The LV pressure was 150/10 .  Dense calcium in all the  coronaries     CORONARY ANGIOGRAPHY:  Left main: Normal  Left anterior descendin% proximal LAD lesion diffuse 30% mid LAD disease distally the vessel is normal the first diagonal has a 60% origin lesion and it second diagonal has some mild luminal irregularities  Ramus intermedius:Not present  Circumflex: Circumflex has some 40% proximal disease then comes down in the distal circumflex is a 90% lesion  RCA: Is a dominant vessel.  There is a stent in the proximal RCA that's widely patent and looks good mild 10% luminal irregularities in the mid RCA is a stent in the distal RCA that's widely patent and looks good there is some 20-30% distal RCA proximal PDA disease and then there is about a 50% to 60% mid PDA lesion but it's way too small to intervene on     Interventional Note:   10,000 units of heparin was given and the ACT was 340. A 6 Russian Voda left 3.5 guiding catheter was passed up and intubated the left main coronary artery.  A BMW wire was passed into the distal LAD.  A 3.0 x 20 mm trek balloon was inflated at 14 lele in the proximal LAD.  At that point we then implanted a 3.0 x 23 mm Xience Nancy drug-eluting stent in the proximal LAD at 14 lele.  We postdilated that with a 3.0 x 20 mm NC trek balloon at 20 lele.  There was 0% residual MARILY-3 flow at that point we then removed the wire and positioned it in the distal circumflex.  We predilated the distal circumflex lesion with a 3.0 x 20 mm trek balloon at 10 lele.  And then we implanted a 3.0 x 20 mm Xience Nancy drug-eluting stent at 14 lele.  We postdilated that with a 3.0 x 20 mm NC trek balloon at 20 lele twice.  There was 0% residual and MARILY-3 flow and at that point this case was halted.  Balloons wires and guides were removed and a comfort band was applied           SUMMARY: Successful multivessel drug-eluting stenting to the proximal LAD and distal circumflex    Consults     Date and Time Order Name Status Description    2018 1616 Cardiac  Electrophysiologist Inpatient Consult Completed     8/9/2018 1616 Inpatient Nephrology Consult      8/9/2018 1200 Hematology & Oncology Inpatient Consult Completed     8/8/2018 2116 Inpatient Hospitalist Consult Completed           Discharge Medications     Your medication list      START taking these medications      Instructions Last Dose Given Next Dose Due   amLODIPine 5 MG tablet  Commonly known as:  NORVASC      Take 1 tablet by mouth Daily.       clopidogrel 75 MG tablet  Commonly known as:  PLAVIX      Take 1 tablet by mouth Daily.       cyanocobalamin 500 MCG tablet  Commonly known as:  VITAMIN B-12      Take 1 tablet by mouth Daily.       losartan 50 MG tablet  Commonly known as:  COZAAR      Take 1 tablet by mouth Daily.       pantoprazole 40 MG EC tablet  Commonly known as:  PROTONIX      Take 1 tablet by mouth Daily.       sotalol 80 MG tablet  Commonly known as:  BETAPACE      Take 1 tablet by mouth Every 12 (Twelve) Hours.          CHANGE how you take these medications      Instructions Last Dose Given Next Dose Due   atorvastatin 40 MG tablet  Commonly known as:  LIPITOR  What changed:  · medication strength  · how much to take      Take 1 tablet by mouth Every Night.          CONTINUE taking these medications      Instructions Last Dose Given Next Dose Due   aspirin 81 MG EC tablet      Take 81 mg by mouth Daily.       calcium carbonate 600 MG tablet  Commonly known as:  OS-BLAISE      Take 600 mg by mouth 2 (Two) Times a Day With Meals.       cholecalciferol 1000 units tablet  Commonly known as:  VITAMIN D3      Take 1,000 Units by mouth Daily.       glipiZIDE 10 MG tablet  Commonly known as:  GLUCOTROL      Take 10 mg by mouth 2 (Two) Times a Day.       insulin glargine 100 UNIT/ML injection  Commonly known as:  LANTUS      Inject 40 Units under the skin into the appropriate area as directed Every Night.          STOP taking these medications    amLODIPine-benazepril 10-40 MG per capsule  Commonly  known as:  LOTREL        metoprolol succinate XL 25 MG 24 hr tablet  Commonly known as:  TOPROL-XL        spironolactone 25 MG tablet  Commonly known as:  ALDACTONE              Where to Get Your Medications      These medications were sent to [place], [place] - [address] [facility] - [phone]  - [phone]  [address] [facility] [address]    Phone:  [phone]   · amLODIPine 5 MG tablet  · atorvastatin 40 MG tablet  · clopidogrel 75 MG tablet  · cyanocobalamin 500 MCG tablet  · losartan 50 MG tablet  · pantoprazole 40 MG EC tablet  · sotalol 80 MG tablet         Discharge Diet:  healthy heart    Activity at Discharge: as tolerated    Discharge disposition: home    Condition on Discharge: stable    Follow-up Appointments  No future appointments.  Additional Instructions for the Follow-ups that You Need to Schedule     Discharge Follow-up with Specialty: Follow up with Liliana Larson in 1 week and follow up with Dr. Simpson in 4 weeks, get your pacemaker checked along with wound check in 1 week and follow up with Dr. Castano in 8 weeks    As directed      Specialty:  Follow up with Liliana Larson in 1 week and follow up with Dr. Simpson in 4 weeks, get your pacemaker checked along with wound check in 1 week and follow up with Dr. Castano in 8 weeks               Test Results Pending at Discharge       Rahul Simpson MD  08/14/18  12:07 PM

## 2018-08-15 ENCOUNTER — READMISSION MANAGEMENT (OUTPATIENT)
Dept: CALL CENTER | Facility: HOSPITAL | Age: 78
End: 2018-08-15

## 2018-08-15 NOTE — OUTREACH NOTE
Prep Survey      Responses   Facility patient discharged from?  [facility]   Is patient eligible?  Yes   Discharge diagnosis  Coronary artery disease involving native coronary artery of native heart with unstable angina pectoris (CMS/[facility]) I25.110   Does the patient have one of the following disease processes/diagnoses(primary or secondary)?  Other   Does the patient have Home health ordered?  No   Is there a DME ordered?  No   Prep survey completed?  Yes          Vy Rivas RN

## 2018-08-18 ENCOUNTER — READMISSION MANAGEMENT (OUTPATIENT)
Dept: CALL CENTER | Facility: HOSPITAL | Age: 78
End: 2018-08-18

## 2018-08-18 NOTE — OUTREACH NOTE
Medical Week 1 Survey      Responses   Facility patient discharged from?  [facility]   Does the patient have one of the following disease processes/diagnoses(primary or secondary)?  Other   Is there a successful TCM telephone encounter documented?  No   Week 1 attempt successful?  No   Unsuccessful attempts  Attempt 1          Anny Chu RN

## 2018-08-21 ENCOUNTER — READMISSION MANAGEMENT (OUTPATIENT)
Dept: CALL CENTER | Facility: HOSPITAL | Age: 78
End: 2018-08-21

## 2018-08-21 ENCOUNTER — OFFICE VISIT CONVERTED (OUTPATIENT)
Dept: FAMILY MEDICINE CLINIC | Facility: CLINIC | Age: 78
End: 2018-08-21
Attending: NURSE PRACTITIONER

## 2018-08-21 NOTE — OUTREACH NOTE
Medical Week 1 Survey      Responses   Facility patient discharged from?  [facility]   Does the patient have one of the following disease processes/diagnoses(primary or secondary)?  Other   Is there a successful TCM telephone encounter documented?  No   Week 1 attempt successful?  Yes   Call start time  1745   Call end time  1750   Discharge diagnosis  Coronary artery disease involving native coronary artery of native heart with unstable angina pectoris (CMS/[facility]) I25.110   Meds reviewed with patient/caregiver?  Yes   Is the patient having any side effects they believe may be caused by any medication additions or changes?  No   Does the patient have all medications ordered at discharge?  Yes   Is the patient taking all medications as directed (includes completed medication regime)?  Yes   Comments regarding appointments  Appt on 08/22 with cardiology, and Dr Larson PCP on 08/22   Does the patient have a primary care provider?   Yes   Does the patient have an appointment with their PCP within 7 days of discharge?  Yes   Comments regarding PCP  Has appt on 08/22 with PCP   Has the patient kept scheduled appointments due by today?  Yes   Psychosocial issues?  No   Did the patient receive a copy of their discharge instructions?  Yes   Nursing interventions  Reviewed instructions with patient   What is the patient's perception of their health status since discharge?  Improving   Is the patient/caregiver able to teach back signs and symptoms related to disease process for when to call PCP?  Yes   Is the patient/caregiver able to teach back signs and symptoms related to disease process for when to call 911?  Yes   Is the patient/caregiver able to teach back the hierarchy of who to call/visit for symptoms/problems? PCP, Specialist, Home health nurse, Urgent Care, ED, 911  Yes   Week 1 call completed?  Yes          Charlie Vick RN

## 2018-08-22 ENCOUNTER — OFFICE VISIT (OUTPATIENT)
Dept: CARDIOLOGY | Facility: CLINIC | Age: 78
End: 2018-08-22

## 2018-08-22 ENCOUNTER — CLINICAL SUPPORT NO REQUIREMENTS (OUTPATIENT)
Dept: CARDIOLOGY | Facility: CLINIC | Age: 78
End: 2018-08-22

## 2018-08-22 VITALS
BODY MASS INDEX: 31.92 KG/M2 | WEIGHT: 223 LBS | SYSTOLIC BLOOD PRESSURE: 138 MMHG | OXYGEN SATURATION: 99 % | DIASTOLIC BLOOD PRESSURE: 70 MMHG | HEIGHT: 70 IN | HEART RATE: 60 BPM

## 2018-08-22 DIAGNOSIS — I48.0 PAROXYSMAL ATRIAL FIBRILLATION (HCC): ICD-10-CM

## 2018-08-22 DIAGNOSIS — Z95.0 S/P BIVENTRICULAR CARDIAC PACEMAKER PROCEDURE: ICD-10-CM

## 2018-08-22 DIAGNOSIS — I25.10 CORONARY ARTERY DISEASE INVOLVING NATIVE CORONARY ARTERY OF NATIVE HEART WITHOUT ANGINA PECTORIS: Primary | ICD-10-CM

## 2018-08-22 DIAGNOSIS — I44.2 COMPLETE HEART BLOCK (HCC): ICD-10-CM

## 2018-08-22 DIAGNOSIS — I44.7 LBBB (LEFT BUNDLE BRANCH BLOCK): Primary | ICD-10-CM

## 2018-08-22 PROCEDURE — 93281 PM DEVICE PROGR EVAL MULTI: CPT | Performed by: INTERNAL MEDICINE

## 2018-08-22 PROCEDURE — 93000 ELECTROCARDIOGRAM COMPLETE: CPT | Performed by: PHYSICIAN ASSISTANT

## 2018-08-22 PROCEDURE — 99214 OFFICE O/P EST MOD 30 MIN: CPT | Performed by: PHYSICIAN ASSISTANT

## 2018-08-22 NOTE — PROGRESS NOTES
Date of Office Visit: 2018  Encounter Provider: EMILIANA Rios  Place of Service: [facility] CARDIOLOGY  Patient Name: Ruben Milina  :1940    Chief Complaint   Patient presents with   • Coronary Artery Disease     1 week hospital follow up   :     HPI: Ruben Milian is a 78 y.o. male , new to me, who presents today for follow-up.  Old records have been obtained and reviewed by me.  He is a patient with a past medical history significant for chronic kidney disease, diabetes, hypertension, coronary artery disease status post stenting in , hyperlipidemia, paroxysmal atrial fibrillation, and prostate cancer with metastases to his bone.  He was originally seen at [facility] with severe bradycardia and a non-STEMI.  He had previously been on sotalol therapy for his atrial fibrillation, and this was discontinued.  Because of the non-STEMI he underwent cardiac catheterization which showed severe 2 vessel disease.  He was transferred to [facility].  He was found to have complete heart block, as well as a known left bundle branch block, and underwent biventricular pacemaker implantation with CRT therapy by Dr. Ruth.  He eventually underwent successful drug-eluting stent placement to the proximal LAD and circumflex, he was felt to be a poor surgical candidate because of his metastatic cancer.  He did go into atrial fibrillation, and his sotalol was restarted after his pacemaker implantation.  He converted to sinus rhythm.  He was eventually discharged home in stable condition and is here today for follow-up.  He did have his pacemaker interrogated, it is functioning well.  This was also reviewed by Dr. Ruth.   Since he's been home he's been doing great.  He denies any chest pain, shortness of breath, palpitations, edema, dizziness, or syncope.  He is just a little fatigued.  He has not had any bleeding issues or melena on the aspirin and  Plavix.      Past Medical History:   Diagnosis Date   • Anemia    • Cancer (CMS/HCC)     prostate   • Cardiomyopathy (CMS/HCC)    • Chronic kidney disease    • Complete heart block (CMS/HCC)    • Diabetes (CMS/HCC)    • Diabetic retinopathy (CMS/HCC)    • H/O angioplasty    • LBBB (left bundle branch block)    • Pancytopenia (CMS/HCC)    • Peptic ulcer        Past Surgical History:   Procedure Laterality Date   • AMPUTATION FINGER / THUMB     • APPENDECTOMY     • CARDIAC CATHETERIZATION N/A 8/13/2018    Procedure: Stent SHERRI coronary;  Surgeon: Rahul Simpson MD;  [facility];  Service: Cardiology   • CARDIAC ELECTROPHYSIOLOGY PROCEDURE N/A 8/10/2018    Procedure: Biventricular Device Insertion- Pacemaker;  Surgeon: Shan Castano MD;  Location:  [facility] CATH INVASIVE LOCATION;  Service: Cardiology   • OK RT/LT HEART CATHETERS N/A 8/13/2018    Procedure: Percutaneous Coronary Intervention;  Surgeon: Rahul Simpson MD;  Location:  [facility] CATH INVASIVE LOCATION;  Service: Cardiology       Social History     Social History   • Marital status:      Spouse name: N/A   • Number of children: N/A   • Years of education: N/A     Occupational History   • Not on file.     Social History Main Topics   • Smoking status: Former Smoker     Packs/day: 1.00     Types: Cigarettes     Start date: 1/1/1963     Quit date: 1/1/1975   • Smokeless tobacco: Never Used   • Alcohol use No   • Drug use: No   • Sexual activity: Defer     Other Topics Concern   • Not on file     Social History Narrative   • No narrative on file       Family History   Problem Relation Age of Onset   • Heart disease Mother    • Heart disease Sister    • No Known Problems Father    • No Known Problems Maternal Grandmother    • No Known Problems Maternal Grandfather    • No Known Problems Paternal Grandmother    • No Known Problems Paternal Grandfather        Review of Systems   Constitution: Positive for malaise/fatigue.  Negative for chills and fever.   Cardiovascular: Negative for chest pain, dyspnea on exertion, leg swelling, near-syncope, orthopnea, palpitations, paroxysmal nocturnal dyspnea and syncope.   Respiratory: Negative for cough and shortness of breath.    Musculoskeletal: Negative for joint pain, joint swelling and myalgias.   Gastrointestinal: Negative for abdominal pain, diarrhea, melena, nausea and vomiting.   Genitourinary: Negative for frequency and hematuria.   Neurological: Negative for light-headedness, numbness, paresthesias and seizures.   Allergic/Immunologic: Negative.    All other systems reviewed and are negative.      No Known Allergies      Current Outpatient Prescriptions:   •  amLODIPine (NORVASC) 5 MG tablet, Take 1 tablet by mouth Daily., Disp: 30 tablet, Rfl: 11  •  aspirin 81 MG EC tablet, Take 81 mg by mouth Daily., Disp: , Rfl:   •  atorvastatin (LIPITOR) 40 MG tablet, Take 1 tablet by mouth Every Night., Disp: 30 tablet, Rfl: 11  •  calcium carbonate (OS-BLAISE) 600 MG tablet, Take 600 mg by mouth 2 (Two) Times a Day With Meals., Disp: , Rfl:   •  chlorthalidone (HYGROTON) 25 MG tablet, Take 1 tablet by mouth Daily., Disp: 30 tablet, Rfl: 11  •  cholecalciferol (VITAMIN D3) 1000 units tablet, Take 1,000 Units by mouth Daily., Disp: , Rfl:   •  clopidogrel (PLAVIX) 75 MG tablet, Take 1 tablet by mouth Daily., Disp: 30 tablet, Rfl: 11  •  cyanocobalamin (VITAMIN B-12) 500 MCG tablet, Take 1 tablet by mouth Daily., Disp: 30 tablet, Rfl: 11  •  glipiZIDE (GLUCOTROL) 10 MG tablet, Take 10 mg by mouth 2 (Two) Times a Day., Disp: , Rfl:   •  insulin glargine (LANTUS) 100 UNIT/ML injection, Inject 40 Units under the skin into the appropriate area as directed Every Night., Disp: , Rfl:   •  losartan (COZAAR) 50 MG tablet, Take 1 tablet by mouth Daily., Disp: 30 tablet, Rfl: 11  •  pantoprazole (PROTONIX) 40 MG EC tablet, Take 1 tablet by mouth Daily., Disp: 30 tablet, Rfl: 11  •  sotalol (BETAPACE) 80 MG  "tablet, Take 1 tablet by mouth Every 12 (Twelve) Hours., Disp: 30 tablet, Rfl: 11      Objective:     Vitals:    08/22/18 1254 08/22/18 1259   BP: 132/68 138/70   BP Location: Right arm Left arm   Pulse: 60    SpO2: 99%    Weight: 101 kg (223 lb)    Height: 177.8 cm (70\")      Body mass index is 32 kg/m².    PHYSICAL EXAM:    Physical Exam   Constitutional: He is oriented to person, place, and time. He appears well-developed and well-nourished. No distress.   HENT:   Head: Normocephalic and atraumatic.   Eyes: Pupils are equal, round, and reactive to light.   Neck: No JVD present. No thyromegaly present.   Cardiovascular: Normal rate, regular rhythm, normal heart sounds and intact distal pulses.    No murmur heard.  Right radial cath site well healed without erythema or echymosis, palpable proximal and distal pulses, good capillary refill   Pulmonary/Chest: Effort normal and breath sounds normal. No respiratory distress.   Abdominal: Soft. Bowel sounds are normal. He exhibits no distension. There is no splenomegaly or hepatomegaly. There is no tenderness.   Musculoskeletal: Normal range of motion. He exhibits no edema.   Neurological: He is alert and oriented to person, place, and time.   Skin: Skin is warm and dry. He is not diaphoretic. No erythema.   Left subclavian pacemaker site well healed without erythema or edema.   Psychiatric: He has a normal mood and affect. His behavior is normal. Judgment normal.         ECG 12 Lead  Date/Time: 8/22/2018 1:15 PM  Performed by: BONNIE HENDERSON  Authorized by: BONNIE HENDERSON   Comparison: compared with previous ECG from 8/13/2018  Similar to previous ECG  Rhythm: paced  BPM: 60  Clinical impression: abnormal ECG  Comments: Indication: Coronary disease, paroxysmal atrial fibrillation.    Atrially and ventricularly paced.              Assessment:       Diagnosis Plan   1. Coronary artery disease involving native coronary artery of native heart without angina pectoris  " ECG 12 Lead    Ambulatory Referral to Cardiac Rehab   2. Complete heart block (CMS/HCC)  ECG 12 Lead   3. S/P biventricular cardiac pacemaker procedure  ECG 12 Lead   4. Paroxysmal atrial fibrillation (CMS/HCC)  ECG 12 Lead     Orders Placed This Encounter   Procedures   • Ambulatory Referral to Cardiac Rehab     Referral Priority:   Routine     Referral Type:   Rehabilitation - Outpatient     Requested Specialty:   Cardiac Rehabilitation     Number of Visits Requested:   1   • ECG 12 Lead     This order was created via procedure documentation          Plan:       1.  Coronary Artery Disease  Assessment  • The patient has no angina  • There is a new diagnosis of stable angina in the past 12 months  • The patient is having symptoms consistent with unstable angina     Plan  • Lifestyle modifications discussed include adhering to a heart healthy diet, medication compliance and regular exercise    Subjective - Objective  • There is a history of past MI  • There has been a previous stent procedure using SHERRI  • Current antiplatelet therapy includes aspirin 81 mg and clopidogrel 75 mg  • The patient qualifies for cardiac rehabilitation, and has been referred to cardiac rehab  • Overall he's doing great.  He is not having any issues with aspirin and Plavix despite the anemia that he had in the hospital.  I'm going to get him signed up for cardiac rehabilitation at [facility].    2.  Atrial Fibrillation and Atrial Flutter  Assessment  • The patient has paroxysmal atrial fibrillation  • This is non-valvular in etiology  • The patient's CHADS2-VASc score is 5  • A [id number] score of 2 or more is considered a high risk for a thromboembolic event  • Aspirin prescribed    Plan  • Attempt to maintain sinus rhythm  • Continue aspirin + clopidogrel for antithrombotic therapy, bleeding issues discussed  • Continue sotalol for rhythm control    Subjective - Objective  • The average duration of atrial fibrillation episodes is  <48 hours  • He had been on sotalol for atrial fibrillation.  This was discontinued when he was found to be bradycardic, which in actuality was complete heart block.  He underwent permanent pacemaker implantation and his sotalol was restarted.  He then converted back to sinus rhythm.  He is atrially and ventricularly paced today, no evidence of atrial fibrillation.  He is not the greatest candidate for dual antiplatelet therapy considering his metastatic prostate cancer as well as his anemia.  We will just keep him on aspirin and Plavix and in sinus rhythm on sotalol.    3.  Complete heart block, status post pacemaker implantation.  This is functioning well.  It was interrogated today, Dr. Ruth did review the 12-lead ECG and was happy with the results.    As always, it has been a pleasure to participate in your patient's care.      Sincerely,         Liliana Larson PA-C

## 2018-08-30 ENCOUNTER — READMISSION MANAGEMENT (OUTPATIENT)
Dept: CALL CENTER | Facility: HOSPITAL | Age: 78
End: 2018-08-30

## 2018-08-30 NOTE — OUTREACH NOTE
Medical Week 2 Survey      Responses   Facility patient discharged from?  [place]   Does the patient have one of the following disease processes/diagnoses(primary or secondary)?  Other   Week 2 attempt successful?  No   Unsuccessful attempts  Attempt 1          Yanelis Ansari RN

## 2018-09-01 ENCOUNTER — READMISSION MANAGEMENT (OUTPATIENT)
Dept: CALL CENTER | Facility: HOSPITAL | Age: 78
End: 2018-09-01

## 2018-09-01 NOTE — OUTREACH NOTE
Medical Week 2 Survey      Responses   Facility patient discharged from?  [place]   Does the patient have one of the following disease processes/diagnoses(primary or secondary)?  Other   Week 2 attempt successful?  Yes   Call start time  1452   Discharge diagnosis  Coronary artery disease involving native coronary artery of native heart with unstable angina pectoris (CMS/[facility]) I25.110   Call end time  1455   Meds reviewed with patient/caregiver?  Yes   Is the patient taking all medications as directed (includes completed medication regime)?  Yes   Has the patient kept scheduled appointments due by today?  Yes   Psychosocial issues?  No   What is the patient's perception of their health status since discharge?  Improving   Additional teach back comments  pt doing well with meds and pacemaker as well as wrist site look good. He will start therapy in a few weeks once his stamina returns.    Week 2 Call Completed?  Yes          Alea Hart RN

## 2018-09-10 ENCOUNTER — CLINICAL SUPPORT NO REQUIREMENTS (OUTPATIENT)
Dept: CARDIOLOGY | Facility: CLINIC | Age: 78
End: 2018-09-10

## 2018-09-10 DIAGNOSIS — I44.2 COMPLETE HEART BLOCK (HCC): Primary | ICD-10-CM

## 2018-09-13 ENCOUNTER — READMISSION MANAGEMENT (OUTPATIENT)
Dept: CALL CENTER | Facility: HOSPITAL | Age: 78
End: 2018-09-13

## 2018-09-13 NOTE — OUTREACH NOTE
Medical Week 3 Survey      Responses   Facility patient discharged from?  [place]   Does the patient have one of the following disease processes/diagnoses(primary or secondary)?  Other   Week 3 attempt successful?  No   Unsuccessful attempts  Attempt 1          Alea Hart RN

## 2018-09-16 ENCOUNTER — READMISSION MANAGEMENT (OUTPATIENT)
Dept: CALL CENTER | Facility: HOSPITAL | Age: 78
End: 2018-09-16

## 2018-09-16 NOTE — OUTREACH NOTE
Medical Week 3 Survey      Responses   Facility patient discharged from?  [place]   Does the patient have one of the following disease processes/diagnoses(primary or secondary)?  Other   Week 3 attempt successful?  No   Unsuccessful attempts  Attempt 2          Yanelis Ansari RN

## 2018-09-25 ENCOUNTER — OFFICE VISIT (OUTPATIENT)
Dept: CARDIOLOGY | Facility: CLINIC | Age: 78
End: 2018-09-25

## 2018-09-25 ENCOUNTER — LAB (OUTPATIENT)
Dept: LAB | Facility: HOSPITAL | Age: 78
End: 2018-09-25

## 2018-09-25 VITALS
SYSTOLIC BLOOD PRESSURE: 158 MMHG | BODY MASS INDEX: 32.35 KG/M2 | HEIGHT: 70 IN | HEART RATE: 61 BPM | WEIGHT: 226 LBS | DIASTOLIC BLOOD PRESSURE: 68 MMHG

## 2018-09-25 DIAGNOSIS — I48.0 PAROXYSMAL ATRIAL FIBRILLATION (HCC): ICD-10-CM

## 2018-09-25 DIAGNOSIS — R06.09 DYSPNEA ON EXERTION: ICD-10-CM

## 2018-09-25 DIAGNOSIS — Z95.0 S/P BIVENTRICULAR CARDIAC PACEMAKER PROCEDURE: Primary | ICD-10-CM

## 2018-09-25 DIAGNOSIS — Z95.0 S/P BIVENTRICULAR CARDIAC PACEMAKER PROCEDURE: ICD-10-CM

## 2018-09-25 DIAGNOSIS — I25.10 CORONARY ARTERY DISEASE INVOLVING NATIVE CORONARY ARTERY OF NATIVE HEART WITHOUT ANGINA PECTORIS: ICD-10-CM

## 2018-09-25 DIAGNOSIS — R60.9 EDEMA, UNSPECIFIED TYPE: ICD-10-CM

## 2018-09-25 LAB
ALBUMIN SERPL-MCNC: 3.7 G/DL (ref 3.5–5.2)
ALBUMIN/GLOB SERPL: 1.3 G/DL
ALP SERPL-CCNC: 65 U/L (ref 39–117)
ALT SERPL W P-5'-P-CCNC: 15 U/L (ref 1–41)
ANION GAP SERPL CALCULATED.3IONS-SCNC: 11 MMOL/L
AST SERPL-CCNC: 17 U/L (ref 1–40)
BASOPHILS # BLD AUTO: 0.01 10*3/MM3 (ref 0–0.2)
BASOPHILS NFR BLD AUTO: 0.3 % (ref 0–1.5)
BILIRUB SERPL-MCNC: 0.2 MG/DL (ref 0.1–1.2)
BUN BLD-MCNC: 27 MG/DL (ref 8–23)
BUN/CREAT SERPL: 17.4 (ref 7–25)
CALCIUM SPEC-SCNC: 8.9 MG/DL (ref 8.6–10.5)
CHLORIDE SERPL-SCNC: 102 MMOL/L (ref 98–107)
CO2 SERPL-SCNC: 25 MMOL/L (ref 22–29)
CREAT BLD-MCNC: 1.55 MG/DL (ref 0.76–1.27)
DEPRECATED RDW RBC AUTO: 51.2 FL (ref 37–54)
EOSINOPHIL # BLD AUTO: 0.19 10*3/MM3 (ref 0–0.7)
EOSINOPHIL NFR BLD AUTO: 5.2 % (ref 0.3–6.2)
ERYTHROCYTE [DISTWIDTH] IN BLOOD BY AUTOMATED COUNT: 15.4 % (ref 11.5–14.5)
GFR SERPL CREATININE-BSD FRML MDRD: 44 ML/MIN/1.73
GLOBULIN UR ELPH-MCNC: 2.8 GM/DL
GLUCOSE BLD-MCNC: 300 MG/DL (ref 65–99)
HCT VFR BLD AUTO: 30.5 % (ref 40.4–52.2)
HGB BLD-MCNC: 9.2 G/DL (ref 13.7–17.6)
IMM GRANULOCYTES # BLD: 0 10*3/MM3 (ref 0–0.03)
IMM GRANULOCYTES NFR BLD: 0 % (ref 0–0.5)
LYMPHOCYTES # BLD AUTO: 0.95 10*3/MM3 (ref 0.9–4.8)
LYMPHOCYTES NFR BLD AUTO: 26 % (ref 19.6–45.3)
MCH RBC QN AUTO: 27.5 PG (ref 27–32.7)
MCHC RBC AUTO-ENTMCNC: 30.2 G/DL (ref 32.6–36.4)
MCV RBC AUTO: 91.3 FL (ref 79.8–96.2)
MONOCYTES # BLD AUTO: 0.31 10*3/MM3 (ref 0.2–1.2)
MONOCYTES NFR BLD AUTO: 8.5 % (ref 5–12)
NEUTROPHILS # BLD AUTO: 2.19 10*3/MM3 (ref 1.9–8.1)
NEUTROPHILS NFR BLD AUTO: 60 % (ref 42.7–76)
NT-PROBNP SERPL-MCNC: 2422 PG/ML (ref 0–1800)
PLATELET # BLD AUTO: 156 10*3/MM3 (ref 140–500)
PMV BLD AUTO: 9.8 FL (ref 6–12)
POTASSIUM BLD-SCNC: 4.3 MMOL/L (ref 3.5–5.2)
PROT SERPL-MCNC: 6.5 G/DL (ref 6–8.5)
RBC # BLD AUTO: 3.34 10*6/MM3 (ref 4.6–6)
SODIUM BLD-SCNC: 138 MMOL/L (ref 136–145)
WBC NRBC COR # BLD: 3.65 10*3/MM3 (ref 4.5–10.7)

## 2018-09-25 PROCEDURE — 99214 OFFICE O/P EST MOD 30 MIN: CPT | Performed by: INTERNAL MEDICINE

## 2018-09-25 PROCEDURE — 85025 COMPLETE CBC W/AUTO DIFF WBC: CPT

## 2018-09-25 PROCEDURE — 83880 ASSAY OF NATRIURETIC PEPTIDE: CPT

## 2018-09-25 PROCEDURE — 36415 COLL VENOUS BLD VENIPUNCTURE: CPT

## 2018-09-25 PROCEDURE — 93000 ELECTROCARDIOGRAM COMPLETE: CPT | Performed by: INTERNAL MEDICINE

## 2018-09-25 PROCEDURE — 80053 COMPREHEN METABOLIC PANEL: CPT

## 2018-09-25 NOTE — PROGRESS NOTES
Date of Office Visit: 2018  Encounter Provider: Rahul Simpson MD  Place of Service: [facility] CARDIOLOGY  Patient Name: Ruben Milian  :1940  [id number]    Chief Complaint   Patient presents with   • Coronary Artery Disease   :     HPI: Ruben Milian is a 78 y.o. male  He is a en who has metastatic prostate cancer and was sent up with a non-ST-elevation myocardial infarction.  They had done a catheterization on him at  and they found that he had severe two-vessel disease.  He also had complete heart block so what we did was we put a CRT device in him, not an ICD, for his complete heart block because he had left bundle branch block and he had an ejection fraction of about 40%.  We then put him on sotalol to keep him in a normal rhythm.  I wondered about whether he had a type 2 non-STEMI from his atrial fibrillation and then we put drug-eluting stents in his left anterior descending and circumflex.  He had some disease in the diagonal that we left along, but there was not really any significant disease in his right.  He gets a little short of breath at times.  He has a little bit of edema.  He denies paroxysmal nocturnal dyspnea and denies orthopnea.  He is on amlodipine.  He has not noticed a change in his weight and has no chest pain and no palpitations.        Past Medical History:   Diagnosis Date   • Anemia    • Cancer (CMS/HCC)     prostate   • Cardiomyopathy (CMS/HCC)    • Chronic kidney disease    • Complete heart block (CMS/HCC)    • Diabetes (CMS/HCC)    • Diabetic retinopathy (CMS/HCC)    • H/O angioplasty    • LBBB (left bundle branch block)    • Pancytopenia (CMS/HCC)    • Peptic ulcer        Past Surgical History:   Procedure Laterality Date   • AMPUTATION FINGER / THUMB     • APPENDECTOMY     • CARDIAC CATHETERIZATION N/A 2018    Procedure: Stent SHERRI coronary;  Surgeon: Rahul Simpson MD;  Location: [facility] INVASIVE  LOCATION;  Service: Cardiology   • CARDIAC ELECTROPHYSIOLOGY PROCEDURE N/A 8/10/2018    Procedure: Biventricular Device Insertion- Pacemaker;  Surgeon: Shan Castano MD;  Location:  [facility] CATH INVASIVE LOCATION;  Service: Cardiology   • [place] RT/LT HEART CATHETERS N/A 8/13/2018    Procedure: Percutaneous Coronary Intervention;  Surgeon: Rahul Simpson MD;  Location:  [facility] CATH INVASIVE LOCATION;  Service: Cardiology       Social History     Social History   • Marital status:      Spouse name: N/A   • Number of children: N/A   • Years of education: N/A     Occupational History   • Not on file.     Social History Main Topics   • Smoking status: Former Smoker     Packs/day: 1.00     Types: Cigarettes     Start date: 1/1/1963     Quit date: 1/1/1975   • Smokeless tobacco: Never Used   • Alcohol use No   • Drug use: No   • Sexual activity: Defer     Other Topics Concern   • Not on file     Social History Narrative   • No narrative on file       Family History   Problem Relation Age of Onset   • Heart disease Mother    • Heart disease Sister    • No Known Problems Father    • No Known Problems Maternal Grandmother    • No Known Problems Maternal Grandfather    • No Known Problems Paternal Grandmother    • No Known Problems Paternal Grandfather        Review of Systems   Constitution: Negative for decreased appetite, fever, malaise/fatigue and weight loss.   HENT: Negative for nosebleeds.    Eyes: Negative for double vision.   Cardiovascular: Negative for chest pain, claudication, cyanosis, dyspnea on exertion, irregular heartbeat, leg swelling, near-syncope, orthopnea, palpitations, paroxysmal nocturnal dyspnea and syncope.   Respiratory: Negative for cough, hemoptysis and shortness of breath.    Hematologic/Lymphatic: Negative for bleeding problem.   Skin: Negative for rash.   Musculoskeletal: Negative for falls and myalgias.   Gastrointestinal: Negative for hematochezia, jaundice, melena, nausea and  "vomiting.   Genitourinary: Negative for hematuria.   Neurological: Negative for dizziness and seizures.   Psychiatric/Behavioral: Negative for altered mental status and memory loss.       No Known Allergies      Current Outpatient Prescriptions:   •  amLODIPine (NORVASC) 5 MG tablet, Take 1 tablet by mouth Daily., Disp: 30 tablet, Rfl: 11  •  aspirin 81 MG EC tablet, Take 81 mg by mouth Daily., Disp: , Rfl:   •  atorvastatin (LIPITOR) 40 MG tablet, Take 1 tablet by mouth Every Night., Disp: 30 tablet, Rfl: 11  •  calcium carbonate (OS-BLAISE) 600 MG tablet, Take 600 mg by mouth 2 (Two) Times a Day With Meals., Disp: , Rfl:   •  chlorthalidone (HYGROTON) 25 MG tablet, Take 1 tablet by mouth Daily., Disp: 30 tablet, Rfl: 11  •  cholecalciferol (VITAMIN D3) 1000 units tablet, Take 1,000 Units by mouth Daily., Disp: , Rfl:   •  clopidogrel (PLAVIX) 75 MG tablet, Take 1 tablet by mouth Daily., Disp: 30 tablet, Rfl: 11  •  cyanocobalamin (VITAMIN B-12) 500 MCG tablet, Take 1 tablet by mouth Daily., Disp: 30 tablet, Rfl: 11  •  glipiZIDE (GLUCOTROL) 10 MG tablet, Take 10 mg by mouth 2 (Two) Times a Day., Disp: , Rfl:   •  insulin glargine (LANTUS) 100 UNIT/ML injection, Inject 40 Units under the skin into the appropriate area as directed Every Night., Disp: , Rfl:   •  losartan (COZAAR) 50 MG tablet, Take 1 tablet by mouth Daily., Disp: 30 tablet, Rfl: 11  •  pantoprazole (PROTONIX) 40 MG EC tablet, Take 1 tablet by mouth Daily., Disp: 30 tablet, Rfl: 11  •  sotalol (BETAPACE) 80 MG tablet, Take 1 tablet by mouth Every 12 (Twelve) Hours., Disp: 30 tablet, Rfl: 11      Objective:     Vitals:    09/25/18 1543   BP: 158/68   Pulse: 61   Weight: 103 kg (226 lb)   Height: 177.8 cm (70\")     Body mass index is 32.43 kg/m².    Physical Exam   Constitutional: He is oriented to person, place, and time. He appears well-developed and well-nourished.   HENT:   Head: Normocephalic.   Eyes: No scleral icterus.   Neck: No JVD present. No " thyromegaly present.   Cardiovascular: Normal rate, regular rhythm and normal heart sounds.  Exam reveals no gallop and no friction rub.    No murmur heard.  Pulmonary/Chest: Effort normal and breath sounds normal. He has no wheezes. He has no rales.       Abdominal: Soft. There is no hepatosplenomegaly. There is no tenderness.   Musculoskeletal: Normal range of motion. He exhibits edema.   Lymphadenopathy:     He has no cervical adenopathy.   Neurological: He is alert and oriented to person, place, and time.   Skin: Skin is warm and dry. No rash noted.   Psychiatric: He has a normal mood and affect.         ECG 12 Lead  Date/Time: 9/25/2018 4:14 PM  Performed by: PRASHANTH DYER  Authorized by: PRASHANTH DYER   Comparison: compared with previous ECG   Similar to previous ECG  Rhythm: sinus rhythm and paced             Assessment:       Diagnosis Plan   1. S/P biventricular cardiac pacemaker procedure  Comprehensive Metabolic Panel    CBC & Differential    proBNP   2. Paroxysmal atrial fibrillation (CMS/HCC)  Comprehensive Metabolic Panel    CBC & Differential    proBNP   3. Coronary artery disease involving native coronary artery of native heart without angina pectoris  Comprehensive Metabolic Panel    CBC & Differential    proBNP   4. Dyspnea on exertion  Comprehensive Metabolic Panel    CBC & Differential    proBNP   5. Edema, unspecified type  Comprehensive Metabolic Panel    CBC & Differential    proBNP          Plan:       In general, I think he is doing well.  He has not had any atrial fibrillation.  I do not think he is in heart failure but I am going to ask that, when he comes back next week, to get some blood work.  I am going to continue him on his other medications.  If he is in heart failure then we will increase his diuretic.  I am going to have him see EMILIANA Olea in six months and see me in a year.    Coronary Artery Disease  Assessment  • The patient has no angina  • There is a new diagnosis  of stable angina in the past 12 months    Plan  • Lifestyle modifications discussed include adhering to a heart healthy diet, medication compliance, regular exercise and regular monitoring of cholesterol and blood pressure    Subjective - Objective  • There has been a previous stent procedure using SHERRI  • Current antiplatelet therapy includes aspirin 81 mg and clopidogrel 75 mg    Atrial Fibrillation and Atrial Flutter  Assessment  • The patient has paroxysmal atrial fibrillation  • This is non-valvular in etiology  • The patient's CHADS2-VASc score is 5  • A [id number] score of 2 or more is considered a high risk for a thromboembolic event      As always, it has been a pleasure to participate in your patient's care.      Sincerely,       Rahul Simpson MD

## 2018-10-02 ENCOUNTER — OFFICE VISIT (OUTPATIENT)
Dept: CARDIOLOGY | Facility: CLINIC | Age: 78
End: 2018-10-02

## 2018-10-02 ENCOUNTER — CLINICAL SUPPORT NO REQUIREMENTS (OUTPATIENT)
Dept: CARDIOLOGY | Facility: CLINIC | Age: 78
End: 2018-10-02

## 2018-10-02 VITALS
SYSTOLIC BLOOD PRESSURE: 128 MMHG | HEIGHT: 72 IN | WEIGHT: 224 LBS | BODY MASS INDEX: 30.34 KG/M2 | HEART RATE: 60 BPM | DIASTOLIC BLOOD PRESSURE: 54 MMHG

## 2018-10-02 DIAGNOSIS — I44.2 COMPLETE HEART BLOCK (HCC): Primary | ICD-10-CM

## 2018-10-02 DIAGNOSIS — Z95.0 S/P BIVENTRICULAR CARDIAC PACEMAKER PROCEDURE: ICD-10-CM

## 2018-10-02 DIAGNOSIS — I48.0 PAROXYSMAL ATRIAL FIBRILLATION (HCC): ICD-10-CM

## 2018-10-02 PROCEDURE — 99024 POSTOP FOLLOW-UP VISIT: CPT | Performed by: INTERNAL MEDICINE

## 2018-10-02 PROCEDURE — 93000 ELECTROCARDIOGRAM COMPLETE: CPT | Performed by: INTERNAL MEDICINE

## 2018-10-02 NOTE — PROGRESS NOTES
Date of Office Visit: 10/02/2018  Encounter Provider: Shan Castano MD  Place of Service: [facility] CARDIOLOGY  Patient Name: Ruben Milian  :1940    Chief Complaint   Patient presents with   • Atrial Fibrillation     New Patient Consult / WD ref   • Pacemaker Check   :     HPI: Ruben Milian is a 78 y.o. male who presents today for follow-up of biventricular pacemaker and atrial fibrillation.  The patient reports no great improvement in energy or fatigue since discharge.  No issues with incision.  He has not noted any episodes of tachycardia or palpitations since discharge.          Past Medical History:   Diagnosis Date   • Anemia    • Cancer (CMS/HCC)     prostate   • Cardiomyopathy (CMS/HCC)    • Chronic kidney disease    • Complete heart block (CMS/HCC)    • Diabetes (CMS/HCC)    • Diabetic retinopathy (CMS/HCC)    • H/O angioplasty    • LBBB (left bundle branch block)    • Pancytopenia (CMS/HCC)    • Peptic ulcer        Past Surgical History:   Procedure Laterality Date   • AMPUTATION FINGER / THUMB     • APPENDECTOMY     • CARDIAC CATHETERIZATION N/A 2018    Procedure: Stent SHERRI coronary;  Surgeon: Rahul Simpson MD;  Location: [facility] CATH INVASIVE LOCATION;  Service: Cardiology   • CARDIAC ELECTROPHYSIOLOGY PROCEDURE N/A 8/10/2018    Procedure: Biventricular Device Insertion- Pacemaker;  Surgeon: Shan Castano MD;  Location: [facility] CATH INVASIVE LOCATION;  Service: Cardiology   • WA RT/LT HEART CATHETERS N/A 2018    Procedure: Percutaneous Coronary Intervention;  Surgeon: Rahul Simpson MD;  Location: [facility] CATH INVASIVE LOCATION;  Service: Cardiology       Social History     Social History   • Marital status:      Spouse name: N/A   • Number of children: N/A   • Years of education: N/A     Occupational History   • Not on file.     Social History Main Topics   • Smoking status: Former Smoker     Packs/day: 1.00     Types: Cigarettes      Start date: 1/1/1963     Quit date: 1/1/1975   • Smokeless tobacco: Never Used   • Alcohol use No   • Drug use: No   • Sexual activity: Defer     Other Topics Concern   • Not on file     Social History Narrative   • No narrative on file       Family History   Problem Relation Age of Onset   • Heart disease Mother    • Heart disease Sister    • No Known Problems Father    • No Known Problems Maternal Grandmother    • No Known Problems Maternal Grandfather    • No Known Problems Paternal Grandmother    • No Known Problems Paternal Grandfather        Review of Systems   Constitution: Positive for malaise/fatigue. Negative for weakness.   HENT: Negative.    Eyes: Negative.    Cardiovascular: Positive for dyspnea on exertion. Negative for chest pain, leg swelling and near-syncope.   Respiratory: Negative for cough and shortness of breath.    Endocrine: Negative.    Hematologic/Lymphatic: Negative.    Skin: Negative.    Musculoskeletal: Negative.    Gastrointestinal: Negative.    Genitourinary: Negative.    Neurological: Negative.    Psychiatric/Behavioral: Negative.    Allergic/Immunologic: Negative.        No Known Allergies      Current Outpatient Prescriptions:   •  amLODIPine (NORVASC) 5 MG tablet, Take 1 tablet by mouth Daily., Disp: 30 tablet, Rfl: 11  •  aspirin 81 MG EC tablet, Take 81 mg by mouth Daily., Disp: , Rfl:   •  atorvastatin (LIPITOR) 40 MG tablet, Take 1 tablet by mouth Every Night., Disp: 30 tablet, Rfl: 11  •  calcium carbonate (OS-BLAISE) 600 MG tablet, Take 600 mg by mouth 2 (Two) Times a Day With Meals., Disp: , Rfl:   •  chlorthalidone (HYGROTON) 25 MG tablet, Take 1 tablet by mouth Daily., Disp: 30 tablet, Rfl: 11  •  cholecalciferol (VITAMIN D3) 1000 units tablet, Take 1,000 Units by mouth Daily., Disp: , Rfl:   •  clopidogrel (PLAVIX) 75 MG tablet, Take 1 tablet by mouth Daily., Disp: 30 tablet, Rfl: 11  •  cyanocobalamin (VITAMIN B-12) 500 MCG tablet, Take 1 tablet by mouth Daily., Disp: 30  "tablet, Rfl: 11  •  glipiZIDE (GLUCOTROL) 10 MG tablet, Take 10 mg by mouth 2 (Two) Times a Day., Disp: , Rfl:   •  insulin glargine (LANTUS) 100 UNIT/ML injection, Inject 40 Units under the skin into the appropriate area as directed Every Night., Disp: , Rfl:   •  losartan (COZAAR) 50 MG tablet, Take 1 tablet by mouth Daily., Disp: 30 tablet, Rfl: 11  •  pantoprazole (PROTONIX) 40 MG EC tablet, Take 1 tablet by mouth Daily., Disp: 30 tablet, Rfl: 11  •  sotalol (BETAPACE) 80 MG tablet, Take 1 tablet by mouth Every 12 (Twelve) Hours., Disp: 30 tablet, Rfl: 11      Objective:     Vitals:    10/02/18 1028   BP: 128/54   Pulse: 60   Weight: 102 kg (224 lb)   Height: 182.9 cm (72\")     Body mass index is 30.38 kg/m².    PHYSICAL EXAM:    Physical Exam   Constitutional: He is oriented to person, place, and time. He appears well-developed. No distress.   HENT:   Head: Normocephalic and atraumatic.   Right Ear: External ear normal.   Left Ear: External ear normal.   Eyes: Conjunctivae are normal. Right eye exhibits no discharge. Left eye exhibits no discharge.   Neck: Neck supple. No JVD present.   Cardiovascular: Normal rate and normal heart sounds.    Pacemaker in place on left chest, well healed, no edema   Pulmonary/Chest: Effort normal and breath sounds normal. No respiratory distress.   Abdominal: Soft. Bowel sounds are normal. He exhibits no distension. There is no tenderness.   Musculoskeletal: Normal range of motion. He exhibits no edema.   Neurological: He is alert and oriented to person, place, and time. No cranial nerve deficit.   Skin: Skin is warm and dry. He is not diaphoretic.   Psychiatric: He has a normal mood and affect. His behavior is normal. Thought content normal.   Nursing note and vitals reviewed.          ECG 12 Lead  Date/Time: 10/2/2018 6:00 PM  Performed by: WADE SIMMONS  Authorized by: WADE SIMMONS                 Assessment:       Diagnosis Plan   1. Complete heart block " (CMS/[facility])     2. Paroxysmal atrial fibrillation (CMS/[facility])     3. S/P biventricular cardiac pacemaker procedure            Plan:       Patient doing well, but no signficant improvement in symptoms.  Adjustment made to pacemaker today, hopefully will improve symptoms. Incision is well healed, without signs of infection.  I will see him back in six months to assess for improvement    As always, it has been a pleasure to participate in your patient's care.      Sincerely,         Shan Castano MD

## 2018-10-08 RX ORDER — ATORVASTATIN CALCIUM 40 MG/1
40 TABLET, FILM COATED ORAL NIGHTLY
Qty: 90 TABLET | Refills: 2 | Status: SHIPPED | OUTPATIENT
Start: 2018-10-08

## 2018-10-08 RX ORDER — AMLODIPINE BESYLATE 5 MG/1
5 TABLET ORAL DAILY
Qty: 90 TABLET | Refills: 2 | Status: SHIPPED | OUTPATIENT
Start: 2018-10-08

## 2018-10-08 RX ORDER — LOSARTAN POTASSIUM 50 MG/1
50 TABLET ORAL
Qty: 90 TABLET | Refills: 2 | Status: SHIPPED | OUTPATIENT
Start: 2018-10-08

## 2018-10-08 RX ORDER — PANTOPRAZOLE SODIUM 40 MG/1
40 TABLET, DELAYED RELEASE ORAL DAILY
Qty: 90 TABLET | Refills: 2 | Status: SHIPPED | OUTPATIENT
Start: 2018-10-08

## 2018-10-08 RX ORDER — CLOPIDOGREL BISULFATE 75 MG/1
75 TABLET ORAL DAILY
Qty: 90 TABLET | Refills: 2 | Status: SHIPPED | OUTPATIENT
Start: 2018-10-08

## 2018-11-13 ENCOUNTER — CLINICAL SUPPORT NO REQUIREMENTS (OUTPATIENT)
Dept: CARDIOLOGY | Facility: CLINIC | Age: 78
End: 2018-11-13

## 2018-11-13 DIAGNOSIS — I50.9 CONGESTIVE HEART FAILURE, UNSPECIFIED HF CHRONICITY, UNSPECIFIED HEART FAILURE TYPE (HCC): Primary | ICD-10-CM

## 2018-11-13 PROCEDURE — 93281 PM DEVICE PROGR EVAL MULTI: CPT | Performed by: INTERNAL MEDICINE

## 2018-11-14 ENCOUNTER — OFFICE VISIT CONVERTED (OUTPATIENT)
Dept: CARDIOLOGY | Facility: CLINIC | Age: 78
End: 2018-11-14
Attending: NURSE PRACTITIONER

## 2018-11-14 ENCOUNTER — CONVERSION ENCOUNTER (OUTPATIENT)
Dept: CARDIOLOGY | Facility: CLINIC | Age: 78
End: 2018-11-14

## 2018-11-15 ENCOUNTER — OFFICE VISIT CONVERTED (OUTPATIENT)
Dept: FAMILY MEDICINE CLINIC | Facility: CLINIC | Age: 78
End: 2018-11-15
Attending: NURSE PRACTITIONER

## 2018-11-20 ENCOUNTER — CONVERSION ENCOUNTER (OUTPATIENT)
Dept: FAMILY MEDICINE CLINIC | Facility: CLINIC | Age: 78
End: 2018-11-20

## 2018-11-20 ENCOUNTER — OFFICE VISIT CONVERTED (OUTPATIENT)
Dept: FAMILY MEDICINE CLINIC | Facility: CLINIC | Age: 78
End: 2018-11-20
Attending: NURSE PRACTITIONER

## 2018-11-21 ENCOUNTER — OFFICE VISIT CONVERTED (OUTPATIENT)
Dept: FAMILY MEDICINE CLINIC | Facility: CLINIC | Age: 78
End: 2018-11-21
Attending: NURSE PRACTITIONER

## 2018-11-27 ENCOUNTER — OFFICE VISIT CONVERTED (OUTPATIENT)
Dept: FAMILY MEDICINE CLINIC | Facility: CLINIC | Age: 78
End: 2018-11-27
Attending: NURSE PRACTITIONER

## 2018-12-04 ENCOUNTER — TELEPHONE (OUTPATIENT)
Dept: CARDIOLOGY | Facility: CLINIC | Age: 78
End: 2018-12-04

## 2018-12-04 NOTE — TELEPHONE ENCOUNTER
I spoke with the pt. And he will resume care with [facility] in [place].  Please cancel ALL appts. With us.  Thanks. Megan

## 2018-12-10 RX ORDER — SOTALOL HYDROCHLORIDE 80 MG/1
80 TABLET ORAL 2 TIMES DAILY
Qty: 180 TABLET | Refills: 2 | Status: SHIPPED | OUTPATIENT
Start: 2018-12-10

## 2018-12-21 ENCOUNTER — OFFICE VISIT CONVERTED (OUTPATIENT)
Dept: ONCOLOGY | Facility: HOSPITAL | Age: 78
End: 2018-12-21
Attending: INTERNAL MEDICINE

## 2019-01-02 ENCOUNTER — OFFICE VISIT CONVERTED (OUTPATIENT)
Dept: ONCOLOGY | Facility: HOSPITAL | Age: 79
End: 2019-01-02
Attending: INTERNAL MEDICINE

## 2019-01-02 ENCOUNTER — OFFICE VISIT CONVERTED (OUTPATIENT)
Dept: SURGERY | Facility: CLINIC | Age: 79
End: 2019-01-02
Attending: SURGERY

## 2019-01-02 ENCOUNTER — HOSPITAL ENCOUNTER (OUTPATIENT)
Dept: CARDIOLOGY | Facility: HOSPITAL | Age: 79
Discharge: HOME | End: 2019-01-02
Attending: INTERNAL MEDICINE

## 2019-01-02 ENCOUNTER — HOSPITAL ENCOUNTER (OUTPATIENT)
Dept: OTHER | Facility: HOSPITAL | Age: 79
Discharge: HOME | End: 2019-01-02
Attending: INTERNAL MEDICINE

## 2019-01-02 LAB
ALBUMIN SERPL-MCNC: 3.2 G/DL (ref 3.5–5)
ALBUMIN/GLOB SERPL: 0.8 {RATIO} (ref 1.4–2.6)
ALP SERPL-CCNC: 64 U/L (ref 56–155)
ALT SERPL-CCNC: 16 U/L (ref 10–40)
ANION GAP SERPL CALC-SCNC: 20 MMOL/L (ref 8–19)
AST SERPL-CCNC: 42 U/L (ref 15–50)
BASOPHILS # BLD AUTO: 0.02 10*3/UL (ref 0–0.2)
BASOPHILS NFR BLD AUTO: 0.43 % (ref 0–3)
BILIRUB SERPL-MCNC: 0.27 MG/DL (ref 0.2–1.3)
BUN SERPL-MCNC: 30 MG/DL (ref 5–25)
BUN/CREAT SERPL: 18 {RATIO} (ref 6–20)
CALCIUM SERPL-MCNC: 11.1 MG/DL (ref 8.7–10.4)
CHLORIDE SERPL-SCNC: 100 MMOL/L (ref 99–111)
CONV CO2: 22 MMOL/L (ref 22–32)
CONV TOTAL PROTEIN: 7 G/DL (ref 6.3–8.2)
CREAT UR-MCNC: 1.7 MG/DL (ref 0.7–1.2)
EOSINOPHIL # BLD AUTO: 0.08 10*3/UL (ref 0–0.7)
EOSINOPHIL # BLD AUTO: 2.08 % (ref 0–7)
ERYTHROCYTE [DISTWIDTH] IN BLOOD BY AUTOMATED COUNT: 15.8 % (ref 11.5–14.5)
GFR SERPLBLD BASED ON 1.73 SQ M-ARVRAT: 38 ML/MIN/{1.73_M2}
GLOBULIN UR ELPH-MCNC: 3.8 G/DL (ref 2–3.5)
GLUCOSE SERPL-MCNC: 269 MG/DL (ref 70–99)
HBA1C MFR BLD: 7.81 G/DL (ref 14–18)
HCT VFR BLD AUTO: 22.6 % (ref 42–52)
LDH SERPL-CCNC: 1864 U/L (ref 120–240)
LYMPHOCYTES # BLD AUTO: 1.11 10*3/UL (ref 1–5)
MCH RBC QN AUTO: 29 PG (ref 27–31)
MCHC RBC AUTO-ENTMCNC: 34.5 G/DL (ref 33–37)
MCV RBC AUTO: 83.9 FL (ref 80–96)
MONOCYTES # BLD AUTO: 0.5 10*3/UL (ref 0.2–1.2)
MONOCYTES NFR BLD AUTO: 14 % (ref 3–10)
NEUTROPHILS # BLD AUTO: 1.87 10*3/UL (ref 2–8)
NEUTROPHILS NFR BLD AUTO: 52.3 % (ref 30–85)
NRBC BLD AUTO-RTO: 0 % (ref 0–0.01)
OSMOLALITY SERPL CALC.SUM OF ELEC: 300 MOSM/KG (ref 273–304)
PLATELET # BLD AUTO: 87.4 10*3/UL (ref 130–400)
PMV BLD AUTO: 9.1 FL (ref 7.4–10.4)
POTASSIUM SERPL-SCNC: 4.6 MMOL/L (ref 3.5–5.3)
RBC # BLD AUTO: 2.7 10*6/UL (ref 4.7–6.1)
SODIUM SERPL-SCNC: 137 MMOL/L (ref 135–147)
VARIANT LYMPHS NFR BLD MANUAL: 31.1 % (ref 20–45)
WBC # BLD AUTO: 3.58 10*3/UL (ref 4.8–10.8)

## 2019-01-04 ENCOUNTER — HOSPITAL ENCOUNTER (OUTPATIENT)
Dept: INFUSION THERAPY | Facility: HOSPITAL | Age: 79
Discharge: HOME | End: 2019-01-04
Attending: INTERNAL MEDICINE

## 2019-01-04 LAB
ABO GROUP BLD: NORMAL
BLD GP AB SCN SERPL QL: NORMAL
CONV ABD CONTROL: NORMAL
RH BLD: NORMAL

## 2019-01-08 ENCOUNTER — HOSPITAL ENCOUNTER (OUTPATIENT)
Dept: CT IMAGING | Facility: HOSPITAL | Age: 79
Discharge: HOME | End: 2019-01-08
Attending: INTERNAL MEDICINE

## 2019-01-08 LAB
BASOPHILS # BLD AUTO: 0 10*3/UL (ref 0–0.2)
BASOPHILS # BLD: 0 % (ref 0–3)
BASOPHILS NFR BLD AUTO: 0 % (ref 0–3)
CONV ABS BANDS: 12 % (ref 1–5)
CONV SEGMENTED NEUTROPHILS: 50 % (ref 45–70)
EOSINOPHIL # BLD AUTO: 0.15 10*3/UL (ref 0–0.7)
EOSINOPHIL # BLD AUTO: 5.03 % (ref 0–7)
EOSINOPHIL NFR BLD AUTO: 3 % (ref 0–7)
ERYTHROCYTE [DISTWIDTH] IN BLOOD BY AUTOMATED COUNT: 15.1 % (ref 11.5–14.5)
GIANT PLATELETS: ABNORMAL
HBA1C MFR BLD: 8.89 G/DL (ref 14–18)
HCT VFR BLD AUTO: 25.9 % (ref 42–52)
LARGE PLATELETS: SLIGHT
LYMPHOCYTES # BLD AUTO: 0.64 10*3/UL (ref 1–5)
MCH RBC QN AUTO: 29.3 PG (ref 27–31)
MCHC RBC AUTO-ENTMCNC: 34.3 G/DL (ref 33–37)
MCV RBC AUTO: 85.4 FL (ref 80–96)
MONOCYTES # BLD AUTO: 0.35 10*3/UL (ref 0.2–1.2)
MONOCYTES NFR BLD AUTO: 12 % (ref 3–10)
MONOCYTES NFR BLD MANUAL: 11 % (ref 3–10)
MYELOCYTES TOTAL PER COUNTED LEUKOCYTES BY MANUAL COUN: 1 %
NEUTROPHILS # BLD AUTO: 1.75 10*3/UL (ref 2–8)
NEUTROPHILS NFR BLD AUTO: 60.8 % (ref 30–85)
NRBC BLD AUTO-RTO: 0 % (ref 0–0.01)
NRBC BLD AUTO-RTO: PRESENT %
NUC CELL # PRT MANUAL: 5 /100{WBCS}
PLAT MORPH BLD: ABNORMAL
PLATELET # BLD AUTO: 79.4 10*3/UL (ref 130–400)
PMV BLD AUTO: 9 FL (ref 7.4–10.4)
RBC # BLD AUTO: 3.03 10*6/UL (ref 4.7–6.1)
SMALL PLATELETS BLD QL SMEAR: ADEQUATE
TOXIC GRANULATION: SLIGHT
VARIANT LYMPHS NFR BLD MANUAL: 22.2 % (ref 20–45)
VARIANT LYMPHS NFR BLD MANUAL: 23 % (ref 20–45)
WBC # BLD AUTO: 2.88 10*3/UL (ref 4.8–10.8)

## 2019-01-09 ENCOUNTER — HOSPITAL ENCOUNTER (OUTPATIENT)
Dept: PERIOP | Facility: HOSPITAL | Age: 79
Setting detail: HOSPITAL OUTPATIENT SURGERY
Discharge: HOME | End: 2019-01-09
Attending: SURGERY

## 2019-01-09 LAB
ABO GROUP BLD: NORMAL
BASOPHILS # BLD AUTO: 0.02 10*3/UL (ref 0–0.2)
BASOPHILS NFR BLD AUTO: 0.63 % (ref 0–3)
BLD GP AB SCN SERPL QL: NORMAL
CONV ABD CONTROL: NORMAL
EOSINOPHIL # BLD AUTO: 0.12 10*3/UL (ref 0–0.7)
EOSINOPHIL # BLD AUTO: 3.91 % (ref 0–7)
ERYTHROCYTE [DISTWIDTH] IN BLOOD BY AUTOMATED COUNT: 15.1 % (ref 11.5–14.5)
GLUCOSE BLD-MCNC: 146 MG/DL (ref 70–99)
HBA1C MFR BLD: 7.95 G/DL (ref 14–18)
HCT VFR BLD AUTO: 22.5 % (ref 42–52)
LYMPHOCYTES # BLD AUTO: 0.79 10*3/UL (ref 1–5)
MCH RBC QN AUTO: 29.7 PG (ref 27–31)
MCHC RBC AUTO-ENTMCNC: 35.3 G/DL (ref 33–37)
MCV RBC AUTO: 84.3 FL (ref 80–96)
MONOCYTES # BLD AUTO: 0.47 10*3/UL (ref 0.2–1.2)
MONOCYTES NFR BLD AUTO: 14.7 % (ref 3–10)
NEUTROPHILS # BLD AUTO: 1.77 10*3/UL (ref 2–8)
NEUTROPHILS NFR BLD AUTO: 56 % (ref 30–85)
NRBC BLD AUTO-RTO: 0 % (ref 0–0.01)
PLATELET # BLD AUTO: 69.6 10*3/UL (ref 130–400)
PMV BLD AUTO: 9.2 FL (ref 7.4–10.4)
RBC # BLD AUTO: 2.67 10*6/UL (ref 4.7–6.1)
RH BLD: NORMAL
VARIANT LYMPHS NFR BLD MANUAL: 24.8 % (ref 20–45)
WBC # BLD AUTO: 3.16 10*3/UL (ref 4.8–10.8)

## 2019-01-11 ENCOUNTER — HOSPITAL ENCOUNTER (OUTPATIENT)
Dept: OTHER | Facility: HOSPITAL | Age: 79
Discharge: HOME | End: 2019-01-11
Attending: INTERNAL MEDICINE

## 2019-01-11 ENCOUNTER — OFFICE VISIT CONVERTED (OUTPATIENT)
Dept: ONCOLOGY | Facility: HOSPITAL | Age: 79
End: 2019-01-11
Attending: INTERNAL MEDICINE

## 2019-01-11 LAB
ALBUMIN SERPL-MCNC: 2.6 G/DL (ref 3.5–5)
ALBUMIN/GLOB SERPL: 0.6 {RATIO} (ref 1.4–2.6)
ALP SERPL-CCNC: 58 U/L (ref 56–155)
ALT SERPL-CCNC: 22 U/L (ref 10–40)
ANION GAP SERPL CALC-SCNC: 20 MMOL/L (ref 8–19)
AST SERPL-CCNC: 31 U/L (ref 15–50)
BASOPHILS # BLD AUTO: 0.05 10*3/UL (ref 0–0.2)
BASOPHILS NFR BLD AUTO: 1.67 % (ref 0–3)
BILIRUB SERPL-MCNC: 0.29 MG/DL (ref 0.2–1.3)
BUN SERPL-MCNC: 31 MG/DL (ref 5–25)
BUN/CREAT SERPL: 18 {RATIO} (ref 6–20)
CALCIUM SERPL-MCNC: 9.7 MG/DL (ref 8.7–10.4)
CHLORIDE SERPL-SCNC: 100 MMOL/L (ref 99–111)
CONV CO2: 23 MMOL/L (ref 22–32)
CONV TOTAL PROTEIN: 6.8 G/DL (ref 6.3–8.2)
CREAT UR-MCNC: 1.72 MG/DL (ref 0.7–1.2)
EOSINOPHIL # BLD AUTO: 0.11 10*3/UL (ref 0–0.7)
EOSINOPHIL # BLD AUTO: 3.6 % (ref 0–7)
ERYTHROCYTE [DISTWIDTH] IN BLOOD BY AUTOMATED COUNT: 15.1 % (ref 11.5–14.5)
GFR SERPLBLD BASED ON 1.73 SQ M-ARVRAT: 37 ML/MIN/{1.73_M2}
GLOBULIN UR ELPH-MCNC: 4.2 G/DL (ref 2–3.5)
GLUCOSE SERPL-MCNC: 263 MG/DL (ref 70–99)
HBA1C MFR BLD: 8.16 G/DL (ref 14–18)
HCT VFR BLD AUTO: 23.2 % (ref 42–52)
LDH SERPL-CCNC: 1275 U/L (ref 120–240)
LYMPHOCYTES # BLD AUTO: 0.74 10*3/UL (ref 1–5)
MCH RBC QN AUTO: 29.6 PG (ref 27–31)
MCHC RBC AUTO-ENTMCNC: 35.1 G/DL (ref 33–37)
MCV RBC AUTO: 84.3 FL (ref 80–96)
MONOCYTES # BLD AUTO: 0.49 10*3/UL (ref 0.2–1.2)
MONOCYTES NFR BLD AUTO: 15.7 % (ref 3–10)
NEUTROPHILS # BLD AUTO: 1.73 10*3/UL (ref 2–8)
NEUTROPHILS NFR BLD AUTO: 55.5 % (ref 30–85)
NRBC BLD AUTO-RTO: 0 % (ref 0–0.01)
OSMOLALITY SERPL CALC.SUM OF ELEC: 302 MOSM/KG (ref 273–304)
PLATELET # BLD AUTO: 88.6 10*3/UL (ref 130–400)
PMV BLD AUTO: 8.9 FL (ref 7.4–10.4)
POTASSIUM SERPL-SCNC: 4.8 MMOL/L (ref 3.5–5.3)
RBC # BLD AUTO: 2.75 10*6/UL (ref 4.7–6.1)
SODIUM SERPL-SCNC: 138 MMOL/L (ref 135–147)
VARIANT LYMPHS NFR BLD MANUAL: 23.6 % (ref 20–45)
WBC # BLD AUTO: 3.11 10*3/UL (ref 4.8–10.8)

## 2019-01-14 ENCOUNTER — HOSPITAL ENCOUNTER (OUTPATIENT)
Dept: OTHER | Facility: HOSPITAL | Age: 79
Discharge: HOME | End: 2019-01-14
Attending: INTERNAL MEDICINE

## 2019-01-14 ENCOUNTER — HOSPITAL ENCOUNTER (OUTPATIENT)
Dept: MRI IMAGING | Facility: HOSPITAL | Age: 79
Discharge: HOME | End: 2019-01-14
Attending: INTERNAL MEDICINE

## 2019-01-14 LAB
ABO GROUP BLD: NORMAL
ANION GAP SERPL CALC-SCNC: 15 MMOL/L (ref 8–19)
BASOPHILS # BLD AUTO: 0 10*3/UL (ref 0–0.2)
BASOPHILS NFR BLD AUTO: 0.1 % (ref 0–3)
BLD GP AB SCN SERPL QL: NORMAL
BLOOD GROUP ANTIBODIES SERPL: NORMAL
BUN SERPL-MCNC: 21 MG/DL (ref 5–25)
BUN/CREAT SERPL: 14 {RATIO} (ref 6–20)
CALCIUM SERPL-MCNC: 7.7 MG/DL (ref 8.7–10.4)
CHLORIDE SERPL-SCNC: 102 MMOL/L (ref 99–111)
CONV ABD CONTROL: NORMAL
CONV CO2: 25 MMOL/L (ref 22–32)
CONV LYMPHOMA/LEUKEMIA PROFILE SOLID TISSUE OR FLUID: NORMAL
CREAT UR-MCNC: 1.45 MG/DL (ref 0.7–1.2)
EOSINOPHIL # BLD AUTO: 0.19 10*3/UL (ref 0–0.7)
EOSINOPHIL # BLD AUTO: 5.32 % (ref 0–7)
ERYTHROCYTE [DISTWIDTH] IN BLOOD BY AUTOMATED COUNT: 15.2 % (ref 11.5–14.5)
GFR SERPLBLD BASED ON 1.73 SQ M-ARVRAT: 45 ML/MIN/{1.73_M2}
GLUCOSE SERPL-MCNC: 155 MG/DL (ref 70–99)
HBA1C MFR BLD: 7.82 G/DL (ref 14–18)
HCT VFR BLD AUTO: 23.6 % (ref 42–52)
LYMPHOCYTES # BLD AUTO: 0.83 10*3/UL (ref 1–5)
MCH RBC QN AUTO: 28.8 PG (ref 27–31)
MCHC RBC AUTO-ENTMCNC: 33.1 G/DL (ref 33–37)
MCV RBC AUTO: 86.9 FL (ref 80–96)
MONOCYTES # BLD AUTO: 0.32 10*3/UL (ref 0.2–1.2)
MONOCYTES NFR BLD AUTO: 9.28 % (ref 3–10)
NEUTROPHILS # BLD AUTO: 2.14 10*3/UL (ref 2–8)
NEUTROPHILS NFR BLD AUTO: 61.5 % (ref 30–85)
NRBC BLD AUTO-RTO: 0 % (ref 0–0.01)
OSMOLALITY SERPL CALC.SUM OF ELEC: 292 MOSM/KG (ref 273–304)
PLATELET # BLD AUTO: 83.5 10*3/UL (ref 130–400)
PMV BLD AUTO: 8.2 FL (ref 7.4–10.4)
POTASSIUM SERPL-SCNC: 4.4 MMOL/L (ref 3.5–5.3)
RBC # BLD AUTO: 2.72 10*6/UL (ref 4.7–6.1)
RH BLD: NORMAL
SODIUM SERPL-SCNC: 138 MMOL/L (ref 135–147)
VARIANT LYMPHS NFR BLD MANUAL: 23.8 % (ref 20–45)
WBC # BLD AUTO: 3.48 10*3/UL (ref 4.8–10.8)

## 2019-01-15 ENCOUNTER — HOSPITAL ENCOUNTER (OUTPATIENT)
Dept: INFUSION THERAPY | Facility: HOSPITAL | Age: 79
Discharge: HOME | End: 2019-01-15
Attending: NURSE PRACTITIONER

## 2019-01-15 ENCOUNTER — HOSPITAL ENCOUNTER (OUTPATIENT)
Dept: ONCOLOGY | Facility: HOSPITAL | Age: 79
Setting detail: RECURRING SERIES
Discharge: HOME | End: 2019-01-31
Attending: INTERNAL MEDICINE

## 2019-01-17 LAB
BASOPHILS # BLD AUTO: 0 10*3/UL (ref 0–0.2)
BASOPHILS NFR BLD AUTO: 0 % (ref 0–3)
EOSINOPHIL # BLD AUTO: 0.18 10*3/UL (ref 0–0.7)
EOSINOPHIL # BLD AUTO: 5.8 % (ref 0–7)
ERYTHROCYTE [DISTWIDTH] IN BLOOD BY AUTOMATED COUNT: 14.7 % (ref 11.5–14.5)
HBA1C MFR BLD: 9.52 G/DL (ref 14–18)
HCT VFR BLD AUTO: 26.6 % (ref 42–52)
LYMPHOCYTES # BLD AUTO: 1.01 10*3/UL (ref 1–5)
MCH RBC QN AUTO: 30 PG (ref 27–31)
MCHC RBC AUTO-ENTMCNC: 35.8 G/DL (ref 33–37)
MCV RBC AUTO: 83.7 FL (ref 80–96)
MONOCYTES # BLD AUTO: 0.39 10*3/UL (ref 0.2–1.2)
MONOCYTES NFR BLD AUTO: 12.3 % (ref 3–10)
NEUTROPHILS # BLD AUTO: 1.57 10*3/UL (ref 2–8)
NEUTROPHILS NFR BLD AUTO: 49.8 % (ref 30–85)
NRBC BLD AUTO-RTO: 0 % (ref 0–0.01)
PLATELET # BLD AUTO: 72.5 10*3/UL (ref 130–400)
PMV BLD AUTO: 9.3 FL (ref 7.4–10.4)
RBC # BLD AUTO: 3.18 10*6/UL (ref 4.7–6.1)
VARIANT LYMPHS NFR BLD MANUAL: 32.1 % (ref 20–45)
WBC # BLD AUTO: 3.14 10*3/UL (ref 4.8–10.8)

## 2019-02-14 ENCOUNTER — OFFICE VISIT CONVERTED (OUTPATIENT)
Dept: FAMILY MEDICINE CLINIC | Facility: CLINIC | Age: 79
End: 2019-02-14
Attending: FAMILY MEDICINE

## 2021-05-15 VITALS
WEIGHT: 242.25 LBS | DIASTOLIC BLOOD PRESSURE: 58 MMHG | OXYGEN SATURATION: 95 % | HEIGHT: 72 IN | BODY MASS INDEX: 32.81 KG/M2 | HEART RATE: 81 BPM | SYSTOLIC BLOOD PRESSURE: 131 MMHG | TEMPERATURE: 97 F | RESPIRATION RATE: 20 BRPM

## 2021-05-15 VITALS — RESPIRATION RATE: 14 BRPM | BODY MASS INDEX: 30.61 KG/M2 | WEIGHT: 226 LBS | HEIGHT: 72 IN

## 2021-05-16 VITALS
HEIGHT: 72 IN | DIASTOLIC BLOOD PRESSURE: 50 MMHG | OXYGEN SATURATION: 98 % | SYSTOLIC BLOOD PRESSURE: 132 MMHG | HEART RATE: 62 BPM | TEMPERATURE: 97.3 F | WEIGHT: 229.19 LBS | BODY MASS INDEX: 31.04 KG/M2 | RESPIRATION RATE: 20 BRPM

## 2021-05-16 VITALS
BODY MASS INDEX: 30.67 KG/M2 | WEIGHT: 226.44 LBS | OXYGEN SATURATION: 97 % | TEMPERATURE: 96.7 F | HEART RATE: 77 BPM | SYSTOLIC BLOOD PRESSURE: 118 MMHG | RESPIRATION RATE: 20 BRPM | DIASTOLIC BLOOD PRESSURE: 60 MMHG | HEIGHT: 72 IN

## 2021-05-16 VITALS
HEIGHT: 72 IN | WEIGHT: 230 LBS | BODY MASS INDEX: 31.15 KG/M2 | SYSTOLIC BLOOD PRESSURE: 135 MMHG | HEART RATE: 63 BPM | DIASTOLIC BLOOD PRESSURE: 49 MMHG

## 2021-05-16 VITALS
SYSTOLIC BLOOD PRESSURE: 144 MMHG | TEMPERATURE: 97.1 F | DIASTOLIC BLOOD PRESSURE: 54 MMHG | BODY MASS INDEX: 30.49 KG/M2 | HEART RATE: 70 BPM | RESPIRATION RATE: 20 BRPM | HEIGHT: 72 IN | WEIGHT: 225.12 LBS | OXYGEN SATURATION: 98 %

## 2021-05-16 VITALS
HEART RATE: 72 BPM | DIASTOLIC BLOOD PRESSURE: 54 MMHG | HEIGHT: 72 IN | SYSTOLIC BLOOD PRESSURE: 136 MMHG | RESPIRATION RATE: 20 BRPM | BODY MASS INDEX: 30.08 KG/M2 | TEMPERATURE: 96.8 F | OXYGEN SATURATION: 99 % | WEIGHT: 222.12 LBS

## 2021-05-16 VITALS
RESPIRATION RATE: 20 BRPM | OXYGEN SATURATION: 97 % | WEIGHT: 230.37 LBS | HEART RATE: 69 BPM | HEIGHT: 72 IN | DIASTOLIC BLOOD PRESSURE: 67 MMHG | SYSTOLIC BLOOD PRESSURE: 149 MMHG | BODY MASS INDEX: 31.2 KG/M2 | TEMPERATURE: 97.8 F

## 2021-05-16 VITALS
SYSTOLIC BLOOD PRESSURE: 184 MMHG | HEIGHT: 72 IN | BODY MASS INDEX: 31.56 KG/M2 | HEART RATE: 60 BPM | DIASTOLIC BLOOD PRESSURE: 84 MMHG | WEIGHT: 233 LBS

## 2021-05-16 VITALS
OXYGEN SATURATION: 100 % | WEIGHT: 222.12 LBS | HEART RATE: 61 BPM | DIASTOLIC BLOOD PRESSURE: 63 MMHG | SYSTOLIC BLOOD PRESSURE: 157 MMHG | HEIGHT: 72 IN | RESPIRATION RATE: 20 BRPM | BODY MASS INDEX: 30.08 KG/M2 | TEMPERATURE: 97 F

## 2021-05-16 VITALS
RESPIRATION RATE: 24 BRPM | DIASTOLIC BLOOD PRESSURE: 53 MMHG | TEMPERATURE: 97.9 F | OXYGEN SATURATION: 90 % | HEART RATE: 65 BPM | SYSTOLIC BLOOD PRESSURE: 148 MMHG | BODY MASS INDEX: 30.27 KG/M2 | WEIGHT: 223.5 LBS | HEIGHT: 72 IN

## 2021-05-16 VITALS
OXYGEN SATURATION: 92 % | HEIGHT: 72 IN | HEART RATE: 65 BPM | WEIGHT: 224.06 LBS | DIASTOLIC BLOOD PRESSURE: 58 MMHG | BODY MASS INDEX: 30.35 KG/M2 | SYSTOLIC BLOOD PRESSURE: 175 MMHG | TEMPERATURE: 96.8 F | RESPIRATION RATE: 20 BRPM

## 2021-05-16 VITALS
BODY MASS INDEX: 30.88 KG/M2 | WEIGHT: 228 LBS | HEIGHT: 72 IN | DIASTOLIC BLOOD PRESSURE: 88 MMHG | HEART RATE: 66 BPM | SYSTOLIC BLOOD PRESSURE: 170 MMHG

## 2021-05-16 VITALS — OXYGEN SATURATION: 88 %

## 2021-05-28 VITALS
TEMPERATURE: 97.9 F | SYSTOLIC BLOOD PRESSURE: 141 MMHG | HEIGHT: 72 IN | HEIGHT: 72 IN | OXYGEN SATURATION: 97 % | OXYGEN SATURATION: 95 % | TEMPERATURE: 97.6 F | HEART RATE: 83 BPM | WEIGHT: 226.85 LBS | RESPIRATION RATE: 18 BRPM | HEART RATE: 70 BPM | TEMPERATURE: 97.8 F | DIASTOLIC BLOOD PRESSURE: 63 MMHG | OXYGEN SATURATION: 96 % | RESPIRATION RATE: 20 BRPM | HEIGHT: 72 IN | SYSTOLIC BLOOD PRESSURE: 146 MMHG | DIASTOLIC BLOOD PRESSURE: 53 MMHG | DIASTOLIC BLOOD PRESSURE: 59 MMHG | WEIGHT: 223.55 LBS | BODY MASS INDEX: 30.73 KG/M2 | SYSTOLIC BLOOD PRESSURE: 119 MMHG | BODY MASS INDEX: 30.64 KG/M2 | HEART RATE: 77 BPM | WEIGHT: 226.19 LBS | BODY MASS INDEX: 30.28 KG/M2 | RESPIRATION RATE: 18 BRPM

## 2021-05-28 VITALS
OXYGEN SATURATION: 95 % | OXYGEN SATURATION: 95 % | WEIGHT: 228.62 LBS | SYSTOLIC BLOOD PRESSURE: 138 MMHG | BODY MASS INDEX: 30.97 KG/M2 | HEART RATE: 76 BPM | BODY MASS INDEX: 31.29 KG/M2 | DIASTOLIC BLOOD PRESSURE: 58 MMHG | TEMPERATURE: 97.4 F | HEIGHT: 72 IN | DIASTOLIC BLOOD PRESSURE: 62 MMHG | WEIGHT: 231.04 LBS | HEART RATE: 74 BPM | HEIGHT: 72 IN | TEMPERATURE: 97.8 F | SYSTOLIC BLOOD PRESSURE: 152 MMHG

## 2021-05-28 NOTE — PROGRESS NOTES
Patient: MICHELLE CARRINGTON     Acct: [id number]     Report: #[id number]  UNIT #: [id number]     : 1940    Encounter Date:2018  PRIMARY CARE:   ***Signed***  --------------------------------------------------------------------------------------------------------------------  NURSE INTAKE      Visit Type      Established Patient Visit            Chief Complaint      METASTATIC PROSTATE CANCER            Referring Provider/Copies To      Referring Provider:  FREDDIE BENITES      Primary Care Provider:  MING DENNIS            History and Present Illness      Past Oncology Illness History      1) Prostate Cancer: Diagnosed 2015, [place] 4 + 5 + 9. PSA: 646; Stage IV     with widespread bone metastases and pelvic sidewall LAD at diagnosis            Treatment History:            1) s/p Trelstar with concurrent casodex for 1 week at start      2) s/p Docetaxel X 6 cycles completed 3/4/16      3) Ongoing Trelstar with Xgeva      4) Therapy missed secondary to ACS/MI (2018)      5) New L neck LAD; plan for restaging and resumption of Trelstar/Xgeva     (18)      6) Trelstar resumed 18; plan for L neck LN biopsy and potentially BMBX     (18)            HPI - Oncology Interim      Mr. Carrington comes in for f/u regarding several chronic issues:            1) Metastatic Prostate Cancer: Mr. Carrington comes in for follow-up.  He comes in to    review the results of his imaging studies and to resume Trelstar treatment.      Overall, he reports that he is stable, but does complain of persistent and     ongoing asthenia.  He describes it as a feeling of fatigue and tiredness.  It is    currently 3 out of 10 in severity.  No other modifying factors or associated     signs or symptoms.  He otherwise denies significant focal pain.            2) Anemia: Mr. Carrington denies melena or hematochezia.            3) Neck LAD: Mr. Carrington does complain of neuropathic symptoms.  He describes it     as numbness located  in the left lip.  It is intermittent and of mild severity.      No other modifying factors or associated signs or symptoms.            4) Bone Metastases: Mr. Milian denies significant focal bone aches or pains at     this time.            Metastatic Sites      Bone            Clinical Staging      High volume metastatic Jane 9 prostate cancer with widespread metastatic     osseous involvement            Clinical Trial Participant      No            ECOG Performance Status      0            Most Recent Lab Findings      Laboratory Tests      12/14/18 14:25            Laboratory Tests            Test       12/14/18      14:25             Ferritin       292 ng/mL      ()            PAST, FAMILY   Past Medical History      Past Medical History:  Arthritis, Diabetes Type 2, Heart Attack (2018),     Hypertension, Short of Air      Hematology/Oncology (M):  Prostate Cancer            Past Surgical History      Coronary Stent            Social History      Lives independently:  Yes      Occupation:  retired- drove truck            Tobacco Use      Tobacco status:  Former smoker      Smoking packs/day:  1      Smoking history:  10-25 pack years      Quit status:  Quit date established (1974)            Alcohol Use      Alcohol intake:  None            Substance Use      Substance use:  Denies use            REVIEW OF SYSTEMS      General:  Admits: Fatigue;          Denies: Appetite Change, Fever, Night Sweats, Weight Gain, Weight Loss      Eye:  Denies: Blurred Vision, Corrective Lenses, Diplopia, Eye Irritation, Eye     Pain, Eye Redness, Spots in Vision, Vision Loss      ENT:  Denies: Headache, Hearing Loss, Hoarseness, Seizures, Sinus Congestion,     Sore Throat      Other      numbness in bottom lip      Cardiovascular:  Denies: Chest Pain, Edema Ankles, Edema Legs, Irregular     Heartbeat, Palpitations      Respiratory:  Denies: Coughing Blood, Productive Cough, Shortness of Air,     Wheezing       Gastrointestinal:  Denies: Bloody Stools, Constipation, Diarrhea, Frequent     Heartburn, Nausea, Problem Swallowing, Tarry Stools, Unable to Control Bowels,     Vomiting      Genitourinary (male):  Denies: Blood in Urine, Decrease Urine Stream, Frequent     Urination, Incontinence, Painful Urination      Musculoskeletal:  Denies: Back Pain, Leg Cramps, Muscle Pain, Muscle Weakness,     Painful Joints, Swollen Joints      Integumentary:  Denies: Bleeds Easily, Bruises Easily, Hair Changes, Jaundice,     Lesions, Mole Changes, Nail Changes, Pigment Changes, Rash, Skin Discoloration      Neurologic:  Denies: Dizziness, Fainting, Numbness\Tingling, Paralysis, Seizures      Psychiatric:  Denies: Anxiety, Confused, Depression, Disoriented, Memory Loss      Endocrine:  Denies: Cold Intolerance, Diabetes, Excessive Sweating, Excessive     Thirst, Excessive Urination, Heat Intolerance, Flushing, Hyperthyroidism,     Hypothyroidism      Hematologic/Lymphatic:  Denies: Bruising, Bleeding, Enlarged Lymph Nodes,     Recurrent Infections, Transfusions            VITAL SIGNS AND SCORES      Vitals      Height 6 ft  / 182.88 cm      Weight 226 lbs 3.071 oz / 102.6 kg      BSA 2.24 m2      BMI 30.7 kg/m2      Temperature 97.6 F / 36.44 C - Temporal      Pulse 70      Respirations 18      Blood Pressure 146/63 Sitting, Right Arm      Pulse Oximetry 95%, rm air            Pain Score      Experiencing any pain?:  No      Pain Scale Used:  Numerical            Fatigue Score      Experiencing any fatigue?:  Yes      Fatigue (0-10 scale):  3            EXAM      General Appearance:  Positive for: Alert, Oriented x3      Eye:  Positive for: Anicteric Sclerae      Respiratory:  Positive for: CTAB      Abdomen/Gastro:  Positive for: Soft;          Negative for: Hepatosplenomegaly      Skin:  Negative for: Induration, Lesions      Psychiatric:  Positive for: AAO X 3, Appropriate Affect      Lower Extremities:  Negative for: Edema             PREVENTION      Hx Influenza Vaccination:  No (REFUSES)      Influenza Vaccine Declined:  Yes      2 or More Falls Past Year?:  No      Fall Past Year with Injury?:  No      Hx Pneumococcal Vaccination:  No (REFUSES)      Encouraged to follow-up with:  PCP regarding preventative exams.      Chart initiated by      LETHA ONEIL [facility]            ALLERGY/MEDS      Allergies      Coded Allergies:             NO KNOWN DRUG ALLERGIES (Verified  Allergy, Unknown, 7/2/18)            Medications      Last Reconciled on 6/4/18 15:00 by ANNIKA MAURO      Metoprolol Succ/HCTZ 100/25 Mg (Metoprolol Hctz 100/25 Mg) 1 Each Tablet      1 TAB PO BID, TAB         Reported         12/14/18       Isosorbide Mononitrate ER (Isosorbide Mononitrate ER) 30 Mg Tab.er.24h      30 MG PO QDAY, TAB.ER.24H         Reported         12/14/18       Lisinopril* (Lisinopril*) 40 Mg Tablet      40 MG PO QDAY, #30 TAB 0 Refills         Reported         12/14/18       Atorvastatin (Atorvastatin) 40 Mg Tablet      40 MG PO HS, #30 TAB 0 Refills         Reported         12/14/18       Citalopram HBr (Citalopram HBr) 40 Mg Tablet      40 MG PO QDAY, #30 TAB 0 Refills         Reported         12/14/18       diphenhydrAMINE HCl (Q-Dryl) 25 Mg Cap      25 MG PO Q6H PRN for ALLERGIES, CAP         Reported         12/14/18       Acetaminophen (Tylenol) 325 Mg Tablet      650 MG PO Q4H PRN for PAIN OR FEVER, #100 TAB 0 Refills         Reported         12/14/18       Metoprolol Succinate (Toprol XL*) 25 Mg Tab.er.24h      25 MG PO HS, #30 TAB 5 Refills         Prov: DELMISFREDDIE         8/8/18       glipiZIDE XL (glipiZIDE XL*) 10 Mg Tab.er.24      10 MG PO QDAY, #30 TAB.ER 0 Refills         Reported         8/4/18       Atorvastatin (Atorvastatin) 20 Mg Tablet      20 MG PO HS, TAB 0 Refills         Reported         12/7/16       Calcium Carb/Vit D3 (CALCIUM 600-VIT D3 400 TABLET) 1 Each Tablet      600 MG PO BID, TAB 0 Refills         Reported          12/7/16       Cholecalciferol (Vitamin D3*) 1,000 Units Capsule      1000 UNITS PO QDAY, CAP 0 Refills         Reported         12/7/16       Insulin Glargine (Lantus VIAL) 100 Units/Ml Vial      40 UNITS SUBQ HS, #1 VIAL 0 Refills         Reported         11/13/15       Amlodipine/Benazepril Hcl (Amlodipine/Benazepril 10/40 Mg) 1 Cap Cap      1 CAP PO QDAY         Reported         12/1/13       Aspirin EC (Aspirin EC) 81 Mg Tabec      81 MG PO QDAY         Reported         10/30/12      Medications Reviewed:  No Changes made to meds            IMPRESSION/PLAN      Impression      1) Metastatic Prostate Cancer: Mr. Milian is a 78-year-old gentleman with a     history of metastatic prostate cancer.  His PSA is increasing, and his restaging    CT scan shows evidence for bilateral adrenal metastases and left neck     lymphadenopathy of unclear etiology.  My recommendation would be to proceed with    a biopsy of the left neck lymph nodes to determine whether this represents     metastatic prostate cancer or potentially a hematologic malignancy.  Further     treatment decisions will be contingent upon those results.  Otherwise, we will     resume Trelstar at this time, in particular given that his testosterone level is    elevated.  He will likely require a bone scan or PET scan for further staging as    well.            2) Anemia: We will need to follow this closely.  He may require a bone marrow     biopsy for further evaluation.            3) Neck LAD: This is of an unclear etiology.  I am concerned for the possibility    of aggressive hematologic malignancy as an explanation.  We will proceed with a     biopsy at this time.  I did discuss the case directly with radiology.  It is     determined to be feasible to proceed with core biopsy to evaluate this.            4) Bone Metastases: We will follow this closely and initiate Xgeva when     feasible.            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 Please see [employer] for the authenticated document.

## 2021-05-28 NOTE — PROGRESS NOTES
Patient: MICHELLE CARRINGTON     Acct: [id number]     Report: #[id number]  UNIT #: [id number]     : 1940    Encounter Date:2019  PRIMARY CARE:   ***Signed***  --------------------------------------------------------------------------------------------------------------------  NURSE INTAKE      Visit Type      Established Patient Visit            Chief Complaint      METS PROSTATE CANCER            Referring Provider/Copies To      Referring Provider:  FREDDIE BENITES      Primary Care Provider:  MING DENNIS            History and Present Illness      Past Oncology Illness History      1) Prostate Cancer: Diagnosed 2015, [place] 4 + 5 + 9. PSA: 646; Stage IV     with widespread bone metastases and pelvic sidewall LAD at diagnosis            Treatment History:            1) s/p Trelstar with concurrent casodex for 1 week at start      2) s/p Docetaxel X 6 cycles completed 3/4/16      3) Ongoing Trelstar with Xgeva      4) Therapy missed secondary to ACS/MI (2018)      5) New L neck LAD; plan for restaging and resumption of Trelstar/Xgeva     (18)      6) Trelstar resumed 18; plan for L neck LN biopsy and potentially BMBX     (18)      7) Initial BX non-diagnostic but concerning for new diagnosis; plan for PET     scan/MRI head/BMBX and L neck LN dissection ASAP (19)            HPI - Oncology Interim      Mr. Carrington comes in for f/u regarding several chronic issues:            1) Anemia: Mr. Carrington comes in for follow-up.  In the interim, he reports that he    has been doing poorly.  He reports worsening fatigue and weakness.  He describes    this as a lack of energy and shortness of breath with exertion.  It improves     with rest.  It is of moderate severity and is worsened over the last 2 weeks.      No other modifying factors or associated signs or symptoms.  He denies melena or    hematochezia.            2) Carcinoma Unknown Primary: Mr. Carrington underwent a biopsy of the left  neck.  He    comes in to review the results.  He reports that the masses in the left neck     continue to increase in size.  He also reports pain located in the posterior     upper neck.  It is of moderate severity.  It is improved with Naprosyn     treatment.            3) Bone Metastases: Mr. Milian does complain of neck pain as previously     described.            Metastatic Sites      Bone            Clinical Staging      High volume metastatic Jane 9 prostate cancer with widespread metastatic     osseous involvement            Clinical Trial Participant      No            ECOG Performance Status      0            PAST, FAMILY   Past Medical History      Past Medical History:  Arthritis, Diabetes Type 2, Heart Attack (2018),     Hypertension, Short of Air      Hematology/Oncology (M):  Prostate Cancer            Past Surgical History      Coronary Stent            Social History      Lives independently:  Yes      Occupation:  retired- drove truck            Tobacco Use      Tobacco status:  Former smoker      Smoking packs/day:  1      Smoking history:  10-25 pack years      Quit status:  Quit date established (1974)            Alcohol Use      Alcohol intake:  None            Substance Use      Substance use:  Denies use            REVIEW OF SYSTEMS      General:  Admits: Fatigue;          Denies: Appetite Change, Fever, Night Sweats, Weight Gain, Weight Loss      Eye:  Denies: Blurred Vision, Corrective Lenses, Diplopia, Eye Irritation, Eye     Pain, Eye Redness, Spots in Vision, Vision Loss      ENT:  Denies: Headache, Hearing Loss, Hoarseness, Seizures, Sinus Congestion,     Sore Throat      Cardiovascular:  Denies: Chest Pain, Edema Ankles, Edema Legs, Irregular     Heartbeat, Palpitations      Respiratory:  Denies: Coughing Blood, Productive Cough, Shortness of Air,     Wheezing      Gastrointestinal:  Denies: Bloody Stools, Constipation, Diarrhea, Frequent     Heartburn, Nausea, Problem Swallowing,  Tarry Stools, Unable to Control Bowels,     Vomiting      Genitourinary (male):  Denies: Blood in Urine, Decrease Urine Stream, Frequent     Urination, Incontinence, Painful Urination      Musculoskeletal:  Denies: Back Pain, Leg Cramps, Muscle Pain, Muscle Weakness,     Painful Joints, Swollen Joints      Integumentary:  Denies: Bleeds Easily, Bruises Easily, Hair Changes, Jaundice,     Lesions, Mole Changes, Nail Changes, Pigment Changes, Rash, Skin Discoloration      Neurologic:  Denies: Dizziness, Fainting, Numbness\Tingling, Paralysis, Seizures      Psychiatric:  Denies: Anxiety, Confused, Depression, Disoriented, Memory Loss      Endocrine:  Denies: Cold Intolerance, Diabetes, Excessive Sweating, Excessive     Thirst, Excessive Urination, Heat Intolerance, Flushing, Hyperthyroidism,     Hypothyroidism      Hematologic/Lymphatic:  Denies: Bruising, Bleeding, Enlarged Lymph Nodes,     Recurrent Infections, Transfusions            VITAL SIGNS AND SCORES      Vitals      Height 6 ft  / 182.88 cm      Weight 223 lbs 8.743 oz / 101.4 kg      BSA 2.23 m2      BMI 30.3 kg/m2      Temperature 97.8 F / 36.56 C - Temporal      Pulse 77      Respirations 20      Blood Pressure 141/53 Sitting, Left Arm      Pulse Oximetry 97%, RM AIR            Pain Score      Experiencing any pain?:  No      Pain Scale Used:  Numerical      Pain Intensity:  0            Fatigue Score      Experiencing any fatigue?:  Yes      Fatigue (0-10 scale):  10            EXAM      General Appearance:  Positive for: Alert, Oriented x3      Eye:  Positive for: Anicteric Sclerae      HEENT:  Positive for: Oropharynx clear      Neck:  Positive for: Masses      Respiratory:  Positive for: CTAB      Abdomen/Gastro:  Positive for: Soft;          Negative for: Hepatosplenomegaly      Cardiovascular:  Positive for: RRR;          Negative for: Murmur      Skin:  Negative for: Induration, Lesions      Psychiatric:  Positive for: AAO X 3, Appropriate Affect       Lower Extremities:  Positive for: Edema;          Negative for: Clubbing, Deformities      Lymphatic:  Positive for: Cervical            PREVENTION      Hx Influenza Vaccination:  No (REFUSES)      Influenza Vaccine Declined:  Yes      2 or More Falls Past Year?:  No      Fall Past Year with Injury?:  No      Hx Pneumococcal Vaccination:  No (REFUSES)      Encouraged to follow-up with:  PCP regarding preventative exams.      Chart initiated by      LETHA ONEIL [facility]            ALLERGY/MEDS      Allergies      Coded Allergies:             NO KNOWN DRUG ALLERGIES (Verified  Allergy, Unknown, 7/2/18)            Medications      Last Reconciled on 6/4/18 15:00 by ANNIKA MAURO      Metoprolol Succ/HCTZ 100/25 Mg (Metoprolol Hctz 100/25 Mg) 1 Each Tablet      1 TAB PO BID, TAB         Reported         12/14/18       Isosorbide Mononitrate ER (Isosorbide Mononitrate ER) 30 Mg Tab.er.24h      30 MG PO QDAY, TAB.ER.24H         Reported         12/14/18       Lisinopril* (Lisinopril*) 40 Mg Tablet      40 MG PO QDAY, #30 TAB 0 Refills         Reported         12/14/18       Atorvastatin (Atorvastatin) 40 Mg Tablet      40 MG PO HS, #30 TAB 0 Refills         Reported         12/14/18       Citalopram HBr (Citalopram HBr) 40 Mg Tablet      40 MG PO QDAY, #30 TAB 0 Refills         Reported         12/14/18       diphenhydrAMINE HCl (Q-Dryl) 25 Mg Cap      25 MG PO Q6H PRN for ALLERGIES, CAP         Reported         12/14/18       Acetaminophen (Tylenol) 325 Mg Tablet      650 MG PO Q4H PRN for PAIN OR FEVER, #100 TAB 0 Refills         Reported         12/14/18       Metoprolol Succinate (Toprol XL*) 25 Mg Tab.er.24h      25 MG PO HS, #30 TAB 5 Refills         Prov: FELICIANO BENITESBOALANA         8/8/18       glipiZIDE XL (glipiZIDE XL*) 10 Mg Tab.er.24      10 MG PO QDAY, #30 TAB.ER 0 Refills         Reported         8/4/18       Atorvastatin (Atorvastatin) 20 Mg Tablet      20 MG PO HS, TAB 0 Refills         Reported          12/7/16       Calcium Carb/Vit D3 (CALCIUM 600-VIT D3 400 TABLET) 1 Each Tablet      600 MG PO BID, TAB 0 Refills         Reported         12/7/16       Cholecalciferol (Vitamin D3*) 1,000 Units Capsule      1000 UNITS PO QDAY, CAP 0 Refills         Reported         12/7/16       Insulin Glargine (Lantus VIAL) 100 Units/Ml Vial      40 UNITS SUBQ HS, #1 VIAL 0 Refills         Reported         11/13/15       Amlodipine/Benazepril Hcl (Amlodipine/Benazepril 10/40 Mg) 1 Cap Cap      1 CAP PO QDAY         Reported         12/1/13       Aspirin EC (Aspirin EC) 81 Mg Tabec      81 MG PO QDAY         Reported         10/30/12      Medications Reviewed:  No Changes made to meds            IMPRESSION/PLAN      Impression      1) Anemia: Mr. Milian is a 78-year-old gentleman with a finding of anemia.  This     has substantially worsened.  He will require transfusion to address this.  In     fact, he now has pancytopenia.  I would recommend a bone marrow biopsy at this     time to further evaluate for potential etiologies.  I will see him back to     review the results.            2) Carcinoma Unknown Primary: I had an extensive discussion today with him     regarding this.  I also discussed the case directly with pathology.  In     reviewing his previous prostate cancer biopsy, the malignancy seen on the left     neck biopsy does not appear to be the same malignancy.  However, there is not     enough tissue to further test for etiologies.  It is therefore recommended to     proceed with further biopsy for additional tissue to further characterize the     malignancy, and to potentially identify targetable mutations.            3) Bone Metastases: This is clinically stable.  We will follow this closely.            Diagnosis      Enlarged lymph node in neck - R59.0            Anemia         Anemia due to bone marrow failure, unspecified bone marrow failure type -        D61.9         Bone marrow failure anemia type: unspecified  bone marrow failure            Bilateral leg edema - R60.0            Leg pain         Pain of left lower extremity - M79.605         Laterality: left            Notes      New Diagnostics      * PET Skull Base Midthigh Init, Routine         Dx: Enlarged lymph node in neck - R59.0      * CBC, Routine         Dx: Enlarged lymph node in neck - R59.0      * Bone Marrow, Aspiration, BX CT, Routine         Dx: Anemia - D64.9      * Brain W/WO Cont MRI, As Soon As Possible         Dx: Anemia - D64.9      * CMP Comp Metabolic Panel, Routine         Dx: Anemia - D64.9      * LDH, Routine         Dx: Anemia - D64.9      * Venous Eval-Lower, SCHEDULED PROCEDURE         Dx: Anemia - D64.9      New Referrals      * Surgery, As Soon As Possible         Rom Mendoza         Reason for Referral: needs excisional biopsy left neck lymph node         Dx: Enlarged lymph node in neck - R59.0            Plan      1) PET Scan            2) Brain MRI             3) Bone Marrow Bx ordered.             4) Referral to [facility] for excisional biopsy of left neck lymph node.            5) US Bilateral LE             6) RTC 2-3 days after PET and MRI to review results            The total time of the visit was 45 minutes.  Over 50% of the time was spent in     face-to-face counseling regarding his various workup findings, differential     diagnosis, treatment options and coordination of care.            ADDENDUM: CBC with pancytopenia; plan for 2 unit PRBC transfusion and BMBX ASAP            Patient Education      Patient Education Provided:  Yes                 Disclaimer: Converted document may not contain table formatting or lab diagrams. Please see [employer] for the authenticated document.

## 2021-05-28 NOTE — H&P
Patient: MICHELLE CARRINGTON     Acct: [id number]     Report: #[id number]  UNIT #: [id number]     : 1940    Encounter Date:2018  PRIMARY CARE:   ***Signed***  --------------------------------------------------------------------------------------------------------------------  Chief Complaint      2018      Prostate Cancer            Vitals      Height 6 ft  / 182.88 cm      Weight 228 lbs 9.872 oz / 103.7 kg      BSA 2.32 m2      BMI 31.0 kg/m2      Temperature 97.4 F / 36.33 C - Temporal      Pulse 76      Blood Pressure 138/62 Sitting, Left Arm      Pulse Oximetry 95%, ROOM AIR            General Information      Referring Provider:  FREDDIE BENITES      Primary Provider:  Ricardo Lua      Provider Not Found in Lookup:  Noemi Giron            Allergies      Coded Allergies:             NO KNOWN DRUG ALLERGIES (Verified  Allergy, Unknown, 18)            Medications      Last Reconciled on 18 11:14 by PATRICK LICEA MD      Sotalol HCl (Sotalol HCl) 80 Mg Tablet      40 MG PO QDAY, #30 TAB         Prov: FREDDIE BENITES         16       glipiZIDE (glipiZIDE*) 10 Mg Tablet      10 MG PO BID         Reported         16       Atorvastatin (Atorvastatin) 20 Mg Tablet      20 MG PO HS, TAB 0 Refills         Reported         16       Calcium Carb/Vit D3 (Calcium Carb 600 + D) 1 Each Tablet      600 MG PO BID, TAB 0 Refills         Reported         16       Cholecalciferol (Vitamin D3*) 1,000 Units Capsule      1000 UNITS PO QDAY, CAP 0 Refills         Reported         16       Insulin Glargine (Lantus VIAL) 100 Units/Ml Vial      22 UNITS SUBQ HS, #1 VIAL 0 Refills         Reported         11/13/15       Amlodipine/Benazepril Hcl (Amlodipine/Benazepril 10/40 Mg) 1 Cap Cap      1 CAP PO QDAY         Reported         13       Aspirin EC (Aspirin EC*) 81 Mg Tabec      81 MG PO QDAY         Reported         10/30/12      Current Medications      Current Medications Reviewed  2/1/18            Pain and Fatigue Scales      Pain Assessment:           Experiencing any pain?:  No      Fatigue:           Experiencing any fatigue?:  Yes            Chemo Status      On Oral Chemotherapy?:  No            Other      Clinical Trial Participant?:  No            Past Medical History      Yes Diabetes Type 2, Yes COPD, Yes Arthritis      Hematology/oncology:  REPORTS HX OF: Prostate cancer      Previous Blood Transfusion:  Prev Blood Transfusion,how many? (1)            Past Surgical History      REPORTS HX OF: Appendectomy, VAD Placement, Biopsy (prostate)            Social History      Yes      retired- drove truck            Tobacco Use      Tobacco status:  Former smoker      Smoking packs/day:  1      Smoking history:  10-25 pack years      Quit status:  Quit date established (1974)            Alcohol Use      Alcohol intake:  None            Substance Use      Substance use:  Denies use            Past Oncology Illness History      Chief Complaint: High volume metastatic Jane 9 prostate cancer with     widespread metastatic osseous involvement            Mr. Ruben Milian is a 78-year-old  male recently diagnosed with     metastatic [place] 4+5=9 () prostate cancer.            He underwent prostate biopsy on 11/06/2015. This revealed multiple sections     involved with malignant adenocarcinoma of the prostate. The largest area was     involved with Jane 4+5=9 prostate cancer. Prior to this biopsy a PSA was     performed on 07/28/2015 which was markedly elevated at 321.70. The PSA was     repeated on 11/06/2015 and was again markedly elevated at 646.90. He underwent     a bone scan on 11/02/2015 which showed widespread osseous metastatic disease     involving the thoracic spine, lumbar spine, osseous pelvis, proximal femurs,     bilateral ribs, proximal humeri bilaterally, upper cervical spine, and at least     3 foci in the mid right femoral shaft. CT of the abdomen and  "pelvis performed 11 /02/2015 showed an enlarged prostate, abnormal enlarged pelvic sidewall lymph     nodes concerning for metastatic disease, and osteoblastic process in the pelvis     and spine consistent with metastatic disease.            He was then referred to my clinic and I saw him for the first time on 11/12/ 2015. At that time he reported a 30 pound weight loss and continued issues with     urinary retention. He states that he did receive one week of Casodex with a     subsequent androgen deprivation therapy utilizing Trelstar 22.5 mg every 24     week GnRH agonist. Importantly he denied any issues with pain or fracture. He     does state that the back pain he was having completely resolved after taking     Casodex.            I discussed with him the data from the CHAARTED trial and the NCCN     recommendations for patients with high volume disease who are androgen     deprivation na ve and today he presents after completing 6 of 6 treatments of     docetaxel chemotherapy (03/04/16). He has continued on ADT + Xgeva.            He again denies any musculoskeletal pain.He again denies any headache, nausea,     or vomiting. He does again report some very mild bilateral pedal edema. He     denies any fatigue.            He has had a greater than 10-fold reduction in PSA after 3 cycles. He reports     being diagnosed with bronchitis and is receiving Levaquin. He denies any fever.     ANC on 02/03/16 after cycle #4 was 720. He had some leg pain with cycle #5     Neulasta. His PSA normalized completely at his March 2016 follow-up.            He reported a \"knot\" on his posterior neck and this was imaged and was benign.     His receives ADT Trelstar currently.            Repeat imaging 09/30/16 showed an improved bone scan and CT scan without     evidence of progression.            PSA improved from 647 to 0.14. Further decrease to 0.08 after treatment. No     increase in pain, no fractures, no obstructive " "symptoms or hematuria. He     presents for additional follow-up and to review imaging. Imaging showed stable     disease with no evidence of progression.            Interim history: 17      PSA on 17 was 0.21 again slightly elevated from previous.  He denies pain.     Presents for scheduled follow-up and to review most recent imaging. No pain. No     hematuria. No fever.            Interim history:  17      Repeat imaging 17 with CT and bone scan shows stable disease no new     cancer identified. He presents to review the results of the imaging. He is in     good spirits, denies fever, weight loss or pain.            Interim history:  10/26/17      2 month follow up for labs including a PSA; last PSA 17 0.30. He again     denies pain, weight loss or fever.             Follow up on 1/3/18.  PSA on 17 1.89.  He denies new bone pain, weight     loss or fatigue. He is due for imaging. He denies any cough but does have some     fatigue and SOA.            Interim history:  18      Early follow up to review imaging. CT imaging from 2018 showed stable     osseous metastatic disease. Chronic emphysema. Nonspecified thickening of the     distal esophageal wall. Diffuse urinary bladder wall thickening with urinalysis     recommended. Most recent PSA 1.44 which has been declining.  He denies new pain    , weight loss or hot flashes. He does report that food occasionally gets \"stuck    \" when swallowing.            OTHER:      MOST RECENT IMAGING: CT C/A/P AND BONE SCAN ORDERED ON 1/3/18                     [facility]                PACS RADIOLOGY REPORT            Patient: MICHELLE CARRINGTON   Acct: #[id number]   Report: #[id number]            UNIT #: [id number]    DOS: 18 0944      INSURANCE:MEDICARE PART A   LOCATION:CT     : 1940            PROVIDERS      ADMITTING:     ATTENDING: Mitch Coleman      FAMILY:  MING DENNIS   " ORDERING:  Mitch Coleman         OTHER:    DICTATING:  Hardeep Gonzales MD            REQ #:[id number]   EXAM:CTACPWC - CT ABD CHEST PEL w CONTR      REASON FOR EXAM:  PROSTATE CA      REASON FOR VISIT:  PROSTATE CA            *******Signed******         PROCEDURE:   CT ABDOMEN; CT CHEST; CT PELVIS WITH CONTRAST             COMPARISON:   [facility], [place], BONE SCAN, 5/11/2017, 11:58.      [facility],       [place], BONE SCAN, 8/24/2017, 12:44.  [facility], CT, CT CHEST ABD     PEL W CONTRAST,       8/24/2017, 8:20.             INDICATIONS:   PROSTATE CA, restaging             TECHNIQUE:   After obtaining the patient's consent, CT images were obtained     with intravenous contrast       material.      PROTOCOL:     Standard imaging protocol performed                RADIATION:     DLP: 2186mGy*cm          Automated exposure control was utilized to minimize radiation dose.       CONTRAST:   100cc Isovue 370 I.V.             FINDINGS:         Chest: Similar-appearing large advanced apical predominant emphysematous     changes in both lungs.       Mild linear scarring or atelectasis in the base of the right middle lobe and     lingula. The lungs are       otherwise clear. No concerning lung nodule. Airways are widely patent. No     pneumothorax or pleural       effusion. The heart and great vessels of the chest are stable in size.     Calcified coronary artery       disease. No pericardial effusion. Calcified remnants of prior granulomatous     disease in the       mediastinum and right hilum. No pathologically enlarged intrathoracic lymph     nodes are identified.       Mild nonspecific thickening of the distal esophageal wall, recommend correlate     for GERD.       Similar-appearing extensive diffuse sclerotic changes of the bones, consistent     with sequela of       osseous metastatic disease, likely treated.             Abdomen and pelvis: Small stones are seen in the dependent portion of  the     otherwise unremarkable.       Gallbladder. Calcified remnants of prior grams disease in the spleen. The liver    , pancreas, adrenal       glands, and right kidney are unremarkable. Nausea a stone in the upper pole of     the left kidney. The       left kidney is otherwise unremarkable. Urinary bladder is nondistended. Mild     diffuse urinary       bladder wall thickening could be related to underdistention. No bowel     obstruction or significant       bowel wall thickening. Mild colonic stool burden. The appendix is not     definitively identified       however no pericecal inflammatory changes are seen. No free fluid in the     abdomen or pelvis. No free       intraperitoneal air. No abdominal or pelvic lymphadenopathy is identified.     Calcified       atherosclerotic disease in the abdominal aorta and its distal branches without     aneurysm.       Similar-appearing extensive diffuse sclerotic changes of the bones, consistent     with sequela of       osseous metastatic disease, likely treated.             CONTINUED ON NEXT PAGE...             CONCLUSION:         1. Similar-appearing diffuse sclerotic changes of the bones, consistent with     sequela of osseous       metastatic disease, likely treated. Nuclear medicine bone scan to follow.      2. Chronic emphysematous changes with no active pulmonary disease.      3. Calcified coronary artery disease.      4. Nonspecific thickening of the distal esophageal wall, recommend correlating     for GERD.      5. Cholelithiasis.      6. Left nephrolithiasis.      7. Diffuse urinary bladder wall thickening could be related to underdistention,     recommend       correlating with urinalysis.              GRACE CAMPBELL MD             Electronically Signed and Approved By: GRACE CAMPBELL MD on 1/17/2018 at 11:13                        Until signed, this is an unconfirmed preliminary report that may contain      errors and is subject to change.                                               OT:      D:01/17/18 1113            ROS      General:  Complains of: Fatigue      Eyes:  Complains of: Corrective lenses      Ears, nose, mouth, throat:  Denies: Headache      Cardiovascular:  Denies: Chest pain      Respiratory:  Denies: Shortness of Air      Gastrointestinal:  Denies: Nausea      Kidney/Bladder:  Denies: Painful Urination      Musculoskeletal:  Denies: New Back pain      Skin:  DENIES: Bruises Easily      Neurological:  Denies: Dizziness      Psychiatric:  Complains of: AAO X 3      Endocrine:  Diabetes      Hematologic/lymphatic:  Denies: Bruising            Vitals            Vital Signs              Date Time  Temp Pulse Resp B/P (MAP) Pulse Ox O2 Delivery O2 Flow Rate FiO2             1/3/18 11:20 97.8 74  152/58 95 rm air              General Appearance:  Alert, Oriented X3, No acute distress      Eyes:  Anicteric Sclerae, Moist Conjunctiva      HEENT:  Orophraynx clear, No Erythema      Respiratory:  CTAB, No Diminished Breath      Abdomen\Gastro:  Soft, No Masses      Cardio:  RRR, No Murmur, No, Peripheral Edema      Skin:  Normal Temperature, Normal Tone, No Rash, lesions, ulcers      Psychiatric:  Appropriate Affect, AAO x3      Neuro:  Cranial Nerve II-XII Inta, No Focal Sensory Deficit      Muscularskeletal:  Normal Gait and Station, Full ROM of extremeties      Extremities:  No Digital Cyanosis, No Digital Ischemia      Lymphatic:  No Cervical, No Supraclavicular, No Axillary            Lab Results      Laboratory Tests      1/3/18 12:19            Radiology Results      4/24/17 CT, Bone scan ordered            Current Plan      I reviewed the results of his recent surgical pathology which reveals an     aggressive [place] 9 adenocarcinoma of the prostate. I note his are clearly     elevated PSA at 647 on 11/06/2015. I also note CT imaging and bone scan     consistent with widely metastatic osseous involvement with no visceral     involvement.            I  reviewed with him the NCCN guidelines for treatment of metastatic prostate     cancer. I reviewed with him that for men with high volume ADT na ve metastatic     disease he would be a candidate for ADT plus docetaxel based on the results of     the ECOG 3805 (CHAARTED) trial. In this study patients were defined as having     high volume disease if they have 4 or more bone metastases including one extra-    axial bone lesion or visceral metastases. These patient's received 6 cycles of     docetaxel at 75 mg per meter squared every 3 weeks with prednisone plus ADT.     The result was an impressive 17 month improvement in overall survival with a     median overall survival of 57.6 months. He voiced understanding of this data     and was in agreement to the utility of adding docetaxel to ADT.            I reviewed with them the risks and potential side effects of docetaxel and he     was in agreement.            He presents after completing 6 of 6 doses of docetaxel on 03/04/16. Toxicity     monitoring. Labs and chemotherapy reviewed. I note that he has received a six-    month depot of Trelstar and this will be due again in roughly May 2016. I note     that his PSA has already improved considerably and has decreased over 10-fold     after cycle #3. Decreased to 11 prior to dose #6. Normalized on March 2016     recheck. Last PSA 0.08 and still declining.            PSA to be checked every 2-3 months. Imaging 05/2017 showed improved osseous     disease and no evidence of progression. Reviewed with patient at bedside. He     will continue ADT treatment through our service with Trelstar every 3 month     depot.            As noted his ANC was 720 after cycle #4. Will add Neulasta to cycle #5 and #6     moving forward. Agree with Levaquin for bronchitis. CT noted. Afebrile. Leg     pain and weakness with Neulasta cycle #5. ANC adequate after cycle #5.            He was noted to be anemic after cycle #5 with Hgb 7.96. 2  units PRBCs ordered.            I did review with him that he will likely require lifelong treatment (ADT) as     this is a stage IV prostate cancer. Due to this issue I recommended a Port-A-    Cath to be placed and he was in agreement.            I also reviewed the NCCN guideline recommendations for bone antiresorptive     therapy which is a category 1 recommendation in this setting for reduction of     skeletal related events. I favor the use of Xgeva and have asked him to start     taking a daily Vit D and Calcium supplement. I again note his widespread     osseous involvement.            I have asked him to elevate his heels above his heart for the swelling. Lower     extremity swelling is common with docetaxel and should improve. Improved.            PSA stable at 0.14, no pain.  Doing well. Xgeva today. Labs and imaging     ordered. Imaging will be performed every 3-6 months moving forward.             Plan:      4/11/17 PSA 0.08 . He is doing well other than complaints of hot flashes.      However, he does not want medication to help at this time.  Stable PSA , I have     ordered CT C/A/P and a bone scan.  I would like to see him back in a month to     review the results . We reviewed his last PSA and recent imaging.            PSA on 7/12/17 was 0.21 I discussed this with him at his scheduled follow up on     7/24/17.  I would like to repeat imaging to see if there is evidence of     progression and monitor his PSA and PSA velocity at this time. Imaging studies     ordered. He was agreeable. I briefly discussed Xofigo treatment with him if     this is indicated at the time of progression. Labs pending or reviewed.            He is here on 8/31/17 to review restaging CT and bone scan from 08/24/17 which     showed stable disease with no visceral involvement or visceral crisis. I note     his PSA slowly climbing but given his stable scans I feel we just watch this     closely. Lab results pending. Will see  him back every 2 months with labs and     PSA and repeat imaging in 4 months.  He was agreeable and felt this was a good     plan.             2 month follow up on 10/26/17 for prostate cancer.  He has minimal complaints     other than legs feeling tired. His last psa on 8/31/17 was 0.30. I counseled     him and educated him that I will be monitoring not only the PSA number but also     the PSA velocity.  I will see him back in 2 months. He is due for imaging in     the near future.            He is here for follow up and labs on 1/3/18. I reviewed his most recent PSA and     PSA trend at bedside as it has increased slightly and this will need to be     monitored for the trend and velocity. He is due for imaging and this has been     ordered. I will see him back in a month to review results.             He has a skin lesion on his right temple that looks suspicious I recommended     and referred him to dermatology to rule out melanoma vs other skin cancer.              Current plan      He is here on 2/1/18 for follow up to review imaging while on ADT injections.     As noted CT imaging from 01/17/2018 showed stable osseous metastatic disease.     Chronic emphysema. Nonspecified thickening of the distal esophageal wall.     Diffuse urinary bladder wall thickening with urinalysis recommended. Bone scan     was stable. I reviewed these studies and discussed with him at bedside. PSA     continues to decline. I counseled him I did not think CT findings were related     to his prostate cancer. I will perform U/A and have referred him for EGD as he     does report intermittent dysphagia. He was agreeable.  I will see him back in 3     months for followup and repeat PSA.              He was again given time to ask questions and these were answered in turn. At     the conclusion of the appointment he had no additional questions or concerns     and all questions were answered to his satisfaction.            TREATMENT HISTORY       Chemotherapy      1. Docetaxel and prednisone + ADT; and Xgeva; s/p dose #6 of 6 (03/04/16)      2. Continuation ADT + Xgeva      Prostate cancer metastatic to bone - C61, C79.51            Cancer of prostate w/high recur risk (T3a or Jane 8-10 or PSA>20) - C61            Dysphagia - R13.10            Abnormal CT of the abdomen - R93.5            Bladder wall thickening - N32.89            Esophageal thickening - K22.8            Notes      Discontinued Medications      * Hydrochlorothiazide (Hydrochlorothiazide*) 12.5 MG CAPSULE: 12.5 MG PO Q2D #30       Instructions: 1 CAP      New Diagnostics      * Urinalyis W/Micro, Stat       Dx: Prostate cancer metastatic to bone - C61, C79.51      New Office Procedures      * VAD Maintenance, STANDING ORDER       Dx: Prostate cancer metastatic to bone - C61, C79.51      New Referrals      * Gastroenterology, As Soon As Possible       Shan Craft       Dx: Prostate cancer metastatic to bone - C61, C79.51      Labs Reviewed During Visit?:  Yes      Review/Other:  Received Lab Test, Ordered Lab Test, Reviewed Radiology Test,     Reviewed Medicine Test, Discussed Care with other Physician (referral),     Reviewed Image Tracing or Specimen (path reviewed)      Patient Education Provided:  Yes      ECOG Score:  0      Clinical Staging      High volume metastatic Jane 9 prostate cancer with widespread metastatic     osseous involvement            Hx Influenza Vaccination:  No      Influenza Vaccine Declined:  Yes      2 or More Falls Past Year?:  No      Fall Past Year with Injury?:  No      Hx Pneumococcal Vaccination:  No      Encouraged to follow-up with:  PCP regarding preventative exams.      Chart initiated by      KATIE HAYNES                 Disclaimer: Converted document may not contain table formatting or lab diagrams. Please see [employer] [facility] for the authenticated document.

## 2021-05-28 NOTE — H&P
Patient: MICHELLE CARRINGTON     Acct: [id number]     Report: #[id number]  UNIT #: [id number]     : 1940    Encounter Date:2018  PRIMARY CARE:   ***Signed***  --------------------------------------------------------------------------------------------------------------------  Chief Complaint      Karl 3, 2018      prostate ca      Prostate Cancer            Vitals      Height 6 ft  / 182.88 cm      Weight 231 lbs 0.673 oz / 104.8 kg      BSA 2.33 m2      BMI 31.3 kg/m2      Temperature 97.8 F / 36.56 C - Temporal      Pulse 74      Blood Pressure 152/58 Sitting, Right Arm      Pulse Oximetry 95%, rm air            General Information      Referring Provider:  FREDDIE BENITES      Primary Provider:  Ricardo Lua      Provider Not Found in Lookup:  Noemi Giron            Allergies      Coded Allergies:             NO KNOWN DRUG ALLERGIES (Verified  Allergy, Unknown, 1/3/18)            Medications      Last Reconciled on 1/3/18 13:49 by PATRICK LICEA MD      Hydrochlorothiazide (Hydrochlorothiazide*) 12.5 Mg Capsule      12.5 MG PO Q2D, #30 TAB         Prov: FREDDIE BENITES         16       Sotalol HCl (Sotalol HCl) 80 Mg Tablet      40 MG PO QDAY, #30 TAB         Prov: FREDDIE BENITES         16       glipiZIDE (glipiZIDE*) 10 Mg Tablet      10 MG PO BID         Reported         16       Atorvastatin (Atorvastatin) 20 Mg Tablet      20 MG PO HS, TAB 0 Refills         Reported         16       Calcium Carb/Vit D3 (Calcium Carb 600 + D) 1 Each Tablet      600 MG PO BID, TAB 0 Refills         Reported         16       Cholecalciferol (Vitamin D3*) 1,000 Units Capsule      1000 UNITS PO QDAY, CAP 0 Refills         Reported         16       Tamsulosin HCL (Flomax*) 0.4 Mg Cap.sr.24h      0.4 MG PO QDAY, CAP 0 Refills         Reported         12/11/15       Insulin Glargine (Lantus VIAL) 100 Units/Ml Vial      22 UNITS SUBQ HS, #1 VIAL 0 Refills         Reported         11/13/15        Amlodipine/Benazepril Hcl (Amlodipine/Benazepril 10/40 Mg) 1 Cap Cap      1 CAP PO QDAY         Reported         12/1/13       Aspirin EC (Aspirin EC*) 81 Mg Tabec      81 MG PO QDAY         Reported         10/30/12      Current Medications      Current Medications Reviewed 1/3/18            Pain and Fatigue Scales      Pain Assessment:           Experiencing any pain?:  No      Fatigue:           Experiencing any fatigue?:  No            Chemo Status      On Oral Chemotherapy?:  No            Other      Clinical Trial Participant?:  No            Past Medical History      Yes Diabetes Type 2, Yes COPD, Yes Arthritis      Hematology/oncology:  REPORTS HX OF: Prostate cancer      Previous Blood Transfusion:  Prev Blood Transfusion,how many? (1)            Past Surgical History      REPORTS HX OF: Appendectomy, VAD Placement, Biopsy (prostate)            Social History      Yes      retired- drove truck            Tobacco Use      Tobacco status:  Former smoker      Smoking packs/day:  1      Smoking history:  10-25 pack years      Quit status:  Quit date established (1974)            Alcohol Use      Alcohol intake:  None            Substance Use      Substance use:  Denies use            Past Oncology Illness History      Chief Complaint: High volume metastatic Bard 9 prostate cancer with     widespread metastatic osseous involvement            Mr. Ruben Milian is a 77-year-old  male recently diagnosed with     metastatic Jane 4+5=9 () prostate cancer.            He underwent prostate biopsy on 11/06/2015. This revealed multiple sections     involved with malignant adenocarcinoma of the prostate. The largest area was     involved with Bard 4+5=9 prostate cancer. Prior to this biopsy a PSA was     performed on 07/28/2015 which was markedly elevated at 321.70. The PSA was     repeated on 11/06/2015 and was again markedly elevated at 646.90. He underwent     a bone scan on 11/02/2015 which  "showed widespread osseous metastatic disease     involving the thoracic spine, lumbar spine, osseous pelvis, proximal femurs,     bilateral ribs, proximal humeri bilaterally, upper cervical spine, and at least     3 foci in the mid right femoral shaft. CT of the abdomen and pelvis performed 11 /02/2015 showed an enlarged prostate, abnormal enlarged pelvic sidewall lymph     nodes concerning for metastatic disease, and osteoblastic process in the pelvis     and spine consistent with metastatic disease.            He was then referred to my clinic and I saw him for the first time on 11/12/ 2015. At that time he reported a 30 pound weight loss and continued issues with     urinary retention. He states that he did receive one week of Casodex with a     subsequent androgen deprivation therapy utilizing Trelstar 22.5 mg every 24     week GnRH agonist. Importantly he denied any issues with pain or fracture. He     does state that the back pain he was having completely resolved after taking     Casodex.            I discussed with him the data from the CHAARTED trial and the NCCN     recommendations for patients with high volume disease who are androgen     deprivation na ve and today he presents after completing 6 of 6 treatments of     docetaxel chemotherapy (03/04/16). He has continued on ADT + Xgeva.            He again denies any musculoskeletal pain.He again denies any headache, nausea,     or vomiting. He does again report some very mild bilateral pedal edema. He     denies any fatigue.            He has had a greater than 10-fold reduction in PSA after 3 cycles. He reports     being diagnosed with bronchitis and is receiving Levaquin. He denies any fever.     ANC on 02/03/16 after cycle #4 was 720. He had some leg pain with cycle #5     Neulasta. His PSA normalized completely at his March 2016 follow-up.            He reported a \"knot\" on his posterior neck and this was imaged and was benign.     His receives " ADT Trelstar currently.            Repeat imaging 09/30/16 showed an improved bone scan and CT scan without     evidence of progression.            PSA improved from 647 to 0.14. Further decrease to 0.08 after treatment. No     increase in pain, no fractures, no obstructive symptoms or hematuria. He     presents for additional follow-up and to review imaging. Imaging showed stable     disease with no evidence of progression.            Interim history: 7/24/17      PSA on 7/12/17 was 0.21 again slightly elevated from previous.  He denies pain.     Presents for scheduled follow-up and to review most recent imaging. No pain. No     hematuria. No fever.            Interim history:  8/31/17      Repeat imaging 08/24/17 with CT and bone scan shows stable disease no new     cancer identified. He presents to review the results of the imaging. He is in     good spirits, denies fever, weight loss or pain.            Interim history:  10/26/17      2 month follow up for labs including a PSA; last PSA 08/31/17 0.30. He again     denies pain, weight loss or fever.             Follow up on 1/3/18.  PSA on 11/2/17 1.89.  He denies new bone pain, weight     loss or fatigue. He is due for imaging. He denies any cough but does have some     fatigue and SOA.            OTHER:      MOST RECENT IMAGING: CT C/A/P AND BONE SCAN ORDERED ON 1/3/18            ROS      General:  Denies: Appetite change      Eyes:  Denies: Blurred vision      Ears, nose, mouth, throat:  Denies: Headache      Cardiovascular:  Denies: Chest pain      Respiratory:  Denies: Chest pain      Gastrointestinal:  Denies: Nausea      Kidney/Bladder:  Denies: Painful Urination      Musculoskeletal:  Denies: New Back pain      Skin:  DENIES: Bruises Easily      Neurological:  Denies: Dizziness      Psychiatric:  Complains of: AAO X 3            General Appearance:  Alert, Oriented X3, No acute distress      Eyes:  Anicteric Sclerae, Moist Conjunctiva      HEENT:   Orophraynx clear, No Erythema      Respiratory:  CTAB, No Diminished Breath      Abdomen\Gastro:  Soft, No Masses      Cardio:  RRR, No Murmur, No, Peripheral Edema      Skin:  Normal Temperature, Normal Tone, No Rash, lesions, ulcers      Psychiatric:  Appropriate Affect, AAO x3      Neuro:  Cranial Nerve II-XII Inta, No Focal Sensory Deficit      Muscularskeletal:  Normal Gait and Station, Full ROM of extremeties      Extremities:  No Digital Cyanosis, No Digital Ischemia      Lymphatic:  No Cervical, No Supraclavicular, No Axillary            Lab Results      Laboratory Tests      12/6/17 10:09            Radiology Results      4/24/17 CT, Bone scan ordered            Current Plan      I reviewed the results of his recent surgical pathology which reveals an     aggressive Jane 9 adenocarcinoma of the prostate. I note his are clearly     elevated PSA at 647 on 11/06/2015. I also note CT imaging and bone scan     consistent with widely metastatic osseous involvement with no visceral     involvement.            I reviewed with him the NCCN guidelines for treatment of metastatic prostate     cancer. I reviewed with him that for men with high volume ADT na ve metastatic     disease he would be a candidate for ADT plus docetaxel based on the results of     the ECOG 3805 (CHAARTED) trial. In this study patients were defined as having     high volume disease if they have 4 or more bone metastases including one extra-    axial bone lesion or visceral metastases. These patient's received 6 cycles of     docetaxel at 75 mg per meter squared every 3 weeks with prednisone plus ADT.     The result was an impressive 17 month improvement in overall survival with a     median overall survival of 57.6 months. He voiced understanding of this data     and was in agreement to the utility of adding docetaxel to ADT.            I reviewed with them the risks and potential side effects of docetaxel and he     was in agreement.             He presents after completing 6 of 6 doses of docetaxel on 03/04/16. Toxicity     monitoring. Labs and chemotherapy reviewed. I note that he has received a six-    month depot of Trelstar and this will be due again in roughly May 2016. I note     that his PSA has already improved considerably and has decreased over 10-fold     after cycle #3. Decreased to 11 prior to dose #6. Normalized on March 2016     recheck. Last PSA 0.08 and still declining.            PSA to be checked every 2-3 months. Imaging 05/2017 showed improved osseous     disease and no evidence of progression. Reviewed with patient at bedside. He     will continue ADT treatment through our service with Trelstar every 3 month     depot.            As noted his ANC was 720 after cycle #4. Will add Neulasta to cycle #5 and #6     moving forward. Agree with Levaquin for bronchitis. CT noted. Afebrile. Leg     pain and weakness with Neulasta cycle #5. ANC adequate after cycle #5.            He was noted to be anemic after cycle #5 with Hgb 7.96. 2 units PRBCs ordered.            I did review with him that he will likely require lifelong treatment (ADT) as     this is a stage IV prostate cancer. Due to this issue I recommended a Port-A-    Cath to be placed and he was in agreement.            I also reviewed the NCCN guideline recommendations for bone antiresorptive     therapy which is a category 1 recommendation in this setting for reduction of     skeletal related events. I favor the use of Xgeva and have asked him to start     taking a daily Vit D and Calcium supplement. I again note his widespread     osseous involvement.            I have asked him to elevate his heels above his heart for the swelling. Lower     extremity swelling is common with docetaxel and should improve. Improved.            PSA stable at 0.14, no pain.  Doing well. Xgeva today. Labs and imaging     ordered. Imaging will be performed every 3-6 months moving forward.              Most recent plan:      4/11/17 PSA 0.08 . He is doing well other than complaints of hot flashes.      However, he does not want medication to help at this time.  Stable PSA , I have     ordered CT C/A/P and a bone scan.  I would like to see him back in a month to     review the results . We reviewed his last PSA and recent imaging.            PSA on 7/12/17 was 0.21 I discussed this with him at his scheduled follow up on     7/24/17.  I would like to repeat imaging to see if there is evidence of     progression and monitor his PSA and PSA velocity at this time. Imaging studies     ordered. He was agreeable. I briefly discussed Xofigo treatment with him if     this is indicated at the time of progression. Labs pending or reviewed.            He is here on 8/31/17 to review restaging CT and bone scan from 08/24/17 which     showed stable disease with no visceral involvement or visceral crisis. I note     his PSA slowly climbing but given his stable scans I feel we just watch this     closely. Lab results pending. Will see him back every 2 months with labs and     PSA and repeat imaging in 4 months.  He was agreeable and felt this was a good     plan.             2 month follow up on 10/26/17 for prostate cancer.  He has minimal complaints     other than legs feeling tired. His last psa on 8/31/17 was 0.30. I counseled     him and educated him that I will be monitoring not only the PSA number but also     the PSA velocity.  I will see him back in 2 months. He is due for imaging in     the near future.            He is here for follow up and labs on 1/3/18. I reviewed his most recent PSA and     PSA trend at bedside as it has increased slightly and this will need to be     monitored for the trend and velocity. He is due for imaging and this has been     ordered. I will see him back in a month to review results.             He has a skin lesion on his right temple that looks suspicious I recommended     and referred  him to dermatology to rule out melanoma vs other skin cancer.              He was again given time to ask questions and these were answered in turn. At     the conclusion of the appointment he had no additional questions or concerns     and all questions were answered to his satisfaction.            TREATMENT HISTORY      Chemotherapy      1. Docetaxel and prednisone + ADT; and Xgeva; s/p dose #6 of 6 (03/04/16)      2. Continuation ADT + Xgeva      Cancer of prostate w/high recur risk (T3a or Jane 8-10 or PSA>20) - C61            PSA elevation - R97.2            Prostate cancer metastatic to bone - C61, C79.51            SOB (shortness of breath) - R06.02            Skin lesion of face - L98.9            Notes      New Diagnostics      * CBC, As Soon As Possible       Dx: Cancer of prostate w/high recur risk (T3a or Jane 8-10 or PSA>20) - C61      * PSA Diagnostic, As Soon As Possible       Dx: Cancer of prostate w/high recur risk (T3a or Mineral 8-10 or PSA>20) - C61      * CMP Comp Metabolic Panel, Stat       Dx: Cancer of prostate w/high recur risk (T3a or Mineral 8-10 or PSA>20) - C61      * Abd/Pel W/ Cont CT, SCHEDULED PROCEDURE       Dx: Cancer of prostate w/high recur risk (T3a or Mineral 8-10 or PSA>20) - C61      * Chest W/ Cont CT, SCHEDULED PROCEDURE       Dx: Cancer of prostate w/high recur risk (T3a or Jane 8-10 or PSA>20) - C61      * Bone Scan Limited NM, As Soon As Possible       Dx: Cancer of prostate w/high recur risk (T3a or Mineral 8-10 or PSA>20) - C61      New Referrals      * Dermatology, As Soon As Possible       Sinan Ashley       Dx: Cancer of prostate w/high recur risk (T3a or Jane 8-10 or PSA>20) - C61      Labs Reviewed During Visit?:  Yes      Review/Other:  Received Lab Test, Ordered Lab Test, Reviewed Radiology Test,     Reviewed Medicine Test, Discussed Care with other Physician (referral),     Reviewed Image Tracing or Specimen      ECOG Score:  0      Clinical Staging       High volume metastatic Jane 9 prostate cancer with widespread metastatic     osseous involvement            Hx Influenza Vaccination:  No      Influenza Vaccine Declined:  Yes      2 or More Falls Past Year?:  No      Fall Past Year with Injury?:  No      Hx Pneumococcal Vaccination:  No      Encouraged to follow-up with:  PCP regarding preventative exams.      Chart initiated by      zoë saini ma                 Disclaimer: Converted document may not contain table formatting or lab diagrams. Please see [facility] for the authenticated document.

## 2021-05-28 NOTE — PROGRESS NOTES
"Patient: MICHELLE CARRINGTON     Acct: [id number]     Report: #[id number]  UNIT #: [id number]     : 1940    Encounter Date:2019  PRIMARY CARE:   ***Signed***  --------------------------------------------------------------------------------------------------------------------  NURSE INTAKE      Visit Type      Established Patient Visit            Chief Complaint      mets prostate cancer            Referring Provider/Copies To      Referring Provider:  FREDDIE BENITES      Primary Care Provider:  IMNG DENNIS      Copies To:   MING DENNIS; FREDDIE BENITES ;            History and Present Illness      Past Oncology Illness History      1) Prostate Cancer: Diagnosed 2015, Jane 4 + 5 + 9. PSA: 646; Stage IV     with widespread bone metastases and pelvic sidewall LAD at diagnosis      2) New poorly differentiated \"malignancy\"; workup for etiology ongoing but stage    IV            Treatment History:            1) s/p Trelstar with concurrent casodex for 1 week at start      2) s/p Docetaxel X 6 cycles completed 3/4/16      3) Ongoing Trelstar with Xgeva      4) Therapy missed secondary to ACS/MI (2018)      5) New L neck LAD; plan for restaging and resumption of Trelstar/Xgeva     (18)      6) Trelstar resumed 18; plan for L neck LN biopsy and potentially BMBX     (18)      7) Initial BX non-diagnostic but concerning for new diagnosis; plan for PET     scan/MRI head/BMBX and L neck LN dissection ASAP (19)      8) BMBX non-diagnostic; L neck LN: \"poorly differentiated malignancy\"; workup     ongoing but sent for CARIS testing (19)            HPI - Oncology Interim      Mr. Carrington comes in for f/u regarding several chronic issues:            1) Metastatic Malignancy: Mr. Carrington comes in for follow-up.  In the interim, he     underwent a left neck lymph node dissection which reportedly shows a poorly     differentiated malignancy.  He comes in to review these results.  Overall, he   "   continues to decline to do poorly.  He reports that he is now using a     wheelchair.  He reports profound asthenia.  He describes it is a feeling of     fatigue and tiredness.  It is of significant severity.  No other modifying     factors or associated signs or symptoms.  He also reports increasing size of the    mass in his left neck.  He also complains of continuing neuropathic symptoms.      This is described as numbness located in the face and left ear.  It is of     moderate severity and constant.  No other modifying factors or associated signs     or symptoms.            2) Pancytopenia: Mr. Milian does report significant asthenia. He underwent a bone    marrow biopsy.  He comes in to review the results.            3) Bone Metastases: Mr. Milian denies significant focal bone aches or pains.            Metastatic Sites      Bone            Clinical Staging      High volume metastatic Jane 9 prostate cancer with widespread metastatic     osseous involvement            Clinical Trial Participant      No            ECOG Performance Status      0            Most Recent Lab Findings      Laboratory Tests      1/7/19 04:59            1/9/19 12:16            Laboratory Tests            Test       1/7/19      04:59             Magnesium Level       1.63 mg/dL      (1.60-2.30)            PAST, FAMILY   Past Medical History      Past Medical History:  Arthritis, Diabetes Type 2, Heart Attack (2018),     Hypertension, Short of Air      Hematology/Oncology (M):  Prostate Cancer            Past Surgical History      Coronary Stent            Family History      UNKNOWN            Social History      Marital Status:        Lives independently:  Yes      Occupation:  retired- drove truck            Tobacco Use      Tobacco status:  Former smoker      Smoking packs/day:  1      Smoking history:  10-25 pack years      Quit status:  Quit date established (1974)            Alcohol Use      Alcohol intake:  None             Substance Use      Substance use:  Denies use            REVIEW OF SYSTEMS      General:  Admits: Fatigue;          Denies: Appetite Change, Fever, Night Sweats, Weight Gain, Weight Loss      Eye:  Denies: Blurred Vision, Corrective Lenses, Diplopia, Eye Irritation, Eye     Pain, Eye Redness, Spots in Vision, Vision Loss      ENT:  Denies: Headache, Hearing Loss, Hoarseness, Seizures, Sinus Congestion,     Sore Throat      Cardiovascular:  Denies: Chest Pain, Edema Ankles, Edema Legs, Irregular     Heartbeat, Palpitations      Respiratory:  Denies: Coughing Blood, Productive Cough, Shortness of Air,     Wheezing      Gastrointestinal:  Denies: Bloody Stools, Constipation, Diarrhea, Frequent     Heartburn, Nausea, Problem Swallowing, Tarry Stools, Unable to Control Bowels,     Vomiting      Genitourinary (male):  Denies: Blood in Urine, Decrease Urine Stream, Frequent     Urination, Incontinence, Painful Urination      Musculoskeletal:  Denies: Back Pain, Leg Cramps, Muscle Pain, Muscle Weakness,     Painful Joints, Swollen Joints      Integumentary:  Denies: Bleeds Easily, Bruises Easily, Hair Changes, Jaundice,     Lesions, Mole Changes, Nail Changes, Pigment Changes, Rash, Skin Discoloration      Neurologic:  Denies: Dizziness, Fainting, Numbness\Tingling, Paralysis, Seizures      Psychiatric:  Denies: Anxiety, Confused, Depression, Disoriented, Memory Loss      Endocrine:  Denies: Cold Intolerance, Diabetes, Excessive Sweating, Excessive     Thirst, Excessive Urination, Heat Intolerance, Flushing, Hyperthyroidism,     Hypothyroidism      Hematologic/Lymphatic:  Denies: Bruising, Bleeding, Enlarged Lymph Nodes,     Recurrent Infections, Transfusions            VITAL SIGNS AND SCORES      Vitals      Height 6 ft  / 182.88 cm      Weight 226 lbs 13.653 oz / 102.9 kg      BSA 2.25 m2      BMI 30.8 kg/m2      Temperature 97.9 F / 36.61 C - Temporal      Pulse 83      Respirations 18      Blood Pressure 119/59  Sitting, Right Arm      Pulse Oximetry 96%, rm air            Pain Score      Experiencing any pain?:  No      Pain Scale Used:  Numerical            Fatigue Score      Experiencing any fatigue?:  Yes      Fatigue (0-10 scale):  10            EXAM      General Appearance:  Positive for: Alert, Oriented x3, Mild Distress      Eye:  Positive for: Anicteric Sclerae      Neck:  Positive for: Masses      Respiratory:  Positive for: CTAB      Abdomen/Gastro:  Positive for: Soft;          Negative for: Hepatosplenomegaly      Skin:  Negative for: Induration, Lesions      Lower Extremities:  Negative for: Edema      Lymphatic:  Positive for: Cervical            PREVENTION      Hx Influenza Vaccination:  No (REFUSES)      Influenza Vaccine Declined:  Yes      2 or More Falls Past Year?:  No      Fall Past Year with Injury?:  No      Hx Pneumococcal Vaccination:  No (REFUSES)      Encouraged to follow-up with:  PCP regarding preventative exams.      Chart initiated by      LETHA ONEIL CMA            ALLERGY/MEDS      Allergies      Coded Allergies:             NO KNOWN DRUG ALLERGIES (Verified  Allergy, Unknown, 1/11/19)            Medications      Last Reconciled on 6/4/18 15:00 by ANNIKA CAMPBELL [facility]      Hydrocodone/Acetaminophen 5/325 MG (Hydrocodone/Acetaminophen 5/325 MG) 1 Each     Tablet      1-2 TAB PO Q4H for Post Surgical PAin, #10 TAB 0 Refills         Prov: Rmo Mendoza         1/9/19       Amoxicillin/Clavulanate K (Augmentin 875/125 Mg) 1 Each Tablet      875 MG PO BID for 10 Days, #20 TAB 0 Refills         Prov: Catracho Barboza         1/7/19       Tramadol (Tramadol) 50 Mg Tablet       MG PO Q6H PRN for MODERATE PAIN (PAIN LEVEL 4-6), #30 TAB 0 Refills         Prov: Catracho Barboza         1/7/19       Ergocalciferol (Vitamin D2*) 50,000 Unit Capsule      35794 UNITS PO Sa, #8 CAP 0 Refills         Reported         1/5/19       Cyanocobalamin (Vitamin B-12*) 1,000 Mcg Tablet      1000 MCG PO HS, #30 TAB 0  Refills         Reported         1/5/19       Clopidogrel Bisulfate (Plavix) 75 Mg Tablet      75 MG PO QDAY, TAB         Reported         1/3/19       Sotalol HCl (Sotalol HCl) 80 Mg Tablet      80 MG PO BID, TAB         Reported         1/3/19       Pantoprazole (Protonix*) 40 Mg Tablet.dr      40 MG PO QDAY@07, #30 TAB 0 Refills         Reported         1/3/19       glipiZIDE XL (glipiZIDE XL*) 10 Mg Tab.er.24      10 MG PO BID, #30 TAB.ER 0 Refills         Reported         8/4/18       Calcium Carb/Vit D3 (CALCIUM 600-VIT D3 400 TABLET) 1 Each Tablet      600 MG PO HS, TAB 0 Refills         Reported         12/7/16       Insulin Glargine (Lantus VIAL) 100 Units/Ml Vial      40 UNITS SUBQ HS, #1 VIAL 0 Refills         Reported         11/13/15       Amlodipine/Benazepril Hcl (Amlodipine/Benazepril 10/40 Mg) 1 Cap Cap      1 CAP PO QDAY         Reported         12/1/13       Aspirin EC (Aspirin EC) 81 Mg Tabec      81 MG PO QDAY         Reported         10/30/12      Medications Reviewed:  No Changes made to meds            IMPRESSION/PLAN      Impression      1) Metastatic Malignancy: Mr. Milian is a 78-year-old gentleman with a new     diagnosis of metastatic malignancy.  I had an extensive discussion today with     him regarding this.  In addition, I reviewed the case with the reviewing     pathologist.  Once again, this malignancy is extremely poorly differentiated and    a workup is ongoing to identify an etiology.  It does not appear to be recurrent    prostate cancer at this time.  It is felt to be potentially most consistent with    a sarcoma.  We will proceed with testing for targetable mutations including PD-    L1 status.  I did discuss with the patient today that, given how aggressive this    malignancy appears, it is likely to have an extremely poor prognosis at this     time.  However, if a targetable mutation is found, this may affect prognosis.      Mr. Milian and his family indicate understanding.   We will also proceed with a     PET scan and MRI for staging purposes as well as to evaluate his neuropathic     symptoms in the head neck area.            2) Pancytopenia: I did review the case with pathology.  The bone marrow biopsy     unfortunately was nondiagnostic.  We may need to consider repeat bone marrow     biopsy.  Otherwise, we will continue aggressive supportive measures from a     transfusional perspective.  It is likely that his pancytopenia is due to bone     marrow infiltration with the underlying malignancy.            3) Bone Metastases: We will continue to follow this closely and provide support     as necessary for this.            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         Lymph node location: neck            Bone metastases - C79.51            Notes      New Diagnostics      * CBC, Routine         Dx: Pancytopenia - D61.818      * CMP Comp Metabolic Panel, Routine         Dx: Pancytopenia - D61.818      * LDH, Routine         Dx: Pancytopenia - D61.818      * CBC, 01/14/19         Dx: Pancytopenia - D61.818      * BMP, 01/14/19         Dx: Pancytopenia - D61.818      * Type Screen Crossmatch, 01/14/19         Dx: Pancytopenia - D61.818            Plan      1) CARIS testing with PD-L1 ASAP (call [facility] to get this done this way)            2) MRI brain and PET 1/14/19            3) CBC/BMP/type and cross 1/14/19            4) RTC 1 week to potentially start Keytruda contingent upon PD-L1 status            The total time of the visit was 45 minutes.  Over 50% that time was spent in     face-to-face counseling regarding the findings on imaging studies, differential     diagnosis, prognosis, and coordination of care.            Patient Education      Patient Education Provided:  Yes                 Disclaimer: Converted document may not contain table formatting or lab diagrams. Please  see [employer] for the authenticated document.

## 2022-11-08 NOTE — CONSULTS
Date of Hospital Visit: [unfilled]ENC@  Encounter Provider: Shan Castano MD  Place of Service: [facility] CARDIOLOGY  Patient Name: Ruben Milian  :1940  Referral Provider: Albert Ruiz MD    Chief complaint: Bradycardia    History of Present Illness    Mr. Milian is a 78 year old medically complex gentlemen who presents as transfer from [place] for evaluation of possible coronary artery disease and possible CABG.  He has multiple medical issues on going which have postponed intervention including anemia and proteinuria.  I have been asked to see him due to his history of paroxsymal atrial fibrillation and recent episode of symptomatic bradycardia for which he received a temporary pacemaker.  This was approximately one week ago.  His bradycardia resolved and he was discharged home with returned with atrial fibrillation with rapid ventricular response and NSTEMI.  Cath at [place] revealed significant coronary artery disease and he was then transferred.  The gentlemen complains of significant functional limitation with NYHA class III symptoms.  It is currently unknown what his ejection fraction is.       Past Medical History:   Diagnosis Date   • Cancer (CMS/[facility])     prostate   • Diabetes (CMS/[facility])    • H/O angioplasty    • Peptic ulcer        Past Surgical History:   Procedure Laterality Date   • AMPUTATION FINGER / THUMB     • APPENDECTOMY         Prescriptions Prior to Admission   Medication Sig Dispense Refill Last Dose   • amLODIPine-benazepril (LOTREL) 10-40 MG per capsule Take 1 capsule by mouth Daily.   2018 at Unknown time   • aspirin 81 MG EC tablet Take 81 mg by mouth Daily.   2018 at Unknown time   • atorvastatin (LIPITOR) 20 MG tablet Take 20 mg by mouth Every Night.   2018 at Unknown time   • calcium carbonate (OS-BLAISE) 600 MG tablet Take 600 mg by mouth 2 (Two) Times a Day With Meals.   2018 at Unknown time   • cholecalciferol (VITAMIN D3) 1000  Patient understands condition, prognosis and need for follow up care. units tablet Take 1,000 Units by mouth Daily.   8/7/2018 at Unknown time   • glipiZIDE (GLUCOTROL) 10 MG tablet Take 10 mg by mouth 2 (Two) Times a Day.   8/8/2018 at Unknown time   • insulin glargine (LANTUS) 100 UNIT/ML injection Inject 40 Units under the skin into the appropriate area as directed Every Night.   8/7/2018 at Unknown time   • metoprolol succinate XL (TOPROL-XL) 25 MG 24 hr tablet Take 12.5 mg by mouth Every Night.   8/8/2018 at Unknown time   • spironolactone (ALDACTONE) 25 MG tablet Take 25 mg by mouth Every Other Day.   8/8/2018 at Unknown time       Current Meds  Scheduled Meds:  aspirin 81 mg Oral Daily   atorvastatin 20 mg Oral Nightly   calcium carbonate 5 tablet Oral Daily   cholecalciferol 1,000 Units Oral Daily   insulin aspart 0-9 Units Subcutaneous 4x Daily With Meals & Nightly   insulin detemir 40 Units Subcutaneous Nightly   lisinopril 40 mg Oral Q24H   metoprolol succinate XL 12.5 mg Oral Nightly   [START ON 8/10/2018] pantoprazole 40 mg Oral Q AM   sodium chloride 10 mL Intravenous Q12H     Continuous Infusions:   PRN Meds:.•  acetaminophen  •  ALPRAZolam  •  dextrose  •  dextrose  •  diphenhydrAMINE  •  glucagon (human recombinant)  •  heparin flush (porcine)  •  magnesium sulfate **OR** magnesium sulfate in D5W 1g/100mL (PREMIX)  •  nitroglycerin  •  potassium chloride **OR** potassium chloride **OR** potassium chloride  •  sodium chloride  •  sodium chloride  •  sodium chloride  •  temazepam    Allergies as of 08/08/2018   • (No Known Allergies)       Social History     Social History   • Marital status:      Spouse name: N/A   • Number of children: N/A   • Years of education: N/A     Occupational History   • Not on file.     Social History Main Topics   • Smoking status: Former Smoker     Packs/day: 1.00     Types: Cigarettes     Start date: 1/1/1963     Quit date: 1/1/1975   • Smokeless tobacco: Never Used   • Alcohol use No   • Drug use: No   • Sexual activity: Defer  "    Other Topics Concern   • Not on file     Social History Narrative   • No narrative on file       Family History   Problem Relation Age of Onset   • Heart disease Mother    • Heart disease Sister        REVIEW OF SYSTEMS:   12 point ROS was performed and is negative except as outlined in HPI     REVIEW OF SYSTEMS:   CONSTITUTIONAL: + for fatigue and weakness.   HEENT: Eyes: No visual loss, blurred vision, double vision or yellow sclerae. Ears, Nose, Throat: No hearing loss, sneezing, congestion, runny nose or sore throat.   SKIN: No rash or itching.     RESPIRATORY: + shortness of breath, hemoptysis, cough or sputum.   GASTROINTESTINAL: No anorexia, nausea, vomiting or diarrhea. No abdominal pain, bright red blood per rectum or melena.  GENITOURINARY: No burning on urination, hematuria or increased frequency.  NEUROLOGICAL: No headache, dizziness, syncope, paralysis, ataxia, numbness or tingling in the extremities. No change in bowel or bladder control.   MUSCULOSKELETAL: No muscle, back pain, joint pain or stiffness.   HEMATOLOGIC: No anemia, bleeding or bruising.   LYMPHATICS: No enlarged nodes. No history of splenectomy.   PSYCHIATRIC: No history of depression, anxiety, hallucinations.   ENDOCRINOLOGIC: No reports of sweating, cold or heat intolerance. No polyuria or polydipsia.        Objective:   Temp:  [98.1 °F (36.7 °C)-99.2 °F (37.3 °C)] 98.1 °F (36.7 °C)  Heart Rate:  [73-91] 84  Resp:  [18-20] 18  BP: (120-168)/(68-87) 147/87  Body mass index is 30.52 kg/m².  Flowsheet Rows      First Filed Value   Admission Height  182.9 cm (72\") Documented at 08/08/2018 2013   Admission Weight  102 kg (225 lb) Documented at 08/09/2018 0631        Vitals:    08/09/18 1542   BP: 147/87   Pulse: 84   Resp: 18   Temp: 98.1 °F (36.7 °C)   SpO2: 96%       General Appearance:    Alert, cooperative, in no acute distress   Head:    Normocephalic, without obvious abnormality, atraumatic   Eyes:            Lids and lashes " normal, conjunctivae and sclerae normal, no   icterus, no pallor, corneas clear, PERRLA   Ears:    Ears appear intact with no abnormalities noted   Throat:   No oral lesions, no thrush, oral mucosa moist   Neck:   No adenopathy, supple, trachea midline, no thyromegaly, no   carotid bruit, no JVD   Back:     No kyphosis present, no scoliosis present, no skin lesions, erythema or scars, no tenderness to percussion or palpation, range of motion normal   Lungs:     Clear to auscultation,respirations regular, even and unlabored    Heart:    Regular rhythm and normal rate, normal S1 and S2.   Chest Wall:    No abnormalities observed   Abdomen:     Normal bowel sounds, no masses, no organomegaly, soft        non-tender, non-distended, no guarding, no rebound  tenderness   Extremities:   Moves all extremities well, mild  Edema of both legs, no cyanosis, no redness   Pulses:  radial Pulses palpable and equal bilaterally.    Skin:  Psychiatric:   Very pale    Alert and oriented x 3, normal mood and affect                 Lab Review:      Results from last 7 days  Lab Units 08/09/18  0545   SODIUM mmol/L 141   POTASSIUM mmol/L 4.6   CHLORIDE mmol/L 103   CO2 mmol/L 28.1   BUN mg/dL 22   CREATININE mg/dL 1.20   CALCIUM mg/dL 8.5*   GLUCOSE mg/dL 159*       Results from last 7 days  Lab Units 08/08/18  2303   TROPONIN T ng/mL 0.245*     @LABRCNTbnp@    Results from last 7 days  Lab Units 08/09/18  0545   WBC 10*3/mm3 2.36*   HEMOGLOBIN g/dL 7.8*   HEMATOCRIT % 25.4*   PLATELETS 10*3/mm3 114*           Results from last 7 days  Lab Units 08/08/18  2303   MAGNESIUM mg/dL 2.0     @LABRCNTIP(chol,trig,hdl,ldl)    I personally viewed and interpreted the patient's EKG/Telemetry data  )  Patient Active Problem List   Diagnosis   • Coronary artery disease involving native coronary artery of native heart with unstable angina pectoris (CMS/[facility])     Assessment and Plan:    Tachy-Michael Syndrome  Paroxysmal Atrial Fibrillation  History of  Severe Symptomatic Bradycardia    I think pacemaker placement for control of his tachy-denis syndrome is reasonable.  I would like to see the actual strips from his visit in [place] and will try to obtain.  I don't think it would be reasonable to place an ICD given his ongoing cancer and recent MI, but would like to know ejection fraction prior.  Will continue to follow.     Shan Castano MD  08/09/18  5:58 PM.

## (undated) DEVICE — BND PRESS RADL COMFRT 14IN STRL

## (undated) DEVICE — SOL IRR NACL 0.9PCT BT 1000ML

## (undated) DEVICE — GW EMR FIX EXCHG J STD .035 3MM 260CM

## (undated) DEVICE — Device

## (undated) DEVICE — INTRO SHEATH PRELUDE SNAP .038 8.5F 13CM W/SDPRT LT/BLU

## (undated) DEVICE — LIMB HOLDER, WRIST/ANKLE: Brand: DEROYAL

## (undated) DEVICE — CATH DIAG IMPULSE FL4 5F 100CM

## (undated) DEVICE — PK CATH CARD 40

## (undated) DEVICE — TREK CORONARY DILATATION CATHETER 3.0 MM X 20 MM / RAPID-EXCHANGE: Brand: TREK

## (undated) DEVICE — NC TREK CORONARY DILATATION CATHETER 3.0 MM X 20 MM / RAPID-EXCHANGE: Brand: NC TREK

## (undated) DEVICE — BALN CORNRY SINUS 6F 1X80CM

## (undated) DEVICE — INTRO TEAR AWAY/LVD W/SD PRT 10F 13CM

## (undated) DEVICE — GLIDESHEATH BASIC HYDROPHILIC COATED INTRODUCER SHEATH: Brand: GLIDESHEATH

## (undated) DEVICE — Device: Brand: WEBSTER

## (undated) DEVICE — LOU PACE DEFIB: Brand: MEDLINE INDUSTRIES, INC.

## (undated) DEVICE — GW HITORQUE/BAL MID/WT J W/HCOAT .014 3X190CM

## (undated) DEVICE — DEV INDEFLATOR

## (undated) DEVICE — KT MANIFLD CARDIAC

## (undated) DEVICE — ELECTRD ECG CARBON/SNP RL FM A/ 5PK

## (undated) DEVICE — GUIDE CATHETER: Brand: MACH1™

## (undated) DEVICE — AIRLIFE™ NASAL OXYGEN CANNULA CURVED, NONFLARED TIP WITH 21 FOOT (6.4 M) CRUSH-RESISTANT TUBING, OVER-THE-EAR STYLE: Brand: AIRLIFE™